# Patient Record
Sex: FEMALE | Race: WHITE | NOT HISPANIC OR LATINO | Employment: FULL TIME | URBAN - METROPOLITAN AREA
[De-identification: names, ages, dates, MRNs, and addresses within clinical notes are randomized per-mention and may not be internally consistent; named-entity substitution may affect disease eponyms.]

---

## 2017-01-07 ENCOUNTER — OFFICE VISIT (OUTPATIENT)
Dept: URGENT CARE | Facility: URGENT CARE | Age: 57
End: 2017-01-07
Payer: COMMERCIAL

## 2017-01-07 VITALS
WEIGHT: 167.6 LBS | TEMPERATURE: 101.6 F | SYSTOLIC BLOOD PRESSURE: 123 MMHG | BODY MASS INDEX: 24.74 KG/M2 | HEART RATE: 99 BPM | OXYGEN SATURATION: 96 % | DIASTOLIC BLOOD PRESSURE: 77 MMHG

## 2017-01-07 DIAGNOSIS — R07.0 THROAT PAIN: ICD-10-CM

## 2017-01-07 DIAGNOSIS — R68.89 FLU-LIKE SYMPTOMS: Primary | ICD-10-CM

## 2017-01-07 LAB
DEPRECATED S PYO AG THROAT QL EIA: NORMAL
MICRO REPORT STATUS: NORMAL
SPECIMEN SOURCE: NORMAL

## 2017-01-07 PROCEDURE — 87081 CULTURE SCREEN ONLY: CPT | Performed by: FAMILY MEDICINE

## 2017-01-07 PROCEDURE — 87880 STREP A ASSAY W/OPTIC: CPT | Performed by: FAMILY MEDICINE

## 2017-01-07 PROCEDURE — 99213 OFFICE O/P EST LOW 20 MIN: CPT | Performed by: FAMILY MEDICINE

## 2017-01-07 NOTE — NURSING NOTE
"Chief Complaint   Patient presents with     URI     started Monday with a cold       Initial /77 mmHg  Pulse 99  Temp(Src) 101.6  F (38.7  C) (Oral)  Wt 167 lb 9.6 oz (76.023 kg)  SpO2 96% Estimated body mass index is 24.74 kg/(m^2) as calculated from the following:    Height as of 11/4/16: 5' 9\" (1.753 m).    Weight as of this encounter: 167 lb 9.6 oz (76.023 kg).  BP completed using cuff size: stan Jorge MA        "

## 2017-01-07 NOTE — PROGRESS NOTES
Madison Garcia with presents with 4-5 days symptoms including sore throat, congestion, non productive cough, high fever.  symptoms seem to worsen over the past da with the fever and the st worsening.     The patient has a history of mitral valve replacement.     Treatment measures tried include OTC meds.    Exposure to  and son with more mild cold illnesses  No  recent travel     Current Outpatient Prescriptions   Medication Sig Dispense Refill     buPROPion (WELLBUTRIN XL) 150 MG 24 hr tablet Take 3 tablets (450 mg) by mouth daily 270 tablet 1     simvastatin (ZOCOR) 40 MG tablet Take 1 tablet (40 mg) by mouth At Bedtime 90 tablet 3     melatonin 3 MG CAPS Take 6 mg by mouth daily       estradiol (VAGIFEM) 10 MCG TABS Place 1 tablet (10 mcg) vaginally twice a week 8 tablet      ALPRAZolam (XANAX) 1 MG tablet Take 1 tablet (1 mg) by mouth nightly as needed for anxiety 90 tablet 0     Ibuprofen-Diphenhydramine Cit (IBUPROFEN PM PO)        diphenhydrAMINE (BENADRYL) 25 MG capsule Take 25 mg by mouth Take 2 tablets at night prn for sleep       ibuprofen (ADVIL) 200 MG tablet Take 400 mg by mouth every 4 hours as needed       acetaminophen (TYLENOL) 325 MG tablet Take 325-650 mg by mouth every 6 hours as needed.       aspirin 81 MG tablet Take 1 tablet by mouth daily. 1 tablet 3     Multiple Vitamin (DAILY MULTIVITAMIN PO) Take 1 tablet by mouth daily.         ROS otherwise negative for resp., ID,  HEENT symptoms.    Objective: /77 mmHg  Pulse 99  Temp(Src) 101.6  F (38.7  C) (Oral)  Wt 167 lb 9.6 oz (76.023 kg)  SpO2 96%  Exam:  GENERAL APPEARANCE: healthy, alert and no distress  EYES: Eyes grossly normal to inspection  HENT: ear canals and TM's normal and nose and mouth without ulcers or lesions  NECK: no adenopathy, no asymmetry, masses, or scars and thyroid normal to palpation  RESP: lungs clear to auscultation - no rales, rhonchi or wheezes  CV: regular rates and rhythm, normal S1 S2, no S3 or S4  and no murmur, click or rub -      Rapid strep test is negative.       ICD-10-CM    1. Flu-like symptoms R68.89    2. Throat pain R07.0 Strep, Rapid Screen     Beta strep group A culture     Symptomatic therapy and follow up discussed

## 2017-01-07 NOTE — Clinical Note
[address]  [address]  [phone]        January 7, 2017      RE: Madison Garcia  [address] [address] [place] [id number]       To whom it may concern:    Madison Garcia was seen in our clinic today. She may return to work on or about January 10, 2017 .      Sincerely,      Miguel Whaley MD

## 2017-01-09 LAB
BACTERIA SPEC CULT: NORMAL
MICRO REPORT STATUS: NORMAL
SPECIMEN SOURCE: NORMAL

## 2017-04-03 ENCOUNTER — TRANSFERRED RECORDS (OUTPATIENT)
Dept: HEALTH INFORMATION MANAGEMENT | Facility: CLINIC | Age: 57
End: 2017-04-03

## 2017-05-17 DIAGNOSIS — F41.9 ANXIETY: ICD-10-CM

## 2017-05-18 RX ORDER — ALPRAZOLAM 1 MG
TABLET ORAL
Qty: 90 TABLET | Refills: 0 | Status: SHIPPED | OUTPATIENT
Start: 2017-05-18 | End: 2017-08-16

## 2017-05-18 NOTE — TELEPHONE ENCOUNTER
ALPRAZolam (XANAX) 1 MG tablet        Last Written Prescription Date: 11/4/2016  Last Fill Quantity: 90,  # refills: 0   Last Office Visit with [facility], [facility] or [facility] prescribing provider: 11/4/2016

## 2017-05-22 DIAGNOSIS — F41.9 ANXIETY: ICD-10-CM

## 2017-05-22 RX ORDER — ALPRAZOLAM 1 MG
TABLET ORAL
Qty: 90 TABLET | OUTPATIENT
Start: 2017-05-22

## 2017-08-16 DIAGNOSIS — F32.A ANXIETY AND DEPRESSION: ICD-10-CM

## 2017-08-16 DIAGNOSIS — F41.9 ANXIETY AND DEPRESSION: ICD-10-CM

## 2017-08-16 RX ORDER — ALPRAZOLAM 1 MG
TABLET ORAL
Qty: 90 TABLET | Refills: 0 | Status: SHIPPED | OUTPATIENT
Start: 2017-08-16 | End: 2017-11-06

## 2017-08-16 RX ORDER — BUPROPION HYDROCHLORIDE 150 MG/1
TABLET ORAL
Qty: 270 TABLET | Refills: 0 | Status: SHIPPED | OUTPATIENT
Start: 2017-08-16 | End: 2017-11-06

## 2017-08-16 RX ORDER — BUPROPION HYDROCHLORIDE 300 MG/1
TABLET ORAL
Qty: 90 TABLET | Refills: 0 | Status: SHIPPED | OUTPATIENT
Start: 2017-08-16 | End: 2017-11-06

## 2017-10-18 ENCOUNTER — HOSPITAL ENCOUNTER (OUTPATIENT)
Dept: CARDIOLOGY | Facility: CLINIC | Age: 57
Discharge: HOME | End: 2017-10-18
Attending: INTERNAL MEDICINE | Admitting: INTERNAL MEDICINE
Payer: COMMERCIAL

## 2017-10-18 DIAGNOSIS — I05.9 MITRAL VALVE DISEASE: ICD-10-CM

## 2017-10-18 PROCEDURE — 93306 TTE W/DOPPLER COMPLETE: CPT

## 2017-10-18 PROCEDURE — 93306 TTE W/DOPPLER COMPLETE: CPT | Mod: 26 | Performed by: INTERNAL MEDICINE

## 2017-10-24 ENCOUNTER — OFFICE VISIT (OUTPATIENT)
Dept: CARDIOLOGY | Facility: CLINIC | Age: 57
End: 2017-10-24
Attending: INTERNAL MEDICINE
Payer: COMMERCIAL

## 2017-10-24 VITALS
HEART RATE: 76 BPM | DIASTOLIC BLOOD PRESSURE: 70 MMHG | HEIGHT: 69 IN | BODY MASS INDEX: 25.92 KG/M2 | WEIGHT: 175 LBS | SYSTOLIC BLOOD PRESSURE: 117 MMHG

## 2017-10-24 DIAGNOSIS — I25.83 CORONARY ARTERY DISEASE DUE TO LIPID RICH PLAQUE: Primary | ICD-10-CM

## 2017-10-24 DIAGNOSIS — I05.9 MITRAL VALVE DISEASE: ICD-10-CM

## 2017-10-24 DIAGNOSIS — R06.09 DOE (DYSPNEA ON EXERTION): ICD-10-CM

## 2017-10-24 DIAGNOSIS — I25.10 CORONARY ARTERY DISEASE DUE TO LIPID RICH PLAQUE: Primary | ICD-10-CM

## 2017-10-24 PROCEDURE — 99214 OFFICE O/P EST MOD 30 MIN: CPT | Performed by: INTERNAL MEDICINE

## 2017-10-24 RX ORDER — ROSUVASTATIN CALCIUM 20 MG/1
20 TABLET, COATED ORAL DAILY
Qty: 30 TABLET | Refills: 11 | Status: SHIPPED | OUTPATIENT
Start: 2017-10-24 | End: 2018-01-23

## 2017-10-24 RX ORDER — ESTRADIOL 0.1 MG/G
2 CREAM VAGINAL
COMMUNITY
End: 2018-11-08

## 2017-10-24 RX ORDER — CARVEDILOL 3.12 MG/1
3.12 TABLET ORAL SEE ADMIN INSTRUCTIONS
Qty: 60 TABLET | Refills: 11 | Status: SHIPPED | OUTPATIENT
Start: 2017-10-24 | End: 2018-11-08

## 2017-10-24 NOTE — PROGRESS NOTES
Cardiology Progress Note          Assessment and Plan:     1. Mitral valve repair with repair-induced mitral stenosis, mild-moderate    Trial of carvedilol 3.125 mg daily to help with her exertional dyspnea.  May go up to 6.25 mg or 9.375 mg.    Echocardiogram appears stable.      2. Mild coronary artery disease seen on angiogram 2013    Suboptimal LDL control    Change simvastatin 40 mg to rosuvastatin 20 mg and let us know if she gets myalgias.    She may refill her medications with her primary physician.  Follow-up in around 3-5 years with echocardiogram, sooner if symptoms arise.    This note was transcribed using electronic voice recognition software and there may be typographical errors present.                Interval History:   The patient is a very pleasant 57 year old whom I have been following since minimally invasive mitral valve repair at [facility] 2013.  She has some repair-induced mitral stenosis that has been relatively stable with mean gradient 6-7 mmHg.  Her gradient goes up higher than 10 post exercise.  She feels that her exercise tolerance is diminished but stable.  She did have some mild coronary artery disease on her angiogram at the time of her surgery.  She is on simvastatin 40 mg with .  She had myalgias on atorvastatin.  She has never tried rosuvastatin.  She was previously on atenolol and metoprolol to try to plug her exercise-induced heart rate and help her dyspnea.  She had some lightheadedness with those medications and possible mood effects of the metoprolol.                       Review of Systems:   Review of Systems:  Skin:  Negative     Eyes:  Positive for glasses  ENT:  Negative    Respiratory:  Positive for dyspnea on exertion  Cardiovascular:    Positive for;dizziness  Gastroenterology: Negative    Genitourinary:  not assessed    Musculoskeletal:  Negative    Neurologic:  Negative    Psychiatric:  Positive for depression;anxiety  Heme/Lymph/Imm:  Negative    Endocrine:  " Negative                Physical Exam:     Vitals: /70  Pulse 76  Ht 1.753 m (5' 9\")  Wt 79.4 kg (175 lb)  BMI 25.84 kg/m2  Constitutional:  cooperative, alert and oriented, well developed, well nourished, in no acute distress        Skin:  warm and dry to the touch, no apparent skin lesions or masses noted surgical scars well-healed      Head:  normocephalic, no masses or lesions        Eyes:       eyeglasses    ENT:  no pallor or cyanosis, dentition good        Neck:  JVP normal        Chest:  normal breath sounds, clear to auscultation, normal A-P diameter, normal symmetry, normal respiratory excursion, no use of accessory muscles        Cardiac: regular rhythm;normal S1 and S2       systolic murmur;grade 1          Abdomen:      benign    Extremities and Back:  no deformities, clubbing, cyanosis, erythema observed        Neurological:  affect appropriate, oriented to time, person and place;no gross motor deficits                 Medications:     Current Outpatient Prescriptions   Medication Sig Dispense Refill     estradiol (ESTRACE) 0.1 MG/GM cream Place 2 g vaginally twice a week       rosuvastatin (CRESTOR) 20 MG tablet Take 1 tablet (20 mg) by mouth daily 30 tablet 11     carvedilol (COREG) 3.125 MG tablet Take 1 tablet (3.125 mg) by mouth See Admin Instructions 60 tablet 11     buPROPion (WELLBUTRIN XL) 300 MG 24 hr tablet TAKE 1 TABLET BY MOUTH EVERY DAY ALONG WITH THE 150MG TABLET 90 tablet 0     ALPRAZolam (XANAX) 1 MG tablet TAKE 1 TAB AT NIGHT AS NEEDED FOR ANXIETY 90 tablet 0     buPROPion (WELLBUTRIN XL) 150 MG 24 hr tablet Take 1 of the 150mg along with one of the 300mg tablets for total daily dose of 450mg 270 tablet 0     melatonin 3 MG CAPS Take 6 mg by mouth daily       Ibuprofen-Diphenhydramine Cit (IBUPROFEN PM PO)        diphenhydrAMINE (BENADRYL) 25 MG capsule Take 25 mg by mouth Take 2 tablets at night prn for sleep       ibuprofen (ADVIL) 200 MG tablet Take 400 mg by mouth every " 4 hours as needed       acetaminophen (TYLENOL) 325 MG tablet Take 325-650 mg by mouth every 6 hours as needed.       aspirin 81 MG tablet Take 1 tablet by mouth daily. 1 tablet 3     Multiple Vitamin (DAILY MULTIVITAMIN PO) Take 1 tablet by mouth daily.                  Data:   All laboratory data reviewed  No results found for this or any previous visit (from the past 24 hour(s)).    All laboratory data reviewed  Lab Results   Component Value Date    CHOL 193 11/04/2016     Lab Results   Component Value Date    HDL 57 11/04/2016     Lab Results   Component Value Date     11/04/2016     Lab Results   Component Value Date    TRIG 123 11/04/2016     Lab Results   Component Value Date    CHOLHDLRATIO 3.4 11/02/2015     TSH   Date Value Ref Range Status   10/22/2013 4.89 0.4 - 5.0 mU/L Final     Last Basic Metabolic Panel:  Lab Results   Component Value Date     11/04/2016      Lab Results   Component Value Date    POTASSIUM 4.0 11/04/2016     Lab Results   Component Value Date    CHLORIDE 104 11/04/2016     Lab Results   Component Value Date    JANIS 9.4 11/04/2016     Lab Results   Component Value Date    CO2 30 11/04/2016     Lab Results   Component Value Date    BUN 14 11/04/2016     Lab Results   Component Value Date    CR 0.88 11/04/2016     Lab Results   Component Value Date    GLC 88 11/04/2016     Lab Results   Component Value Date    WBC 4.2 11/04/2016     Lab Results   Component Value Date    RBC 4.56 11/04/2016     Lab Results   Component Value Date    HGB 13.4 11/04/2016     Lab Results   Component Value Date    HCT 40.3 11/04/2016     Lab Results   Component Value Date    MCV 88 11/04/2016     Lab Results   Component Value Date    MCH 29.4 11/04/2016     Lab Results   Component Value Date    MCHC 33.3 11/04/2016     Lab Results   Component Value Date    RDW 12.4 11/04/2016     Lab Results   Component Value Date     11/04/2016

## 2017-10-24 NOTE — MR AVS SNAPSHOT
After Visit Summary   10/24/2017    Madison Garcia    MRN: [id number]           Patient Information     Date Of Birth          1960        Visit Information        Provider Department      10/24/2017 8:45 AM Casey Roberts MD [facility]        Today's Diagnoses     Coronary artery disease due to lipid rich plaque    -  1    Mitral valve disease        [place] (dyspnea on exertion)          Care Instructions    Please try CRESTOR 20 mg daily, that will be more potent than your SIMVASTATIN 40. If you get muscle weakness or aches, you may cut it in half or stop it. Please let us know if you do.    If you notice worsening shortness of breath, especially if a distinct and abrupt change, please let us know ASAP.    Try the CARVEDILOL 3.125mg once per day in the morning or mid-day. It lasts about 12 hours. If you would like, you may increase it to 6.25mg (two pills) or 9.375mg (three pills) depending on how you feel on it. It'll be a trade off of breathing and lightheadedness.    See you in ~5 years with an echo.             Follow-ups after your visit        Your next 10 appointments already scheduled     Nov 06, 2017  8:00 AM CST   [facility]YS[facility] with [facility]ith [facility]   [facility]    [address] [address] [phone]              Who to contact     If you have questions or need follow up information about today's clinic visit or your schedule please contact [facility] directly at [phone].  Normal or non-critical lab and imaging results will be communicated to you by MyChart, letter or phone within 4 business days after the clinic has received the results. If you do not hear from us within 7 days, please contact the clinic through MyChart or phone. If you have a critical or abnormal lab result, we will notify you by phone as soon as possible.  Submit  "refill requests through [employer] or call your pharmacy and they will forward the refill request to us. Please allow 3 business days for your refill to be completed.          Additional Information About Your Visit        [employer] Information     [employer] gives you secure access to your electronic health record. If you see a primary care provider, you can also send messages to your care team and make appointments. If you have questions, please call your primary care clinic.  If you do not have a primary care provider, please call [phone] and they will assist you.        Care EveryWhere ID     This is your Care EveryWhere ID. This could be used by other organizations to access your [place] medical records  [id number]        Your Vitals Were     Pulse Height BMI (Body Mass Index)             76 1.753 m (5' 9\") 25.84 kg/m2          Blood Pressure from Last 3 Encounters:   10/24/17 117/70   01/07/17 123/77   11/04/16 100/70    Weight from Last 3 Encounters:   10/24/17 79.4 kg (175 lb)   01/07/17 76 kg (167 lb 9.6 oz)   11/04/16 76.5 kg (168 lb 11.2 oz)              We Performed the Following     Follow-Up with Cardiologist          Today's Medication Changes          These changes are accurate as of: 10/24/17  9:00 AM.  If you have any questions, ask your nurse or doctor.               Start taking these medicines.        Dose/Directions    carvedilol 3.125 MG tablet   Commonly known as:  COREG   Used for:  [place] (dyspnea on exertion)   Started by:  Casey Roberts MD        Dose:  3.125 mg   Take 1 tablet (3.125 mg) by mouth See Admin Instructions   Quantity:  60 tablet   Refills:  11       rosuvastatin 20 MG tablet   Commonly known as:  CRESTOR   Used for:  Coronary artery disease due to lipid rich plaque   Started by:  Casey Roberts MD        Dose:  20 mg   Take 1 tablet (20 mg) by mouth daily   Quantity:  30 tablet   Refills:  11         Stop taking these medicines if you haven't already. Please " contact your care team if you have questions.     simvastatin 40 MG tablet   Commonly known as:  ZOCOR   Stopped by:  Casey Roberts MD                Where to get your medicines      These medications were sent to [facility]/pharmacy #[id number] - [facility] [address] [place] AT [facility]  [address] [facility] [address]     Phone:  [phone]     carvedilol 3.125 MG tablet    rosuvastatin 20 MG tablet                Primary Care Provider Office Phone # Fax #    Tera Keith [phone] [address] [facility] [address]  [place] [address]        Equal Access to Services     [facility] : Hadii aleah chaves hadasho Soriana, waaxda luqadaha, qaybta kaalmada adetanneryada, lidia wu . So [facility] [phone].    ATENCIÓN: Si habla español, tiene a westfall disposición servicios gratuitos de asistencia lingüística. Llame al [phone].    We comply with applicable federal civil rights laws and [place] laws. We do not discriminate on the basis of race, color, national origin, age, disability, sex, sexual orientation, or gender identity.            Thank you!     Thank you for choosing [facility] [place]  for your care. Our goal is always to provide you with excellent care. Hearing back from our patients is one way we can continue to improve our services. Please take a few minutes to complete the written survey that you may receive in the mail after your visit with us. Thank you!             Your Updated Medication List - Protect others around you: Learn how to safely use, store and throw away your medicines at www.disposemymeds.org.          This list is accurate as of: 10/24/17  9:00 AM.  Always use your most recent med list.                   Brand Name Dispense Instructions for use Diagnosis    ADVIL 200 MG tablet   Generic drug:  ibuprofen      Take 400 mg by mouth every 4 hours as needed         ALPRAZolam 1 MG tablet    XANAX    90 tablet    TAKE 1 TAB AT NIGHT AS NEEDED FOR ANXIETY    Anxiety and depression       aspirin 81 MG tablet     1 tablet    Take 1 tablet by mouth daily.    ASCVD (arteriosclerotic cardiovascular disease)       BENADRYL 25 MG capsule   Generic drug:  diphenhydrAMINE      Take 25 mg by mouth Take 2 tablets at night prn for sleep        * buPROPion 300 MG 24 hr tablet    WELLBUTRIN XL    90 tablet    TAKE 1 TABLET BY MOUTH EVERY DAY ALONG WITH THE 150MG TABLET    Anxiety and depression       * buPROPion 150 MG 24 hr tablet    WELLBUTRIN XL    270 tablet    Take 1 of the 150mg along with one of the 300mg tablets for total daily dose of 450mg    Anxiety and depression       carvedilol 3.125 MG tablet    COREG    60 tablet    Take 1 tablet (3.125 mg) by mouth See Admin Instructions    [place] (dyspnea on exertion)       DAILY MULTIVITAMIN PO      Take 1 tablet by mouth daily.    Routine general medical examination at a health care facility, Anxiety and depression, MVP (mitral valve prolapse)       estradiol 0.1 MG/GM cream    ESTRACE     Place 2 g vaginally twice a week        IBUPROFEN PM PO           melatonin 3 MG Caps      Take 6 mg by mouth daily        rosuvastatin 20 MG tablet    CRESTOR    30 tablet    Take 1 tablet (20 mg) by mouth daily    Coronary artery disease due to lipid rich plaque       TYLENOL 325 MG tablet   Generic drug:  acetaminophen      Take 325-650 mg by mouth every 6 hours as needed.        * Notice:  This list has 2 medication(s) that are the same as other medications prescribed for you. Read the directions carefully, and ask your doctor or other care provider to review them with you.

## 2017-10-24 NOTE — PATIENT INSTRUCTIONS
Please try CRESTOR 20 mg daily, that will be more potent than your SIMVASTATIN 40. If you get muscle weakness or aches, you may cut it in half or stop it. Please let us know if you do.    If you notice worsening shortness of breath, especially if a distinct and abrupt change, please let us know ASAP.    Try the CARVEDILOL 3.125mg once per day in the morning or mid-day. It lasts about 12 hours. If you would like, you may increase it to 6.25mg (two pills) or 9.375mg (three pills) depending on how you feel on it. It'll be a trade off of breathing and lightheadedness.    See you in ~5 years with an echo.

## 2017-11-03 NOTE — PROGRESS NOTES
SUBJECTIVE:   CC: Madison Garcia is an 57 year old woman who presents for preventive health visit.     She has chronic insomnia for years, using xanax hs, no signs abuse, and otc, with that still some issues.  Did get seen at sleep clinic as noted in past.      She has dyspnea on exertion with stairs or hills for years, since mv repair, not new or changed, prior eval by both pulm and cards and now to try coreg via cards but valve stable    Has had prior lung nodules but follow up scans no change.    Her depression and anxiety are under fairly good control.   Works out some.  Working full time, 2 kids 9 and 14.    Physical   Annual:     Getting at least 3 servings of Calcium per day::  NO    Bi-annual eye exam::  Yes    Dental care twice a year::  Yes    Sleep apnea or symptoms of sleep apnea::  None    Diet::  Regular (no restrictions)    Frequency of exercise::  1 day/week    Taking medications regularly::  Yes    Medication side effects::  None    Additional concerns today::  No            Today's PHQ-2 Score:   PHQ-2 ( 1999 [employer]) 11/3/2017   Q1: Little interest or pleasure in doing things 1   Q2: Feeling down, depressed or hopeless 1   PHQ-2 Score 2   Q1: Little interest or pleasure in doing things Several days   Q2: Feeling down, depressed or hopeless Several days   PHQ-2 Score 2       Abuse: Current or Past(Physical, Sexual or Emotional)- No  Do you feel safe in your environment - Yes    Social History   Substance Use Topics     Smoking status: Never Smoker     Smokeless tobacco: Never Used     Alcohol use 0.0 oz/week     0 Standard drinks or equivalent per week      Comment: 1-2/week      The patient does not drink >3 drinks per day nor >7 drinks per week.                Past Medical History:      Past Medical History:   Diagnosis Date     Abdominal pain 2008 and 2010    ct neg 2008, ovar us 2010 with fallopian cyst and fibroids     Anxiety and depression 90's     ASCVD (arteriosclerotic cardiovascular  disease) 12/12    pos est, ct angio and cards fu, 25-49% lad     Chronic cystitis     Dr. Mcgrath     Chronic insomnia     for years, seen in sleep clinic 2016     Colonic polyp fu nl 2008    fu due 2013, fu done 2014 and nl, to fu 2019     [place] (dyspnea on exertion)     est echo nl 2015, ct chest and pulm eval by Dr. Quiroga and no cause found     Hyperlipidaemia      Leg pain 2014    stopped lipitor     Lung nodules 01/2012    seen on coronary calcium ct, fu 1 year, fu done 12/12 and likely benign, fu done 2016 and no change, Dr. Quiroga     MVP (mitral valve prolapse) 2013     Had it surgically repaired, Robotic, done [facility] 6/13 seen by cardiology 2011, echo 2007 with mild mr and nl lv fxn, fu echo 12/11 with lv size upper limits of nl, nl ef, no wma, mild lae, mvp and mild mr present     Palpitations      Screening Jan 2012    cor ct score 0             Past Surgical History:      Past Surgical History:   Procedure Laterality Date     HERNIA REPAIR       REPAIR VALVE MITRAL  6/13    done at [facility], robotic             Social History:     Social History     Social History     Marital status:      Spouse name: N/A     Number of children: 2     Years of education: N/A     Occupational History      [employer]     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      Social History Main Topics     Smoking status: Never Smoker     Smokeless tobacco: Never Used     Alcohol use 0.0 oz/week     0 Standard drinks or equivalent per week      Comment: 1-2/week      Drug use: No     Sexual activity: Yes     Partners: Male     Birth control/ protection: Condom     Other Topics Concern     Caffeine Concern Yes     2 cans of pop per day     Special Diet Yes     vegetarian     Exercise Yes     3x per week      Social History Narrative             Family History:   reviewed         Allergies:     Allergies   Allergen Reactions     Albumin (Human) Difficulty breathing     Atorvastatin Muscle Pain (Myalgia)     Erythromycin       "GI upset, intolerance             Medications:     Current Outpatient Prescriptions   Medication Sig Dispense Refill     melatonin 5 MG CAPS Take 5 mg by mouth At Bedtime       buPROPion (WELLBUTRIN XL) 150 MG 24 hr tablet Take 1 of the 150mg along with one of the 300mg tablets for total daily dose of 450mg 90 tablet 3     buPROPion (WELLBUTRIN XL) 300 MG 24 hr tablet TAKE 1 TABLET BY MOUTH EVERY DAY ALONG WITH THE 150MG TABLET 90 tablet 3     ALPRAZolam (XANAX) 1 MG tablet TAKE 1 TAB AT NIGHT AS NEEDED FOR ANXIETY 90 tablet 0     estradiol (ESTRACE) 0.1 MG/GM cream Place 2 g vaginally twice a week       Ibuprofen-Diphenhydramine Cit (IBUPROFEN PM PO)        diphenhydrAMINE (BENADRYL) 25 MG capsule Take 25 mg by mouth Take 2 tablets at night prn for sleep       ibuprofen (ADVIL) 200 MG tablet Take 400 mg by mouth every 4 hours as needed       acetaminophen (TYLENOL) 325 MG tablet Take 325-650 mg by mouth every 6 hours as needed.       aspirin 81 MG tablet Take 1 tablet by mouth daily. 1 tablet 3     Multiple Vitamin (DAILY MULTIVITAMIN PO) Take 1 tablet by mouth daily.       rosuvastatin (CRESTOR) 20 MG tablet Take 1 tablet (20 mg) by mouth daily 30 tablet 11     carvedilol (COREG) 3.125 MG tablet Take 1 tablet (3.125 mg) by mouth See Admin Instructions 60 tablet 11     [DISCONTINUED] buPROPion (WELLBUTRIN XL) 300 MG 24 hr tablet TAKE 1 TABLET BY MOUTH EVERY DAY ALONG WITH THE 150MG TABLET 90 tablet 0     [DISCONTINUED] buPROPion (WELLBUTRIN XL) 150 MG 24 hr tablet Take 1 of the 150mg along with one of the 300mg tablets for total daily dose of 450mg 270 tablet 0               Review of Systems:   The 10 point Review of Systems is negative other than noted in the HPI           Physical Exam:   Blood pressure 102/66, pulse 74, temperature 97.6  F (36.4  C), temperature source Oral, height 5' 9\" (1.753 m), weight 174 lb 11.2 oz (79.2 kg), SpO2 100 %, not currently breastfeeding.    Exam:  Constitutional: healthy " appearing, alert and in no distress  Heent: Normocephalic. Head without obvious masses or lesions. PERRLDC, EOMI. Mouth exam within normal limits: tongue, mucous membranes, posterior pharynx all normal, no lesions or abnormalities seen.  Tm's and canals within normal limits bilaterally. Neck supple, no nuchal rigidity or masses. No supraclavicular, or cervical adenopathy. Thyroid symmetric, no masses.  Cardiovascular: Regular rate and rhythm, no murmer, rub or gallops.  JVP not elevated, no edema.  Carotids within normal limits bilaterally, no bruits.  Respiratory: Normal respiratory effort.  Lungs clear, normal flow, no wheezing or crackles.  Gastrointestinal: Normal active bowel sounds.   Soft, not tender, no masses, guarding or rebound.  No hepatosplenomegaly.   Musculoskeletal: extremities normal, no gross deformities noted.  Skin: no suspicious lesions or rashes   Neurologic: Mental status within normal limits.  Speech fluent.  No gross motor abnormalities and gait intact.  Psychiatric: mentation appears normal and affect normal.         Data:   Labs sent        Assessment:   1. Normal complete physical exam  2. Ascvd, minimal, med mgmt, per cards to change from zocor to crestor, has not yet  3. mv repair, mitral stenosis, mild, follow up cards  4. Dyspnea on exertion, suspect in part decond, doubt ascvd, pulmonary embolis, etc  5. Lung nodules, she will check with pulm but I doubt follow up needed  6. Chronic insomnia, no signs med abuse  7. Depression,s table  8. Elevated cholesterol, as above  9. Colon polyp, up to date on follow up  10. hcm         Plan:   Up to date immunizations, mammogram, pap and colon  Exercise, diet and weight loss  Letter with labs  Call if changes      Tera Keith M.D.                                      Review of Systems       OBJECTIVE:   There were no vitals taken for this visit.  Physical Exam      ASSESSMENT/PLAN:       COUNSELING:

## 2017-11-06 ENCOUNTER — OFFICE VISIT (OUTPATIENT)
Dept: FAMILY MEDICINE | Facility: CLINIC | Age: 57
End: 2017-11-06
Payer: COMMERCIAL

## 2017-11-06 VITALS
BODY MASS INDEX: 25.87 KG/M2 | HEIGHT: 69 IN | TEMPERATURE: 97.6 F | WEIGHT: 174.7 LBS | HEART RATE: 74 BPM | OXYGEN SATURATION: 100 % | DIASTOLIC BLOOD PRESSURE: 66 MMHG | SYSTOLIC BLOOD PRESSURE: 102 MMHG

## 2017-11-06 DIAGNOSIS — F41.9 ANXIETY: ICD-10-CM

## 2017-11-06 DIAGNOSIS — K63.5 POLYP OF COLON, UNSPECIFIED PART OF COLON, UNSPECIFIED TYPE: ICD-10-CM

## 2017-11-06 DIAGNOSIS — G47.00 INSOMNIA, UNSPECIFIED TYPE: Chronic | ICD-10-CM

## 2017-11-06 DIAGNOSIS — I25.10 ASCVD (ARTERIOSCLEROTIC CARDIOVASCULAR DISEASE): Chronic | ICD-10-CM

## 2017-11-06 DIAGNOSIS — F33.41 RECURRENT MAJOR DEPRESSIVE DISORDER, IN PARTIAL REMISSION (H): Chronic | ICD-10-CM

## 2017-11-06 DIAGNOSIS — R06.09 DOE (DYSPNEA ON EXERTION): ICD-10-CM

## 2017-11-06 DIAGNOSIS — Z00.00 ROUTINE GENERAL MEDICAL EXAMINATION AT A HEALTH CARE FACILITY: Primary | ICD-10-CM

## 2017-11-06 DIAGNOSIS — E78.5 HYPERLIPIDEMIA LDL GOAL <100: Chronic | ICD-10-CM

## 2017-11-06 PROBLEM — R79.89 ELEVATED TSH: Status: ACTIVE | Noted: 2017-11-01

## 2017-11-06 LAB
ALBUMIN SERPL-MCNC: 3.5 G/DL (ref 3.4–5)
ALP SERPL-CCNC: 59 U/L (ref 40–150)
ALT SERPL W P-5'-P-CCNC: 24 U/L (ref 0–50)
ANION GAP SERPL CALCULATED.3IONS-SCNC: 6 MMOL/L (ref 3–14)
AST SERPL W P-5'-P-CCNC: 23 U/L (ref 0–45)
BILIRUB SERPL-MCNC: 0.4 MG/DL (ref 0.2–1.3)
BUN SERPL-MCNC: 14 MG/DL (ref 7–30)
CALCIUM SERPL-MCNC: 9 MG/DL (ref 8.5–10.1)
CHLORIDE SERPL-SCNC: 105 MMOL/L (ref 94–109)
CHOLEST SERPL-MCNC: 162 MG/DL
CO2 SERPL-SCNC: 28 MMOL/L (ref 20–32)
CREAT SERPL-MCNC: 0.83 MG/DL (ref 0.52–1.04)
ERYTHROCYTE [DISTWIDTH] IN BLOOD BY AUTOMATED COUNT: 12.2 % (ref 10–15)
GFR SERPL CREATININE-BSD FRML MDRD: 71 ML/MIN/1.7M2
GLUCOSE SERPL-MCNC: 80 MG/DL (ref 70–99)
HCT VFR BLD AUTO: 37.2 % (ref 35–47)
HDLC SERPL-MCNC: 56 MG/DL
HGB BLD-MCNC: 12.2 G/DL (ref 11.7–15.7)
LDLC SERPL CALC-MCNC: 81 MG/DL
MCH RBC QN AUTO: 28.8 PG (ref 26.5–33)
MCHC RBC AUTO-ENTMCNC: 32.8 G/DL (ref 31.5–36.5)
MCV RBC AUTO: 88 FL (ref 78–100)
NONHDLC SERPL-MCNC: 106 MG/DL
PLATELET # BLD AUTO: 284 10E9/L (ref 150–450)
POTASSIUM SERPL-SCNC: 4.2 MMOL/L (ref 3.4–5.3)
PROT SERPL-MCNC: 7.1 G/DL (ref 6.8–8.8)
RBC # BLD AUTO: 4.23 10E12/L (ref 3.8–5.2)
SODIUM SERPL-SCNC: 139 MMOL/L (ref 133–144)
T4 FREE SERPL-MCNC: 0.94 NG/DL (ref 0.76–1.46)
TRIGL SERPL-MCNC: 127 MG/DL
TSH SERPL DL<=0.005 MIU/L-ACNC: 4.74 MU/L (ref 0.4–4)
WBC # BLD AUTO: 4.2 10E9/L (ref 4–11)

## 2017-11-06 PROCEDURE — 84443 ASSAY THYROID STIM HORMONE: CPT | Performed by: INTERNAL MEDICINE

## 2017-11-06 PROCEDURE — 80061 LIPID PANEL: CPT | Performed by: INTERNAL MEDICINE

## 2017-11-06 PROCEDURE — 36415 COLL VENOUS BLD VENIPUNCTURE: CPT | Performed by: INTERNAL MEDICINE

## 2017-11-06 PROCEDURE — 85027 COMPLETE CBC AUTOMATED: CPT | Performed by: INTERNAL MEDICINE

## 2017-11-06 PROCEDURE — 80053 COMPREHEN METABOLIC PANEL: CPT | Performed by: INTERNAL MEDICINE

## 2017-11-06 PROCEDURE — 84439 ASSAY OF FREE THYROXINE: CPT | Performed by: INTERNAL MEDICINE

## 2017-11-06 PROCEDURE — 99396 PREV VISIT EST AGE 40-64: CPT | Performed by: INTERNAL MEDICINE

## 2017-11-06 RX ORDER — ALPRAZOLAM 1 MG
TABLET ORAL
Qty: 90 TABLET | Refills: 0 | Status: SHIPPED | OUTPATIENT
Start: 2017-11-06 | End: 2018-05-30

## 2017-11-06 RX ORDER — BUPROPION HYDROCHLORIDE 300 MG/1
TABLET ORAL
Qty: 90 TABLET | Refills: 3 | Status: SHIPPED | OUTPATIENT
Start: 2017-11-06 | End: 2018-11-08

## 2017-11-06 RX ORDER — BUPROPION HYDROCHLORIDE 150 MG/1
TABLET ORAL
Qty: 90 TABLET | Refills: 3 | Status: SHIPPED | OUTPATIENT
Start: 2017-11-06 | End: 2018-11-08

## 2017-11-06 ASSESSMENT — PATIENT HEALTH QUESTIONNAIRE - PHQ9: SUM OF ALL RESPONSES TO PHQ QUESTIONS 1-9: 4

## 2017-11-06 NOTE — MR AVS SNAPSHOT
After Visit Summary   11/6/2017    Madison Garcia    MRN: [id number]           Patient Information     Date Of Birth          1960        Visit Information        Provider Department      11/6/2017 8:00 AM Tera Keith MD [facility]        Today's Diagnoses     Routine general medical examination at a health care facility    -  1    Recurrent major depressive disorder, in partial remission (H)        ASCVD (arteriosclerotic cardiovascular disease)        Hyperlipidemia LDL goal <100        [place] (dyspnea on exertion)        Anxiety        Polyp of colon, unspecified part of colon, unspecified type        Insomnia, unspecified type           Follow-ups after your visit        Who to contact     If you have questions or need follow up information about today's clinic visit or your schedule please contact [facility] directly at [phone].  Normal or non-critical lab and imaging results will be communicated to you by MyChart, letter or phone within 4 business days after the clinic has received the results. If you do not hear from us within 7 days, please contact the clinic through MyChart or phone. If you have a critical or abnormal lab result, we will notify you by phone as soon as possible.  Submit refill requests through [employer] or call your pharmacy and they will forward the refill request to us. Please allow 3 business days for your refill to be completed.          Additional Information About Your Visit        MyChart Information     [employer] gives you secure access to your electronic health record. If you see a primary care provider, you can also send messages to your care team and make appointments. If you have questions, please call your primary care clinic.  If you do not have a primary care provider, please call [phone] and they will assist you.        Care EveryWhere ID     This is your Care EveryWhere ID. This could be used by other organizations to  "access your [facility] medical records  [id number]        Your Vitals Were     Pulse Temperature Height Pulse Oximetry Breastfeeding? BMI (Body Mass Index)    74 97.6  F (36.4  C) (Oral) 5' 9\" (1.753 m) 100% No 25.8 kg/m2       Blood Pressure from Last 3 Encounters:   11/06/17 102/66   10/24/17 117/70   01/07/17 123/77    Weight from Last 3 Encounters:   11/06/17 174 lb 11.2 oz (79.2 kg)   10/24/17 175 lb (79.4 kg)   01/07/17 167 lb 9.6 oz (76 kg)              We Performed the Following     CBC with platelets     Comprehensive metabolic panel     Lipid panel reflex to direct LDL Non-fasting     TSH with free T4 reflex          Today's Medication Changes          These changes are accurate as of: 11/6/17  8:16 AM.  If you have any questions, ask your nurse or doctor.               These medicines have changed or have updated prescriptions.        Dose/Directions    ALPRAZolam 1 MG tablet   Commonly known as:  XANAX   This may have changed:  See the new instructions.   Used for:  Anxiety   Changed by:  Tera Keith MD        TAKE 1 TAB AT NIGHT AS NEEDED FOR ANXIETY   Quantity:  90 tablet   Refills:  0       * buPROPion 150 MG 24 hr tablet   Commonly known as:  WELLBUTRIN XL   This may have changed:  Another medication with the same name was changed. Make sure you understand how and when to take each.   Used for:  Recurrent major depressive disorder, in partial remission (H)   Changed by:  Tera Keith MD        Take 1 of the 150mg along with one of the 300mg tablets for total daily dose of 450mg   Quantity:  90 tablet   Refills:  3       * buPROPion 300 MG 24 hr tablet   Commonly known as:  WELLBUTRIN XL   This may have changed:  See the new instructions.   Used for:  Recurrent major depressive disorder, in partial remission (H)   Changed by:  Tera Keith MD        TAKE 1 TABLET BY MOUTH EVERY DAY ALONG WITH THE 150MG TABLET   Quantity:  90 tablet   Refills:  3       melatonin 5 MG Caps "   This may have changed:  Another medication with the same name was removed. Continue taking this medication, and follow the directions you see here.   Changed by:  Tera Keith MD        Dose:  5 mg   Take 5 mg by mouth At Bedtime   Refills:  0       * Notice:  This list has 2 medication(s) that are the same as other medications prescribed for you. Read the directions carefully, and ask your doctor or other care provider to review them with you.         Where to get your medicines      These medications were sent to [facility]/pharmacy #[id number] - [facility] [address] [place] AT [facility]  [address], [facility] [address]     Phone:  [phone]     buPROPion 150 MG 24 hr tablet    buPROPion 300 MG 24 hr tablet         Some of these will need a paper prescription and others can be bought over the counter.  Ask your nurse if you have questions.     Bring a paper prescription for each of these medications     ALPRAZolam 1 MG tablet                Primary Care Provider Office Phone # Fax #    Tera Keith [phone] [address]  [facility] [address]        Equal Access to Services     [facility] AH: Hadii aad ku hadasho Sosatishali, waaxda luqadaha, qaybta kaalmada adeegyada, lidia wu . So [facility] [phone].    ATENCIÓN: Si habla español, tiene a westfall disposición servicios gratuitos de asistencia lingüística. [facility] [phone].    We comply with applicable federal civil rights laws and [place] laws. We do not discriminate on the basis of race, color, national origin, age, disability, sex, sexual orientation, or gender identity.            Thank you!     Thank you for choosing [facility]  for your care. Our goal is always to provide you with excellent care. Hearing back from our patients is one way we can continue to improve our services. Please take a few minutes to complete the written  survey that you may receive in the mail after your visit with us. Thank you!             Your Updated Medication List - Protect others around you: Learn how to safely use, store and throw away your medicines at www.disposemymeds.org.          This list is accurate as of: 11/6/17  8:16 AM.  Always use your most recent med list.                   Brand Name Dispense Instructions for use Diagnosis    ADVIL 200 MG tablet   Generic drug:  ibuprofen      Take 400 mg by mouth every 4 hours as needed        ALPRAZolam 1 MG tablet    XANAX    90 tablet    TAKE 1 TAB AT NIGHT AS NEEDED FOR ANXIETY    Anxiety       aspirin 81 MG tablet     1 tablet    Take 1 tablet by mouth daily.    ASCVD (arteriosclerotic cardiovascular disease)       BENADRYL 25 MG capsule   Generic drug:  diphenhydrAMINE      Take 25 mg by mouth Take 2 tablets at night prn for sleep        * buPROPion 150 MG 24 hr tablet    WELLBUTRIN XL    90 tablet    Take 1 of the 150mg along with one of the 300mg tablets for total daily dose of 450mg    Recurrent major depressive disorder, in partial remission (H)       * buPROPion 300 MG 24 hr tablet    WELLBUTRIN XL    90 tablet    TAKE 1 TABLET BY MOUTH EVERY DAY ALONG WITH THE 150MG TABLET    Recurrent major depressive disorder, in partial remission (H)       carvedilol 3.125 MG tablet    COREG    60 tablet    Take 1 tablet (3.125 mg) by mouth See Admin Instructions    [place] (dyspnea on exertion)       DAILY MULTIVITAMIN PO      Take 1 tablet by mouth daily.    Routine general medical examination at a health care facility, Anxiety and depression, MVP (mitral valve prolapse)       estradiol 0.1 MG/GM cream    ESTRACE     Place 2 g vaginally twice a week        IBUPROFEN PM PO           melatonin 5 MG Caps      Take 5 mg by mouth At Bedtime        rosuvastatin 20 MG tablet    CRESTOR    30 tablet    Take 1 tablet (20 mg) by mouth daily    Coronary artery disease due to lipid rich plaque       TYLENOL 325 MG tablet    Generic drug:  acetaminophen      Take 325-650 mg by mouth every 6 hours as needed.        * Notice:  This list has 2 medication(s) that are the same as other medications prescribed for you. Read the directions carefully, and ask your doctor or other care provider to review them with you.

## 2017-11-06 NOTE — NURSING NOTE
"Chief Complaint   Patient presents with     Physical       Initial /66 (BP Location: Left arm, Patient Position: Chair, Cuff Size: Adult Large)  Pulse 74  Temp 97.6  F (36.4  C) (Oral)  Ht 5' 9\" (1.753 m)  Wt 174 lb 11.2 oz (79.2 kg)  SpO2 100%  Breastfeeding? No  BMI 25.8 kg/m2 Estimated body mass index is 25.8 kg/(m^2) as calculated from the following:    Height as of this encounter: 5' 9\" (1.753 m).    Weight as of this encounter: 174 lb 11.2 oz (79.2 kg).  Medication Reconciliation: complete.  Emily Phillips CMA    "

## 2017-11-07 NOTE — PROGRESS NOTES
Mrs. Garcia,    It was a pleasure seeing you for your physical examination.  I wanted to get back to you with your test results.  I have enclosed a copy for your review.      I am happy to report that your cbc or complete blood count is normal with no signs of anemia, leukemia or platelet abnormalities. Your chemistry panel shows no signs of diabetes.  Your blood salts, kidney tests, liver tests, and proteins are all fine.    Your total cholesterol is 162 with the normal range being below 200.  Your HDL or good cholesterol is 56 with the normal range being above 50.  Your LDL or bad cholesterol is 81 with the normal range being below 130.  While these numbers are better then before I do think you should try the crestor and if you have problems let me know.    Your tsh or thyroid test is just barely off.  A 2nd thyroid test or t4 is normal.  The bottom line is that it has not changed much in years and nothing needs to be done about this except to repeat it next year.    I am happy to bring you this overall excellent report.  Please try to exercise more and eat a healthy diet.  If you have any questions please call me.    Tera Keith M.D.

## 2017-12-28 NOTE — LETTER
10/24/2017    Tera Keith MD  [address]  [place] [address]    RE: Madison Garcia       Dear Colleague,    I had the pleasure of seeing Madison Garcia in the [facility].    Cardiology Progress Note          Assessment and Plan:     1. Mitral valve repair with repair-induced mitral stenosis, mild-moderate    Trial of carvedilol 3.125 mg daily to help with her exertional dyspnea.  May go up to 6.25 mg or 9.375 mg.    Echocardiogram appears stable.      2. Mild coronary artery disease seen on angiogram 2013    Suboptimal LDL control    Change simvastatin 40 mg to rosuvastatin 20 mg and let us know if she gets myalgias.    She may refill her medications with her primary physician.  Follow-up in around 3-5 years with echocardiogram, sooner if symptoms arise.    This note was transcribed using electronic voice recognition software and there may be typographical errors present.                Interval History:   The patient is a very pleasant 57 year old whom I have been following since minimally invasive mitral valve repair at [facility] 2013.  She has some repair-induced mitral stenosis that has been relatively stable with mean gradient 6-7 mmHg.  Her gradient goes up higher than 10 post exercise.  She feels that her exercise tolerance is diminished but stable.  She did have some mild coronary artery disease on her angiogram at the time of her surgery.  She is on simvastatin 40 mg with .  She had myalgias on atorvastatin.  She has never tried rosuvastatin.  She was previously on atenolol and metoprolol to try to plug her exercise-induced heart rate and help her dyspnea.  She had some lightheadedness with those medications and possible mood effects of the metoprolol.                       Review of Systems:   Review of Systems:  Skin:  Negative     Eyes:  Positive for glasses  ENT:  Negative    Respiratory:  Positive for dyspnea on exertion  Cardiovascular:    Positive  "for;dizziness  Gastroenterology: Negative    Genitourinary:  not assessed    Musculoskeletal:  Negative    Neurologic:  Negative    Psychiatric:  Positive for depression;anxiety  Heme/Lymph/Imm:  Negative    Endocrine:  Negative                Physical Exam:     Vitals: /70  Pulse 76  Ht 1.753 m (5' 9\")  Wt 79.4 kg (175 lb)  BMI 25.84 kg/m2  Constitutional:  cooperative, alert and oriented, well developed, well nourished, in no acute distress        Skin:  warm and dry to the touch, no apparent skin lesions or masses noted surgical scars well-healed      Head:  normocephalic, no masses or lesions        Eyes:       eyeglasses    ENT:  no pallor or cyanosis, dentition good        Neck:  JVP normal        Chest:  normal breath sounds, clear to auscultation, normal A-P diameter, normal symmetry, normal respiratory excursion, no use of accessory muscles        Cardiac: regular rhythm;normal S1 and S2       systolic murmur;grade 1          Abdomen:      benign    Extremities and Back:  no deformities, clubbing, cyanosis, erythema observed        Neurological:  affect appropriate, oriented to time, person and place;no gross motor deficits                 Medications:     Current Outpatient Prescriptions   Medication Sig Dispense Refill     estradiol (ESTRACE) 0.1 MG/GM cream Place 2 g vaginally twice a week       rosuvastatin (CRESTOR) 20 MG tablet Take 1 tablet (20 mg) by mouth daily 30 tablet 11     carvedilol (COREG) 3.125 MG tablet Take 1 tablet (3.125 mg) by mouth See Admin Instructions 60 tablet 11     buPROPion (WELLBUTRIN XL) 300 MG 24 hr tablet TAKE 1 TABLET BY MOUTH EVERY DAY ALONG WITH THE 150MG TABLET 90 tablet 0     ALPRAZolam (XANAX) 1 MG tablet TAKE 1 TAB AT NIGHT AS NEEDED FOR ANXIETY 90 tablet 0     buPROPion (WELLBUTRIN XL) 150 MG 24 hr tablet Take 1 of the 150mg along with one of the 300mg tablets for total daily dose of 450mg 270 tablet 0     melatonin 3 MG CAPS Take 6 mg by mouth daily       " Ibuprofen-Diphenhydramine Cit (IBUPROFEN PM PO)        diphenhydrAMINE (BENADRYL) 25 MG capsule Take 25 mg by mouth Take 2 tablets at night prn for sleep       ibuprofen (ADVIL) 200 MG tablet Take 400 mg by mouth every 4 hours as needed       acetaminophen (TYLENOL) 325 MG tablet Take 325-650 mg by mouth every 6 hours as needed.       aspirin 81 MG tablet Take 1 tablet by mouth daily. 1 tablet 3     Multiple Vitamin (DAILY MULTIVITAMIN PO) Take 1 tablet by mouth daily.                  Data:   All laboratory data reviewed  No results found for this or any previous visit (from the past 24 hour(s)).    All laboratory data reviewed  Lab Results   Component Value Date    CHOL 193 11/04/2016     Lab Results   Component Value Date    HDL 57 11/04/2016     Lab Results   Component Value Date     11/04/2016     Lab Results   Component Value Date    TRIG 123 11/04/2016     Lab Results   Component Value Date    CHOLHDLRATIO 3.4 11/02/2015     TSH   Date Value Ref Range Status   10/22/2013 4.89 0.4 - 5.0 mU/L Final     Last Basic Metabolic Panel:  Lab Results   Component Value Date     11/04/2016      Lab Results   Component Value Date    POTASSIUM 4.0 11/04/2016     Lab Results   Component Value Date    CHLORIDE 104 11/04/2016     Lab Results   Component Value Date    JANIS 9.4 11/04/2016     Lab Results   Component Value Date    CO2 30 11/04/2016     Lab Results   Component Value Date    BUN 14 11/04/2016     Lab Results   Component Value Date    CR 0.88 11/04/2016     Lab Results   Component Value Date    GLC 88 11/04/2016     Lab Results   Component Value Date    WBC 4.2 11/04/2016     Lab Results   Component Value Date    RBC 4.56 11/04/2016     Lab Results   Component Value Date    HGB 13.4 11/04/2016     Lab Results   Component Value Date    HCT 40.3 11/04/2016     Lab Results   Component Value Date    MCV 88 11/04/2016     Lab Results   Component Value Date    MCH 29.4 11/04/2016     Lab Results   Component  Value Date    MCHC 33.3 11/04/2016     Lab Results   Component Value Date    RDW 12.4 11/04/2016     Lab Results   Component Value Date     11/04/2016     Thank you for allowing me to participate in the care of your patient.    Sincerely,     Casey Roberts MD     [facility]     no radiation

## 2018-01-23 ENCOUNTER — MYC MEDICAL ADVICE (OUTPATIENT)
Dept: CARDIOLOGY | Facility: CLINIC | Age: 58
End: 2018-01-23

## 2018-01-23 DIAGNOSIS — I25.83 CORONARY ARTERY DISEASE DUE TO LIPID RICH PLAQUE: ICD-10-CM

## 2018-01-23 DIAGNOSIS — I25.10 CORONARY ARTERY DISEASE DUE TO LIPID RICH PLAQUE: ICD-10-CM

## 2018-01-23 RX ORDER — ROSUVASTATIN CALCIUM 20 MG/1
20 TABLET, COATED ORAL DAILY
Qty: 90 TABLET | Refills: 3 | Status: SHIPPED | OUTPATIENT
Start: 2018-01-23 | End: 2018-11-08

## 2018-01-23 NOTE — TELEPHONE ENCOUNTER
Received Viv[employer] message from patient:   Hi,     When I was in last fall you changed my cholesterol medication to Rosuvastatin - 20 mg.  It is currently on a 30 day supply.  My insurance would like 90 day supplies for long term maintenance medications.  Could you please call in a 90 day prescription into the [facility] in [place] on [address]     Thanks,     Madison Garcia     OV 10/24/17 with Dr. Alejandra Collier. Mild coronary artery disease seen on angiogram 2013    Suboptimal LDL control    Change simvastatin 40 mg to rosuvastatin 20 mg and let us know if she gets myalgias.      Refill for 90 day supply sent to pharmacy of choice.

## 2018-04-24 ENCOUNTER — TRANSFERRED RECORDS (OUTPATIENT)
Dept: HEALTH INFORMATION MANAGEMENT | Facility: CLINIC | Age: 58
End: 2018-04-24

## 2018-05-01 ENCOUNTER — TRANSFERRED RECORDS (OUTPATIENT)
Dept: HEALTH INFORMATION MANAGEMENT | Facility: CLINIC | Age: 58
End: 2018-05-01

## 2018-05-03 ENCOUNTER — TRANSFERRED RECORDS (OUTPATIENT)
Dept: HEALTH INFORMATION MANAGEMENT | Facility: CLINIC | Age: 58
End: 2018-05-03

## 2018-05-30 DIAGNOSIS — F41.9 ANXIETY: ICD-10-CM

## 2018-05-30 RX ORDER — ALPRAZOLAM 1 MG
TABLET ORAL
Qty: 90 TABLET | Refills: 0 | Status: SHIPPED | OUTPATIENT
Start: 2018-05-30 | End: 2018-08-21

## 2018-05-30 NOTE — TELEPHONE ENCOUNTER
ALPRAZolam (XANAX) 1 MG tablet      Last Written Prescription Date:  11/6/17  Last Fill Quantity: 90,   # refills: 0  Last Office Visit: 11/6/17  Future Office visit:       Routing refill request to provider for review/approval because:  Drug not on the [facility], P or [facility] refill protocol or controlled substance

## 2018-08-21 DIAGNOSIS — F41.9 ANXIETY: ICD-10-CM

## 2018-08-22 RX ORDER — ALPRAZOLAM 1 MG
TABLET ORAL
Qty: 90 TABLET | Refills: 0 | Status: SHIPPED | OUTPATIENT
Start: 2018-08-22 | End: 2018-11-08

## 2018-08-22 NOTE — TELEPHONE ENCOUNTER
Pending Prescriptions:                       Disp   Refills    ALPRAZolam (XANAX) 1 MG tablet [Pharmacy *90 tab*0            Sig: TAKE 1 TABLET BY MOUTH EVERY DAY AT BEDTIME AS           NEEDED FOR ANXIETY          Last Written Prescription Date:  05/30/18  Last Fill Quantity: 90,   # refills: 0  Last Office Visit: 11/06/17  Future Office visit:       Routing refill request to provider for review/approval because:  Drug not on the [facility], P or [facility] refill protocol or controlled substance

## 2018-09-17 DIAGNOSIS — F33.41 RECURRENT MAJOR DEPRESSIVE DISORDER, IN PARTIAL REMISSION (H): Chronic | ICD-10-CM

## 2018-09-17 RX ORDER — BUPROPION HYDROCHLORIDE 300 MG/1
TABLET ORAL
Qty: 90 TABLET | Refills: 3 | Status: CANCELLED | OUTPATIENT
Start: 2018-09-17

## 2018-09-17 RX ORDER — BUPROPION HYDROCHLORIDE 150 MG/1
TABLET ORAL
Qty: 90 TABLET | Refills: 3 | Status: CANCELLED | OUTPATIENT
Start: 2018-09-17

## 2018-09-18 NOTE — TELEPHONE ENCOUNTER
"Bupropion 150 mg    Last Written Prescription Date:  11/06/17  Last Fill Quantity: 90 tablets,  # refills: 3   Last office visit: 11/6/2017 with prescribing provider:  Inna Rodriguez Office Visit:      Bupropion 300 mg    Last Written Prescription Date:  11/06/17  Last Fill Quantity: 90 tablets,  # refills: 3   Last office visit: 11/6/2017 with prescribing provider:  Inna Rodriguez Office Visit:      Requested Prescriptions   Pending Prescriptions Disp Refills     buPROPion (WELLBUTRIN XL) 150 MG 24 hr tablet 90 tablet 3     Sig: Take 1 of the 150mg along with one of the 300mg tablets for total daily dose of 450mg    SSRIs Protocol Failed    9/17/2018  5:48 PM       Failed - PHQ-9 score less than 5 in past 6 months    Please review last PHQ-9 score.          Failed - Recent (6 mo) or future (30 days) visit within the authorizing provider's specialty    Patient had office visit in the last 6 months or has a visit in the next 30 days with authorizing provider or within the authorizing provider's specialty.  See \"Patient Info\" tab in inbasket, or \"Choose Columns\" in Meds & Orders section of the refill encounter.           Passed - Medication is Bupropion    If the medication is Bupropion (Wellbutrin), and the patient is taking for smoking cessation; OK to refill.         Passed - Patient is age 18 or older       Passed - No active pregnancy on record       Passed - No positive pregnancy test in last 12 months        buPROPion (WELLBUTRIN XL) 300 MG 24 hr tablet 90 tablet 3     Sig: TAKE 1 TABLET BY MOUTH EVERY DAY ALONG WITH THE 150MG TABLET    SSRIs Protocol Failed    9/17/2018  5:48 PM       Failed - PHQ-9 score less than 5 in past 6 months    Please review last PHQ-9 score.          Failed - Recent (6 mo) or future (30 days) visit within the authorizing provider's specialty    Patient had office visit in the last 6 months or has a visit in the next 30 days with authorizing provider or within the authorizing " "provider's specialty.  See \"Patient Info\" tab in inbasket, or \"Choose Columns\" in Meds & Orders section of the refill encounter.           Passed - Medication is Bupropion    If the medication is Bupropion (Wellbutrin), and the patient is taking for smoking cessation; OK to refill.         Passed - Patient is age 18 or older       Passed - No active pregnancy on record       Passed - No positive pregnancy test in last 12 months        PHQ-9 SCORE 11/2/2015 11/4/2016 11/6/2017   Total Score - - -   Total Score 7 5 4       "

## 2018-09-19 ENCOUNTER — MYC MEDICAL ADVICE (OUTPATIENT)
Dept: FAMILY MEDICINE | Facility: CLINIC | Age: 58
End: 2018-09-19

## 2018-09-26 DIAGNOSIS — F33.41 RECURRENT MAJOR DEPRESSIVE DISORDER, IN PARTIAL REMISSION (H): Chronic | ICD-10-CM

## 2018-09-27 RX ORDER — BUPROPION HYDROCHLORIDE 300 MG/1
TABLET ORAL
Start: 2018-09-27

## 2018-09-27 NOTE — TELEPHONE ENCOUNTER
"Requested Prescriptions   Pending Prescriptions Disp Refills     buPROPion (WELLBUTRIN XL) 300 MG 24 hr tablet  Last Written Prescription Date:  11/06/2017  Last Fill Quantity: 90,  # refills: 3   Last office visit: 11/6/2017 with prescribing provider:   GEOVANY  Future Office Visit:   Next 5 appointments (look out 90 days)     Nov 08, 2018  8:00 AM CST   PHYSICAL with Tera Keith MD   [facility]    [address] [address]   [phone]                  90 tablet 3     Sig: TAKE 1 TABLET BY MOUTH EVERY DAY ALONG WITH THE 150MG TABLET    SSRIs Protocol Failed    9/26/2018  9:14 PM       Failed - PHQ-9 score less than 5 in past 6 months    Please review last PHQ-9 score.   PHQ-9 SCORE 11/2/2015 11/4/2016 11/6/2017   Total Score - - -   Total Score 7 5 4     No flowsheet data found.               Failed - Recent (6 mo) or future (30 days) visit within the authorizing provider's specialty    Patient had office visit in the last 6 months or has a visit in the next 30 days with authorizing provider or within the authorizing provider's specialty.  See \"Patient Info\" tab in inbasket, or \"Choose Columns\" in Meds & Orders section of the refill encounter.           Passed - Medication is Bupropion    If the medication is Bupropion (Wellbutrin), and the patient is taking for smoking cessation; OK to refill.         Passed - Patient is age 18 or older       Passed - No active pregnancy on record       Passed - No positive pregnancy test in last 12 months          "

## 2018-11-08 ENCOUNTER — OFFICE VISIT (OUTPATIENT)
Dept: FAMILY MEDICINE | Facility: CLINIC | Age: 58
End: 2018-11-08
Payer: COMMERCIAL

## 2018-11-08 VITALS
OXYGEN SATURATION: 98 % | HEART RATE: 84 BPM | TEMPERATURE: 97.7 F | BODY MASS INDEX: 26.07 KG/M2 | WEIGHT: 176 LBS | DIASTOLIC BLOOD PRESSURE: 74 MMHG | SYSTOLIC BLOOD PRESSURE: 106 MMHG | HEIGHT: 69 IN

## 2018-11-08 DIAGNOSIS — F41.9 ANXIETY: ICD-10-CM

## 2018-11-08 DIAGNOSIS — R79.89 ELEVATED TSH: ICD-10-CM

## 2018-11-08 DIAGNOSIS — Z00.00 ROUTINE GENERAL MEDICAL EXAMINATION AT A HEALTH CARE FACILITY: Primary | ICD-10-CM

## 2018-11-08 DIAGNOSIS — E78.5 HYPERLIPIDEMIA LDL GOAL <100: Chronic | ICD-10-CM

## 2018-11-08 DIAGNOSIS — I34.1 MVP (MITRAL VALVE PROLAPSE): Chronic | ICD-10-CM

## 2018-11-08 DIAGNOSIS — G47.00 INSOMNIA, UNSPECIFIED TYPE: Chronic | ICD-10-CM

## 2018-11-08 DIAGNOSIS — R91.8 LUNG NODULES: ICD-10-CM

## 2018-11-08 DIAGNOSIS — I25.10 CORONARY ARTERY DISEASE DUE TO LIPID RICH PLAQUE: ICD-10-CM

## 2018-11-08 DIAGNOSIS — I25.83 CORONARY ARTERY DISEASE DUE TO LIPID RICH PLAQUE: ICD-10-CM

## 2018-11-08 DIAGNOSIS — R06.09 DOE (DYSPNEA ON EXERTION): ICD-10-CM

## 2018-11-08 DIAGNOSIS — F33.41 RECURRENT MAJOR DEPRESSIVE DISORDER, IN PARTIAL REMISSION (H): Chronic | ICD-10-CM

## 2018-11-08 DIAGNOSIS — K63.5 POLYP OF COLON, UNSPECIFIED PART OF COLON, UNSPECIFIED TYPE: ICD-10-CM

## 2018-11-08 DIAGNOSIS — Z23 NEED FOR VACCINATION: ICD-10-CM

## 2018-11-08 LAB
ALBUMIN SERPL-MCNC: 3.6 G/DL (ref 3.4–5)
ALP SERPL-CCNC: 63 U/L (ref 40–150)
ALT SERPL W P-5'-P-CCNC: 33 U/L (ref 0–50)
ANION GAP SERPL CALCULATED.3IONS-SCNC: 7 MMOL/L (ref 3–14)
AST SERPL W P-5'-P-CCNC: 22 U/L (ref 0–45)
BILIRUB SERPL-MCNC: 0.5 MG/DL (ref 0.2–1.3)
BUN SERPL-MCNC: 12 MG/DL (ref 7–30)
CALCIUM SERPL-MCNC: 9.2 MG/DL (ref 8.5–10.1)
CHLORIDE SERPL-SCNC: 106 MMOL/L (ref 94–109)
CHOLEST SERPL-MCNC: 148 MG/DL
CO2 SERPL-SCNC: 29 MMOL/L (ref 20–32)
CREAT SERPL-MCNC: 0.88 MG/DL (ref 0.52–1.04)
ERYTHROCYTE [DISTWIDTH] IN BLOOD BY AUTOMATED COUNT: 12.2 % (ref 10–15)
GFR SERPL CREATININE-BSD FRML MDRD: 67 ML/MIN/1.7M2
GLUCOSE SERPL-MCNC: 86 MG/DL (ref 70–99)
HCT VFR BLD AUTO: 39.2 % (ref 35–47)
HDLC SERPL-MCNC: 52 MG/DL
HGB BLD-MCNC: 12.7 G/DL (ref 11.7–15.7)
LDLC SERPL CALC-MCNC: 65 MG/DL
MCH RBC QN AUTO: 28.5 PG (ref 26.5–33)
MCHC RBC AUTO-ENTMCNC: 32.4 G/DL (ref 31.5–36.5)
MCV RBC AUTO: 88 FL (ref 78–100)
NONHDLC SERPL-MCNC: 96 MG/DL
PLATELET # BLD AUTO: 285 10E9/L (ref 150–450)
POTASSIUM SERPL-SCNC: 4 MMOL/L (ref 3.4–5.3)
PROT SERPL-MCNC: 7.4 G/DL (ref 6.8–8.8)
RBC # BLD AUTO: 4.45 10E12/L (ref 3.8–5.2)
SODIUM SERPL-SCNC: 142 MMOL/L (ref 133–144)
T4 FREE SERPL-MCNC: 0.93 NG/DL (ref 0.76–1.46)
TRIGL SERPL-MCNC: 156 MG/DL
TSH SERPL DL<=0.005 MIU/L-ACNC: 4.26 MU/L (ref 0.4–4)
WBC # BLD AUTO: 4.5 10E9/L (ref 4–11)

## 2018-11-08 PROCEDURE — 85027 COMPLETE CBC AUTOMATED: CPT | Performed by: INTERNAL MEDICINE

## 2018-11-08 PROCEDURE — 99396 PREV VISIT EST AGE 40-64: CPT | Mod: 25 | Performed by: INTERNAL MEDICINE

## 2018-11-08 PROCEDURE — 80053 COMPREHEN METABOLIC PANEL: CPT | Performed by: INTERNAL MEDICINE

## 2018-11-08 PROCEDURE — 80061 LIPID PANEL: CPT | Performed by: INTERNAL MEDICINE

## 2018-11-08 PROCEDURE — 36415 COLL VENOUS BLD VENIPUNCTURE: CPT | Performed by: INTERNAL MEDICINE

## 2018-11-08 PROCEDURE — 84439 ASSAY OF FREE THYROXINE: CPT | Performed by: INTERNAL MEDICINE

## 2018-11-08 PROCEDURE — 90471 IMMUNIZATION ADMIN: CPT | Performed by: INTERNAL MEDICINE

## 2018-11-08 PROCEDURE — 90714 TD VACC NO PRESV 7 YRS+ IM: CPT | Performed by: INTERNAL MEDICINE

## 2018-11-08 PROCEDURE — 84443 ASSAY THYROID STIM HORMONE: CPT | Performed by: INTERNAL MEDICINE

## 2018-11-08 PROCEDURE — 87389 HIV-1 AG W/HIV-1&-2 AB AG IA: CPT | Performed by: INTERNAL MEDICINE

## 2018-11-08 RX ORDER — ROSUVASTATIN CALCIUM 20 MG/1
20 TABLET, COATED ORAL DAILY
Qty: 90 TABLET | Refills: 3 | Status: SHIPPED | OUTPATIENT
Start: 2018-11-08 | End: 2019-12-02

## 2018-11-08 RX ORDER — BUPROPION HYDROCHLORIDE 300 MG/1
TABLET ORAL
Qty: 90 TABLET | Refills: 3 | Status: SHIPPED | OUTPATIENT
Start: 2018-11-08 | End: 2019-12-02

## 2018-11-08 RX ORDER — ALPRAZOLAM 1 MG
TABLET ORAL
Qty: 90 TABLET | Refills: 0 | Status: SHIPPED | OUTPATIENT
Start: 2018-11-08 | End: 2019-02-11

## 2018-11-08 RX ORDER — BUPROPION HYDROCHLORIDE 150 MG/1
TABLET ORAL
Qty: 90 TABLET | Refills: 3 | Status: SHIPPED | OUTPATIENT
Start: 2018-11-08 | End: 2019-12-02

## 2018-11-08 NOTE — PROGRESS NOTES
SUBJECTIVE:   CC: Madison Garcia is an 58 year old woman who presents for preventive health visit.     She is doing well, no new issues, some chronic dyspnea on exertion, not new or changed, depressiion doing fairly well, using xanax nightly for years and works ok, no signs abuse.      Healthy Habits:    Do you get at least three servings of calcium containing foods daily (dairy, green leafy vegetables, etc.)? yes    Amount of exercise or daily activities, outside of work: 3 day(s) per week    Problems taking medications regularly No    Medication side effects: No    Have you had an eye exam in the past two years? yes    Do you see a dentist twice per year? yes    Do you have sleep apnea, excessive snoring or daytime drowsiness?no          Today's PHQ-2 Score:   PHQ-2 ( 1999 [employer]) 11/3/2017 9/26/2013   Q1: Little interest or pleasure in doing things 1 0   Q2: Feeling down, depressed or hopeless 1 0   PHQ-2 Score 2 0   Q1: Little interest or pleasure in doing things Several days -   Q2: Feeling down, depressed or hopeless Several days -   PHQ-2 Score 2 -       Abuse: Current or Past(Physical, Sexual or Emotional)- No  Do you feel safe in your environment - Yes    Social History   Substance Use Topics     Smoking status: Never Smoker     Smokeless tobacco: Never Used     Alcohol use 0.0 oz/week     0 Standard drinks or equivalent per week      Comment: 1-2/week      If you drink alcohol do you typically have >3 drinks per day or >7 drinks per week? No                Past Medical History:      Past Medical History:   Diagnosis Date     Abdominal pain 2008 and 2010    ct neg 2008, ovar us 2010 with fallopian cyst and fibroids     Anxiety and depression 90's     ASCVD (arteriosclerotic cardiovascular disease) 12/12    pos est, ct angio and cards fu, 25-49% lad     Chronic cystitis     Dr. Mcgrath     Chronic insomnia     for years, seen in sleep clinic 2016     Colonic polyp fu nl 2008    fu due 2013, fu done 2014  and nl, to fu 2019     [place] (dyspnea on exertion)     est echo nl 2015, ct chest and pulm eval by Dr. Quiroga and no cause found     Elevated TSH 11/2017     Hyperlipidaemia      Leg pain 2014    stopped lipitor     Lung nodules 01/2012    seen on coronary calcium ct, fu 1 year, fu done 12/12 and likely benign, fu done 2016 and no change, Dr. Quiroga     MVP (mitral valve prolapse) 2013     Had it surgically repaired, Robotic, done [facility] 6/13 seen by cardiology 2011, echo 2007 with mild mr and nl lv fxn, fu echo 12/11 with lv size upper limits of nl, nl ef, no wma, mild lae, mvp and mild mr present     Palpitations      Screening Jan 2012    cor ct score 0             Past Surgical History:      Past Surgical History:   Procedure Laterality Date     HERNIA REPAIR       REPAIR VALVE MITRAL  6/13    done at [facility], robotic             Social History:     Social History     Social History     Marital status:      Spouse name: N/A     Number of children: 2     Years of education: N/A     Occupational History      [employer]     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      Social History Main Topics     Smoking status: Never Smoker     Smokeless tobacco: Never Used     Alcohol use 0.0 oz/week     0 Standard drinks or equivalent per week      Comment: 1-2/week      Drug use: No     Sexual activity: Yes     Partners: Male     Birth control/ protection: Condom     Other Topics Concern     Caffeine Concern Yes     2 cans of pop per day     Special Diet Yes     vegetarian     Exercise Yes     3x per week      Social History Narrative             Family History:   reviewed         Allergies:     Allergies   Allergen Reactions     Albumin (Human) Difficulty breathing     Atorvastatin Muscle Pain (Myalgia)     Erythromycin      GI upset, intolerance             Medications:     Current Outpatient Prescriptions   Medication Sig Dispense Refill     acetaminophen (TYLENOL) 325 MG tablet Take 325-650 mg by mouth every 6  "hours as needed.       ALPRAZolam (XANAX) 1 MG tablet TAKE 1 TABLET BY MOUTH EVERY DAY AT BEDTIME AS NEEDED FOR ANXIETY 90 tablet 0     aspirin 81 MG tablet Take 1 tablet by mouth daily. 1 tablet 3     buPROPion (WELLBUTRIN XL) 150 MG 24 hr tablet Take 1 of the 150mg along with one of the 300mg tablets for total daily dose of 450mg 90 tablet 3     buPROPion (WELLBUTRIN XL) 300 MG 24 hr tablet TAKE 1 TABLET BY MOUTH EVERY DAY ALONG WITH THE 150MG TABLET 90 tablet 3     diphenhydrAMINE (BENADRYL) 25 MG capsule Take 25 mg by mouth Take 2 tablets at night prn for sleep       ibuprofen (ADVIL) 200 MG tablet Take 400 mg by mouth every 4 hours as needed       Ibuprofen-Diphenhydramine Cit (IBUPROFEN PM PO)        melatonin 5 MG CAPS Take 5 mg by mouth At Bedtime       Multiple Vitamin (DAILY MULTIVITAMIN PO) Take 1 tablet by mouth daily.       rosuvastatin (CRESTOR) 20 MG tablet Take 1 tablet (20 mg) by mouth daily 90 tablet 3     [DISCONTINUED] buPROPion (WELLBUTRIN XL) 150 MG 24 hr tablet Take 1 of the 150mg along with one of the 300mg tablets for total daily dose of 450mg 90 tablet 3     [DISCONTINUED] buPROPion (WELLBUTRIN XL) 300 MG 24 hr tablet TAKE 1 TABLET BY MOUTH EVERY DAY ALONG WITH THE 150MG TABLET 90 tablet 3     [DISCONTINUED] rosuvastatin (CRESTOR) 20 MG tablet Take 1 tablet (20 mg) by mouth daily 90 tablet 3               Review of Systems:   The 10 point Review of Systems is negative other than noted in the HPI           Physical Exam:   Blood pressure 106/74, pulse 84, temperature 97.7  F (36.5  C), temperature source Oral, height 5' 9\" (1.753 m), weight 176 lb (79.8 kg), SpO2 98 %, not currently breastfeeding.    Exam:  Constitutional: healthy appearing, alert and in no distress  Heent: Normocephalic. Head without obvious masses or lesions. PERRLDC, EOMI. Mouth exam within normal limits: tongue, mucous membranes, posterior pharynx all normal, no lesions or abnormalities seen.  Tm's and canals within " normal limits bilaterally. Neck supple, no nuchal rigidity or masses. No supraclavicular, or cervical adenopathy. Thyroid symmetric, no masses.  Cardiovascular: Regular rate and rhythm, no murmer, rub or gallops.  JVP not elevated, no edema.  Carotids within normal limits bilaterally, no bruits.  Respiratory: Normal respiratory effort.  Lungs clear, normal flow, no wheezing or crackles.  Breasts: Normal bilaterally.  No masses or lesions.  Nipples within normal limits.  No axillary lesions or nodes.  My M.A. Was present during this part of the examination.  Gastrointestinal: Normal active bowel sounds.   Soft, not tender, no masses, guarding or rebound.  No hepatosplenomegaly.   Musculoskeletal: extremities normal, no gross deformities noted.  Skin: no suspicious lesions or rashes   Neurologic: Mental status within normal limits.  Speech fluent.  No gross motor abnormalities and gait intact.  Psychiatric: mentation appears normal and affect normal.         Data:   Labs sent        Assessment:   1. Normal complete physical exam  2. Mvp, repaired, follow up cards every 3 to 5 years  3. Lung nodules, have been stable and no follow up  4. Ascvd, med mgmt  5. Chronic dyspnea on exertion, doubt cv, pulmonary embolis, etc  6. Elevated cholesterol on statin  7. Chronic insomnia, stable  8. Depression, stable  9. hcm         Plan:   Exercise, diet  Letter with labs  Td today  Up to date flu shot  Up to date colon  Up to date pap  Call if problems      Tera Keith M.D.

## 2018-11-08 NOTE — MR AVS SNAPSHOT
After Visit Summary   11/8/2018    Madison Garcia    MRN: [id number]           Patient Information     Date Of Birth          1960        Visit Information        Provider Department      11/8/2018 8:00 AM Tera Keith MD [facility]        Today's Diagnoses     Routine general medical examination at a health care facility    -  1    Anxiety        Recurrent major depressive disorder, in partial remission (H)        Coronary artery disease due to lipid rich plaque        Hyperlipidemia LDL goal <100        Polyp of colon, unspecified part of colon, unspecified type        Elevated TSH        Insomnia, unspecified type        Lung nodules        [place] (dyspnea on exertion)          Care Instructions      Preventive Health Recommendations  Female Ages 50 - 64    Yearly exam: See your health care provider every year in order to  o Review health changes.   o Discuss preventive care.    o Review your medicines if your doctor has prescribed any.      Get a Pap test every three years (unless you have an abnormal result and your provider advises testing more often).    If you get Pap tests with HPV test, you only need to test every 5 years, unless you have an abnormal result.     You do not need a Pap test if your uterus was removed (hysterectomy) and you have not had cancer.    You should be tested each year for STDs (sexually transmitted diseases) if you're at risk.     Have a mammogram every 1 to 2 years.    Have a colonoscopy at age 50, or have a yearly FIT test (stool test). These exams screen for colon cancer.      Have a cholesterol test every 5 years, or more often if advised.    Have a diabetes test (fasting glucose) every three years. If you are at risk for diabetes, you should have this test more often.     If you are at risk for osteoporosis (brittle bone disease), think about having a bone density scan (DEXA).    Shots: Get a flu shot each year. Get a tetanus shot every 10  years.    Nutrition:     Eat at least 5 servings of fruits and vegetables each day.    Eat whole-grain bread, whole-wheat pasta and brown rice instead of white grains and rice.    Get adequate Calcium and Vitamin D.     Lifestyle    Exercise at least 150 minutes a week (30 minutes a day, 5 days a week). This will help you control your weight and prevent disease.    Limit alcohol to one drink per day.    No smoking.     Wear sunscreen to prevent skin cancer.     See your dentist every six months for an exam and cleaning.    See your eye doctor every 1 to 2 years.            Follow-ups after your visit        Follow-up notes from your care team     Return in about 1 year (around 11/8/2019) for Physical Exam.      Who to contact     If you have questions or need follow up information about today's clinic visit or your schedule please contact [facility] directly at [phone].  Normal or non-critical lab and imaging results will be communicated to you by MyChart, letter or phone within 4 business days after the clinic has received the results. If you do not hear from us within 7 days, please contact the clinic through ????hart or phone. If you have a critical or abnormal lab result, we will notify you by phone as soon as possible.  Submit refill requests through [email or web address] or call your pharmacy and they will forward the refill request to us. Please allow 3 business days for your refill to be completed.          Additional Information About Your Visit        MyChart Information     [email or web address] gives you secure access to your electronic health record. If you see a primary care provider, you can also send messages to your care team and make appointments. If you have questions, please call your primary care clinic.  If you do not have a primary care provider, please call [phone] and they will assist you.        Care EveryWhere ID     This is your Care EveryWhere ID. This could be used by other organizations to  "access your [facility] medical records  [id number]        Your Vitals Were     Pulse Temperature Height Pulse Oximetry Breastfeeding? BMI (Body Mass Index)    84 97.7  F (36.5  C) (Oral) 5' 9\" (1.753 m) 98% No 25.99 kg/m2       Blood Pressure from Last 3 Encounters:   11/08/18 106/74   11/06/17 102/66   10/24/17 117/70    Weight from Last 3 Encounters:   11/08/18 176 lb (79.8 kg)   11/06/17 174 lb 11.2 oz (79.2 kg)   10/24/17 175 lb (79.4 kg)              We Performed the Following     CBC with platelets     Comprehensive metabolic panel     HIV Antigen Antibody Combo     Lipid panel reflex to direct LDL Non-fasting     TSH with free T4 reflex          Today's Medication Changes          These changes are accurate as of 11/8/18  8:14 AM.  If you have any questions, ask your nurse or doctor.               Stop taking these medicines if you haven't already. Please contact your care team if you have questions.     carvedilol 3.125 MG tablet   Commonly known as:  COREG   Stopped by:  Tera Keith MD           estradiol 0.1 MG/GM cream   Commonly known as:  ESTRACE   Stopped by:  Tera Keith MD                Where to get your medicines      These medications were sent to [facility]/pharmacy #[id number] - [place], [place] - [address] [place] [address] AT [address] [address]     Phone:  [phone]     buPROPion 150 MG 24 hr tablet    buPROPion 300 MG 24 hr tablet    rosuvastatin 20 MG tablet         Some of these will need a paper prescription and others can be bought over the counter.  Ask your nurse if you have questions.     Bring a paper prescription for each of these medications     ALPRAZolam 1 MG tablet                Primary Care Provider Office Phone # Fax #    Tera Keith [phone] [address] [facility] [address]  [facility] [address]        Equal Access to Services     BOBBY TAYLOR AH: jorge Arriaga, " lidia moreira ah. So [facility] [phone].    ATENCIÓN: Si justo marquez, tiene a westfall disposición servicios gratuitos de asistencia lingüística. [place] al [phone].    We comply with applicable federal civil rights laws and [place] laws. We do not discriminate on the basis of race, color, national origin, age, disability, sex, sexual orientation, or gender identity.            Thank you!     Thank you for choosing [facility]  for your care. Our goal is always to provide you with excellent care. Hearing back from our patients is one way we can continue to improve our services. Please take a few minutes to complete the written survey that you may receive in the mail after your visit with us. Thank you!             Your Updated Medication List - Protect others around you: Learn how to safely use, store and throw away your medicines at www.disposemymeds.org.          This list is accurate as of 11/8/18  8:14 AM.  Always use your most recent med list.                   Brand Name Dispense Instructions for use Diagnosis    ADVIL 200 MG tablet   Generic drug:  ibuprofen      Take 400 mg by mouth every 4 hours as needed        ALPRAZolam 1 MG tablet    XANAX    90 tablet    TAKE 1 TABLET BY MOUTH EVERY DAY AT BEDTIME AS NEEDED FOR ANXIETY    Anxiety       aspirin 81 MG tablet     1 tablet    Take 1 tablet by mouth daily.    ASCVD (arteriosclerotic cardiovascular disease)       BENADRYL 25 MG capsule   Generic drug:  diphenhydrAMINE      Take 25 mg by mouth Take 2 tablets at night prn for sleep        * buPROPion 300 MG 24 hr tablet    WELLBUTRIN XL    90 tablet    TAKE 1 TABLET BY MOUTH EVERY DAY ALONG WITH THE 150MG TABLET    Recurrent major depressive disorder, in partial remission (H)       * buPROPion 150 MG 24 hr tablet    WELLBUTRIN XL    90 tablet    Take 1 of the 150mg along with one of the 300mg tablets for total daily dose of 450mg    Recurrent major  depressive disorder, in partial remission (H)       DAILY MULTIVITAMIN PO      Take 1 tablet by mouth daily.    Routine general medical examination at a health care facility, Anxiety and depression, MVP (mitral valve prolapse)       IBUPROFEN PM PO           melatonin 5 MG Caps      Take 5 mg by mouth At Bedtime        rosuvastatin 20 MG tablet    CRESTOR    90 tablet    Take 1 tablet (20 mg) by mouth daily    Coronary artery disease due to lipid rich plaque       TYLENOL 325 MG tablet   Generic drug:  acetaminophen      Take 325-650 mg by mouth every 6 hours as needed.        * Notice:  This list has 2 medication(s) that are the same as other medications prescribed for you. Read the directions carefully, and ask your doctor or other care provider to review them with you.

## 2018-11-09 LAB — HIV 1+2 AB+HIV1 P24 AG SERPL QL IA: NONREACTIVE

## 2018-11-09 NOTE — PROGRESS NOTES
It was a pleasure seeing you for your physical examination.  I wanted to get back to you with your test results.  I have enclosed a copy for your review.      I am happy to report that your cbc or complete blood count is normal with no signs of anemia, leukemia or platelet abnormalities. Your chemistry panel shows no signs of diabetes.  Your blood salts, kidney tests, liver tests, thyroid test, hiv test, and proteins are all fine.    Your total cholesterol is 148 with the normal range being below 200.  Your HDL or good cholesterol is 52 with the normal range being above 50.  Your LDL or bad cholesterol is 65 with the normal range being below 130.  These numbers are just fine.    I am happy to bring you this overall excellent report.  Please be sure to keep up the exercise and eat a healthy diet.  If you have any questions please call me.    Tera Keith M.D.

## 2018-11-15 ENCOUNTER — TRANSFERRED RECORDS (OUTPATIENT)
Dept: HEALTH INFORMATION MANAGEMENT | Facility: CLINIC | Age: 58
End: 2018-11-15

## 2019-02-11 DIAGNOSIS — F41.9 ANXIETY: ICD-10-CM

## 2019-02-13 RX ORDER — ALPRAZOLAM 1 MG
TABLET ORAL
Qty: 90 TABLET | Refills: 0 | Status: SHIPPED | OUTPATIENT
Start: 2019-02-13 | End: 2019-05-16

## 2019-02-13 NOTE — TELEPHONE ENCOUNTER
Last [facility] website verification:  done on 02/13/20109 [place]  [email or web address]

## 2019-04-25 ENCOUNTER — TRANSFERRED RECORDS (OUTPATIENT)
Dept: HEALTH INFORMATION MANAGEMENT | Facility: CLINIC | Age: 59
End: 2019-04-25

## 2019-05-16 DIAGNOSIS — F41.9 ANXIETY: ICD-10-CM

## 2019-05-16 RX ORDER — ALPRAZOLAM 1 MG
TABLET ORAL
Qty: 90 TABLET | Refills: 0 | Status: SHIPPED | OUTPATIENT
Start: 2019-05-16 | End: 2019-08-16

## 2019-05-16 NOTE — TELEPHONE ENCOUNTER
ALPRAZolam (XANAX) 1 MG tablet 90 tablet 0 2/13/2019           Last Written Prescription Date:  02/13/2019  Last Fill Quantity: 90,   # refills: 0  Last Office Visit: 11/08/2018  Future Office visit:   Unknown    Routing refill request to provider for review/approval because:  Drug not on the [facility], [facility] or [facility] refill protocol or controlled substance

## 2019-05-16 NOTE — TELEPHONE ENCOUNTER
Last [facility] website verification 5/16/19 [place]     [email or web address]

## 2019-08-16 DIAGNOSIS — F41.9 ANXIETY: ICD-10-CM

## 2019-08-16 RX ORDER — ALPRAZOLAM 1 MG
TABLET ORAL
Qty: 90 TABLET | Refills: 0 | Status: SHIPPED | OUTPATIENT
Start: 2019-08-16 | End: 2019-11-10

## 2019-08-16 NOTE — TELEPHONE ENCOUNTER
Patient is followed by ZAYDA ARMENTA for ongoing prescription of benzodiazepines.  All refills should be approved by this provider, or covering partner.    Medication(s): Alprazolam (Xanax) 1mg.   Maximum quantity per month: #90/90 days  Clinic visit frequency required: Q 6  months     Controlled substance agreement on file: No  Benzodiazepine use reviewed by psychiatry:  No    Last [facility] website verification 08/16/19 NO concerns.     [email or web address]    Alprazolam (Xanax) 1mg.      Last Written Prescription Date:  5/16/19  Last Fill Quantity: 90,   # refills: 0  Last Office Visit: 11/18/19  Future Office visit:   DUE- placed note on RX    Routing refill request to provider for review/approval because:  Drug not on the [facility], [facility] or [facility] refill protocol or controlled substance

## 2019-10-02 ENCOUNTER — HEALTH MAINTENANCE LETTER (OUTPATIENT)
Age: 59
End: 2019-10-02

## 2019-11-10 DIAGNOSIS — F41.9 ANXIETY: ICD-10-CM

## 2019-11-11 RX ORDER — ALPRAZOLAM 1 MG
TABLET ORAL
Qty: 90 TABLET | Refills: 0 | Status: SHIPPED | OUTPATIENT
Start: 2019-11-11 | End: 2020-02-06

## 2019-11-11 NOTE — TELEPHONE ENCOUNTER
Last [facility] website verification 11/11/19 [place]  NO concerns.   [email or web address]    Next 5 appointments (look out 90 days)    Dec 02, 2019  8:30 AM CST  PHYSICAL with Tera Keith MD  [facility] ([facility] [address] [facility] [address]  [phone]        Ni MANJARREZ RN

## 2019-11-11 NOTE — TELEPHONE ENCOUNTER
Controlled Substance Refill Request for   ALPRAZolam (XANAX) 1 MG tablet 90 tablet 0 8/16/2019     Problem List Complete:  Yes    Last Written Prescription Date:  8/16/2019  Last Fill Quantity: 90,   # refills: 0    THE MOST RECENT OFFICE VISIT MUST BE WITHIN THE PAST 3 MONTHS. AT LEAST ONE FACE TO FACE VISIT MUST OCCUR EVERY 6 MONTHS. ADDITIONAL VISITS CAN BE VIRTUAL.  (THIS STATEMENT SHOULD BE DELETED.)    [place] [facility] [place]: 11/8/2018    Future Office visit:   Next 5 appointments (look out 90 days)    Dec 02, 2019  8:30 AM CST  PHYSICAL with Tera Keith MD  [facility] [address] [id number] [phone]          Controlled substance agreement:   Encounter-Level CSA:    There are no encounter-level csa.     Patient-Level CSA:    There are no patient-level csa.         Last Urine Drug Screen: No results found for: CDAUT, No results found for: COMDAT, No results found for: THC13, PCP13, COC13, MAMP13, OPI13, AMP13, BZO13, TCA13, MTD13, BAR13, OXY13, PPX13, BUP13     Processing:  Rx to be electronically transmitted to pharmacy by provider     [email or web address]   checked in past 3 months?  Yes - 8/16/2019

## 2019-12-02 ENCOUNTER — OFFICE VISIT (OUTPATIENT)
Dept: FAMILY MEDICINE | Facility: CLINIC | Age: 59
End: 2019-12-02
Payer: COMMERCIAL

## 2019-12-02 ENCOUNTER — ANCILLARY PROCEDURE (OUTPATIENT)
Dept: GENERAL RADIOLOGY | Facility: CLINIC | Age: 59
End: 2019-12-02
Attending: INTERNAL MEDICINE
Payer: COMMERCIAL

## 2019-12-02 VITALS
HEART RATE: 74 BPM | SYSTOLIC BLOOD PRESSURE: 123 MMHG | OXYGEN SATURATION: 100 % | BODY MASS INDEX: 25.92 KG/M2 | HEIGHT: 69 IN | TEMPERATURE: 97.4 F | WEIGHT: 175 LBS | DIASTOLIC BLOOD PRESSURE: 77 MMHG

## 2019-12-02 DIAGNOSIS — I25.10 CORONARY ARTERY DISEASE DUE TO LIPID RICH PLAQUE: ICD-10-CM

## 2019-12-02 DIAGNOSIS — E78.5 HYPERLIPIDEMIA LDL GOAL <100: Chronic | ICD-10-CM

## 2019-12-02 DIAGNOSIS — R06.09 DOE (DYSPNEA ON EXERTION): ICD-10-CM

## 2019-12-02 DIAGNOSIS — R07.89 CHEST TIGHTNESS: ICD-10-CM

## 2019-12-02 DIAGNOSIS — G47.00 INSOMNIA, UNSPECIFIED TYPE: Chronic | ICD-10-CM

## 2019-12-02 DIAGNOSIS — F33.41 RECURRENT MAJOR DEPRESSIVE DISORDER, IN PARTIAL REMISSION (H): Chronic | ICD-10-CM

## 2019-12-02 DIAGNOSIS — Z00.00 ROUTINE GENERAL MEDICAL EXAMINATION AT A HEALTH CARE FACILITY: Primary | ICD-10-CM

## 2019-12-02 DIAGNOSIS — R79.89 ELEVATED TSH: ICD-10-CM

## 2019-12-02 DIAGNOSIS — F41.9 ANXIETY: ICD-10-CM

## 2019-12-02 DIAGNOSIS — I25.83 CORONARY ARTERY DISEASE DUE TO LIPID RICH PLAQUE: ICD-10-CM

## 2019-12-02 DIAGNOSIS — K63.5 POLYP OF COLON, UNSPECIFIED PART OF COLON, UNSPECIFIED TYPE: ICD-10-CM

## 2019-12-02 LAB
ALBUMIN SERPL-MCNC: 3.8 G/DL (ref 3.4–5)
ALP SERPL-CCNC: 66 U/L (ref 40–150)
ALT SERPL W P-5'-P-CCNC: 30 U/L (ref 0–50)
ANION GAP SERPL CALCULATED.3IONS-SCNC: 7 MMOL/L (ref 3–14)
AST SERPL W P-5'-P-CCNC: 20 U/L (ref 0–45)
BILIRUB SERPL-MCNC: 0.6 MG/DL (ref 0.2–1.3)
BUN SERPL-MCNC: 15 MG/DL (ref 7–30)
CALCIUM SERPL-MCNC: 9.7 MG/DL (ref 8.5–10.1)
CHLORIDE SERPL-SCNC: 107 MMOL/L (ref 94–109)
CHOLEST SERPL-MCNC: 152 MG/DL
CO2 SERPL-SCNC: 27 MMOL/L (ref 20–32)
CREAT SERPL-MCNC: 0.96 MG/DL (ref 0.52–1.04)
ERYTHROCYTE [DISTWIDTH] IN BLOOD BY AUTOMATED COUNT: 12.5 % (ref 10–15)
GFR SERPL CREATININE-BSD FRML MDRD: 65 ML/MIN/{1.73_M2}
GLUCOSE SERPL-MCNC: 86 MG/DL (ref 70–99)
HCT VFR BLD AUTO: 39.4 % (ref 35–47)
HDLC SERPL-MCNC: 53 MG/DL
HGB BLD-MCNC: 12.9 G/DL (ref 11.7–15.7)
LDLC SERPL CALC-MCNC: 73 MG/DL
MCH RBC QN AUTO: 29.1 PG (ref 26.5–33)
MCHC RBC AUTO-ENTMCNC: 32.7 G/DL (ref 31.5–36.5)
MCV RBC AUTO: 89 FL (ref 78–100)
NONHDLC SERPL-MCNC: 99 MG/DL
PLATELET # BLD AUTO: 302 10E9/L (ref 150–450)
POTASSIUM SERPL-SCNC: 4.3 MMOL/L (ref 3.4–5.3)
PROT SERPL-MCNC: 7.3 G/DL (ref 6.8–8.8)
RBC # BLD AUTO: 4.43 10E12/L (ref 3.8–5.2)
SODIUM SERPL-SCNC: 141 MMOL/L (ref 133–144)
T4 FREE SERPL-MCNC: 1.04 NG/DL (ref 0.76–1.46)
TRIGL SERPL-MCNC: 128 MG/DL
TSH SERPL DL<=0.005 MIU/L-ACNC: 5.54 MU/L (ref 0.4–4)
WBC # BLD AUTO: 4.1 10E9/L (ref 4–11)

## 2019-12-02 PROCEDURE — 71046 X-RAY EXAM CHEST 2 VIEWS: CPT

## 2019-12-02 PROCEDURE — 36415 COLL VENOUS BLD VENIPUNCTURE: CPT | Performed by: INTERNAL MEDICINE

## 2019-12-02 PROCEDURE — 85027 COMPLETE CBC AUTOMATED: CPT | Performed by: INTERNAL MEDICINE

## 2019-12-02 PROCEDURE — 99213 OFFICE O/P EST LOW 20 MIN: CPT | Mod: 25 | Performed by: INTERNAL MEDICINE

## 2019-12-02 PROCEDURE — 84443 ASSAY THYROID STIM HORMONE: CPT | Performed by: INTERNAL MEDICINE

## 2019-12-02 PROCEDURE — 96127 BRIEF EMOTIONAL/BEHAV ASSMT: CPT | Performed by: INTERNAL MEDICINE

## 2019-12-02 PROCEDURE — 99396 PREV VISIT EST AGE 40-64: CPT | Performed by: INTERNAL MEDICINE

## 2019-12-02 PROCEDURE — 80061 LIPID PANEL: CPT | Performed by: INTERNAL MEDICINE

## 2019-12-02 PROCEDURE — 80053 COMPREHEN METABOLIC PANEL: CPT | Performed by: INTERNAL MEDICINE

## 2019-12-02 PROCEDURE — 84439 ASSAY OF FREE THYROXINE: CPT | Performed by: INTERNAL MEDICINE

## 2019-12-02 RX ORDER — ROSUVASTATIN CALCIUM 20 MG/1
20 TABLET, COATED ORAL DAILY
Qty: 90 TABLET | Refills: 3 | Status: SHIPPED | OUTPATIENT
Start: 2019-12-02 | End: 2020-12-10

## 2019-12-02 RX ORDER — BUPROPION HYDROCHLORIDE 150 MG/1
TABLET ORAL
Qty: 90 TABLET | Refills: 3 | Status: SHIPPED | OUTPATIENT
Start: 2019-12-02 | End: 2020-12-10

## 2019-12-02 RX ORDER — BUPROPION HYDROCHLORIDE 300 MG/1
TABLET ORAL
Qty: 90 TABLET | Refills: 3 | Status: SHIPPED | OUTPATIENT
Start: 2019-12-02 | End: 2020-12-10

## 2019-12-02 ASSESSMENT — MIFFLIN-ST. JEOR: SCORE: 1435.23

## 2019-12-02 ASSESSMENT — PATIENT HEALTH QUESTIONNAIRE - PHQ9: SUM OF ALL RESPONSES TO PHQ QUESTIONS 1-9: 2

## 2019-12-02 NOTE — PROGRESS NOTES
SUBJECTIVE:   CC: Madison Garcia is an 59 year old woman who presents for preventive health visit.     Overall she is doing well but she does note chest tightness and some bilateral ankle edema.  Is been a week or 2.  She has her chronic dyspnea on exertion with stairs which is not new.  Minimal cough.  She feels her anxiety and depression are under good control.  She otherwise feels well with no other complaints.    Healthy Habits:    Getting at least 3 servings of Calcium per day:  Yes    Bi-annual eye exam:  Yes    Dental care twice a year:  Yes    Sleep apnea or symptoms of sleep apnea:  None    Diet:  Vegetarian/vegan    Frequency of exercise:  2-3 days/week    Duration of exercise:  30-45 minutes    Taking medications regularly:  Yes    Barriers to taking medications:  None    Medication side effects:  None    PHQ-2 Total Score:    Additional concerns today:  No              Today's PHQ-2 Score:   PHQ-2 ( 1999 [employer]) 11/8/2018   Q1: Little interest or pleasure in doing things 0   Q2: Feeling down, depressed or hopeless 0   PHQ-2 Score 0   Q1: Little interest or pleasure in doing things -   Q2: Feeling down, depressed or hopeless -   PHQ-2 Score -       Abuse: Current or Past(Physical, Sexual or Emotional)- No  Do you feel safe in your environment? Yes    Have you ever done Advance Care Planning? (For example, a Health Directive, POLST, or a discussion with a medical provider or your loved ones about your wishes): Yes, advance care planning is on file.                Past Medical History:      Past Medical History:   Diagnosis Date     Abdominal pain 2008 and 2010    ct neg 2008, ovar us 2010 with fallopian cyst and fibroids     Anxiety and depression 90's     ASCVD (arteriosclerotic cardiovascular disease) 12/12    pos est, ct angio and cards fu, 25-49% lad     Chronic cystitis     Dr. Mcgrath     Chronic insomnia     for years, seen in sleep clinic 2016     Colonic polyp fu nl 2008    fu due 2013, fu done  2014 and nl, to fu 2019     [place] (dyspnea on exertion)     est echo nl 2015, ct chest and pulm eval by Dr. Quiroga and no cause found     Elevated TSH 11/2017     Hyperlipidaemia      Leg pain 2014    stopped lipitor     Lung nodules 01/2012    seen on coronary calcium ct, fu 1 year, fu done 12/12 and likely benign, fu done 2016 and no change, Dr. Quiroga     MVP (mitral valve prolapse) 2013     Had it surgically repaired, Robotic, done [place] 6/13 seen by cardiology 2011, echo 2007 with mild mr and nl lv fxn, fu echo 12/11 with lv size upper limits of nl, nl ef, no wma, mild lae, mvp and mild mr present     Palpitations      Screening Jan 2012    cor ct score 0             Past Surgical History:      Past Surgical History:   Procedure Laterality Date     HERNIA REPAIR       REPAIR VALVE MITRAL  6/13    done at [place], robotic             Social History:     Social History     Socioeconomic History     Marital status:      Spouse name: Not on file     Number of children: 2     Years of education: Not on file     Highest education level: Not on file   Occupational History     Occupation:      Employer: [employer]     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   Social Needs     Financial resource strain: Not on file     Food insecurity:     Worry: Not on file     Inability: Not on file     Transportation needs:     Medical: Not on file     Non-medical: Not on file   Tobacco Use     Smoking status: Never Smoker     Smokeless tobacco: Never Used   Substance and Sexual Activity     Alcohol use: Yes     Alcohol/week: 0.0 standard drinks     Comment: 1-2/week      Drug use: No     Sexual activity: Yes     Partners: Male     Birth control/protection: Condom   Lifestyle     Physical activity:     Days per week: Not on file     Minutes per session: Not on file     Stress: Not on file   Relationships     Social connections:     Talks on phone: Not on file     Gets together: Not on file     Attends  Gnosticist service: Not on file     Active member of club or organization: Not on file     Attends meetings of clubs or organizations: Not on file     Relationship status: Not on file     Intimate partner violence:     Fear of current or ex partner: Not on file     Emotionally abused: Not on file     Physically abused: Not on file     Forced sexual activity: Not on file   Other Topics Concern      Service Not Asked     Blood Transfusions Not Asked     Caffeine Concern Yes     Comment: 2 cans of pop per day     Occupational Exposure Not Asked     Hobby Hazards Not Asked     Sleep Concern Not Asked     Stress Concern Not Asked     Weight Concern Not Asked     Special Diet Yes     Comment: vegetarian     Back Care Not Asked     Exercise Yes     Comment: 3x per week      Bike Helmet Not Asked     Seat Belt Not Asked     Self-Exams Not Asked     Parent/sibling w/ CABG, MI or angioplasty before 65F 55M? Not Asked   Social History Narrative     Not on file             Family History:   reviewed         Allergies:     Allergies   Allergen Reactions     Albumin (Human) Difficulty breathing     Atorvastatin Muscle Pain (Myalgia)     Erythromycin      GI upset, intolerance             Medications:     Current Outpatient Medications   Medication Sig Dispense Refill     acetaminophen (TYLENOL) 325 MG tablet Take 325-650 mg by mouth every 6 hours as needed.       ALPRAZolam (XANAX) 1 MG tablet TAKE 1 TABLET BY MOUTH EVERY DAY AT BEDTIME AS NEEDED FOR ANXIETY 90 tablet 0     aspirin 81 MG tablet Take 1 tablet by mouth daily. 1 tablet 3     buPROPion (WELLBUTRIN XL) 150 MG 24 hr tablet Take 1 of the 150mg along with one of the 300mg tablets for total daily dose of 450mg 90 tablet 3     buPROPion (WELLBUTRIN XL) 300 MG 24 hr tablet TAKE 1 TABLET BY MOUTH EVERY DAY ALONG WITH THE 150MG TABLET 90 tablet 3     ibuprofen (ADVIL) 200 MG tablet Take 400 mg by mouth every 4 hours as needed       Ibuprofen-Diphenhydramine Cit  "(IBUPROFEN PM PO)        melatonin 5 MG CAPS Take 6 mg by mouth At Bedtime        Multiple Vitamin (DAILY MULTIVITAMIN PO) Take 1 tablet by mouth daily.       rosuvastatin (CRESTOR) 20 MG tablet Take 1 tablet (20 mg) by mouth daily 90 tablet 3               Review of Systems:   The 10 point Review of Systems is negative other than noted in the HPI           Physical Exam:   Blood pressure 123/77, pulse 74, temperature 97.4  F (36.3  C), temperature source Tympanic, height 1.74 m (5' 8.5\"), weight 79.4 kg (175 lb), SpO2 100 %, not currently breastfeeding.    Exam:  Constitutional: healthy appearing, alert and in no distress  Heent: Normocephalic. Head without obvious masses or lesions. PERRLDC, EOMI. Mouth exam within normal limits: tongue, mucous membranes, posterior pharynx all normal, no lesions or abnormalities seen.  Tm's and canals within normal limits bilaterally. Neck supple, no nuchal rigidity or masses. No supraclavicular, or cervical adenopathy. Thyroid symmetric, no masses.  Cardiovascular: Regular rate and rhythm, no murmer, rub or gallops.  JVP not elevated, no edema.  Carotids within normal limits bilaterally, no bruits.  Respiratory: Normal respiratory effort.  Lungs clear, normal flow, no wheezing or crackles.  Gastrointestinal: Normal active bowel sounds.   Soft, not tender, no masses, guarding or rebound.  No hepatosplenomegaly.   Musculoskeletal: extremities normal, no gross deformities noted.  Skin: no suspicious lesions or rashes   Neurologic: Mental status within normal limits.  Speech fluent.  No gross motor abnormalities and gait intact.  Psychiatric: mentation appears normal and affect normal.         Data:   Labs sent; cxr to be done        Assessment:   1. Normal complete physical exam  2. Chest tightness, edema, the latter min today.  I will check labs, cxr and est and if neg could do ct but doubt pulmonary embolis or other lung cause  3. Cad, med mgmt  4. Depression, insomnia, doing " well, no signs med abuse  5. Colon polyp, she is consider if she wants follow up  6. Elevated cholesterol, follow up labs  7. Elevated tsh, follow up labs  8. hcm         Plan:   She plans to see gyn  Est, cxr and labs  Exercise, diet  Up to date mammogram      Tera Keith M.D.

## 2019-12-02 NOTE — RESULT ENCOUNTER NOTE
Your chest x-ray looks fine.  There are some minor abnormalities as you can see but nothing significant.    Tera Keith M.D.

## 2019-12-03 NOTE — RESULT ENCOUNTER NOTE
It was a pleasure seeing you for your physical examination.  I wanted to get back to you with your test results.  I have enclosed a copy for your review.     I am happy to report that your cbc or complete blood count is normal with no signs of anemia, leukemia or platelet abnormalities. Your chemistry panel shows no signs of diabetes.  Your blood salts, kidney tests, liver tests, and proteins are all fine.    Your total cholesterol is 152 with the normal range being below 200.  Your HDL or good cholesterol is 53 with the normal range being above 50.  Your LDL or bad cholesterol is 73 with the normal range being below 130.  Overall these numbers are very good.    Your tsh or thyroid test is just barely off, not enough to do anything about but check it again next year.    I am happy to bring you this overall excellent report.  If you have any questions please call me.    Tera Keith M.D.

## 2019-12-16 ENCOUNTER — HEALTH MAINTENANCE LETTER (OUTPATIENT)
Age: 59
End: 2019-12-16

## 2019-12-19 ENCOUNTER — HOSPITAL ENCOUNTER (OUTPATIENT)
Dept: CARDIOLOGY | Facility: CLINIC | Age: 59
Discharge: HOME | End: 2019-12-19
Attending: INTERNAL MEDICINE | Admitting: INTERNAL MEDICINE
Payer: COMMERCIAL

## 2019-12-19 DIAGNOSIS — R07.89 CHEST TIGHTNESS: ICD-10-CM

## 2019-12-19 PROCEDURE — 40000264 ECHO STRESS ECHOCARDIOGRAM

## 2019-12-19 PROCEDURE — 93325 DOPPLER ECHO COLOR FLOW MAPG: CPT | Mod: 26 | Performed by: INTERNAL MEDICINE

## 2019-12-19 PROCEDURE — 93016 CV STRESS TEST SUPVJ ONLY: CPT | Performed by: INTERNAL MEDICINE

## 2019-12-19 PROCEDURE — 93350 STRESS TTE ONLY: CPT | Mod: 26 | Performed by: INTERNAL MEDICINE

## 2019-12-19 PROCEDURE — 93321 DOPPLER ECHO F-UP/LMTD STD: CPT | Mod: 26 | Performed by: INTERNAL MEDICINE

## 2019-12-19 PROCEDURE — 93018 CV STRESS TEST I&R ONLY: CPT | Performed by: INTERNAL MEDICINE

## 2019-12-19 PROCEDURE — 25500064 ZZH RX 255 OP 636: Performed by: INTERNAL MEDICINE

## 2019-12-19 RX ADMIN — HUMAN ALBUMIN MICROSPHERES AND PERFLUTREN 9 ML: 10; .22 INJECTION, SOLUTION INTRAVENOUS at 14:19

## 2019-12-27 ENCOUNTER — TELEPHONE (OUTPATIENT)
Dept: FAMILY MEDICINE | Facility: CLINIC | Age: 59
End: 2019-12-27

## 2019-12-27 NOTE — TELEPHONE ENCOUNTER
"Reviewed with pt.    Update: Symptoms improved  \"Just happening very occasionally, sometimes I do but not on a constant basis at all.\"  Frequency: Only a few times in the last week.    Plan: Pt will call for further assessment if symptoms get worse again.  Rosalinda Mary RN    "

## 2019-12-27 NOTE — TELEPHONE ENCOUNTER
Please call the patient and let her know her stress test is normal.  Is she continuing to have the chest tightness?    Tera Keith M.D.

## 2020-02-04 DIAGNOSIS — F41.9 ANXIETY: ICD-10-CM

## 2020-02-06 RX ORDER — ALPRAZOLAM 1 MG
TABLET ORAL
Qty: 90 TABLET | Refills: 0 | Status: SHIPPED | OUTPATIENT
Start: 2020-02-06 | End: 2020-05-12

## 2020-02-06 NOTE — TELEPHONE ENCOUNTER
Routing refill request to provider for review/approval because:  Drug not on the [facility] refill protocol     RX [place] reviewed:  reviewed- no concerns    [place]:  [email or web address]        Katherine DOLL RN,BSN

## 2020-02-06 NOTE — TELEPHONE ENCOUNTER
Xanax      Last Written Prescription Date:  11/11/19  Last Fill Quantity: 90,   # refills: 0  Last Office Visit: 12/02/19  Future Office visit:       Routing refill request to provider for review/approval because:  Drug not on the [facility], [facility] or [facility] refill protocol or controlled substance    Zulma Wilson MA

## 2020-05-11 DIAGNOSIS — F41.9 ANXIETY: ICD-10-CM

## 2020-05-11 NOTE — TELEPHONE ENCOUNTER
Controlled Substance Refill Request for     ALPRAZolam (XANAX) 1 MG tablet  90 tablet  0  2/6/2020   No    Sig: TAKE 1 TABLET BY MOUTH EVERY DAY AT BEDTIME AS NEEDED FOR ANXIETY      Associated Diagnoses   Anxiety [F41.9]           Last Written Prescription Date:  02/06/2020  Last Fill Quantity: 90,  # refills: 0   Last office visit: 12/2/2019 with prescribing provider:     Future Office Visit:        Problem List Complete:  Yes      Anxiety   Change Dx Resolve      Details  Code: F41.9  Sort Priority: Medium  Noted: 11/4/2014  Share w/ Pt: []       Overview  Edited:  Katherine Smith RN  2/6/2020  Patient is followed by ZAYDA ARMENTA for ongoing prescription of benzodiazepines. All refills should be approved by this provider, or covering partner.     Medication(s): Alprazolam (Xanax) 1mg.   Maximum quantity per month: #90/90 days  Clinic visit frequency required: Q 6 months      Controlled substance agreement on file: No  Benzodiazepine use reviewed by psychiatry: No     Last [facility] website verification 2-6-2020 [place]     [email or web address]                       checked in past 3 months?  No, route to RN

## 2020-05-12 RX ORDER — ALPRAZOLAM 1 MG
TABLET ORAL
Qty: 90 TABLET | Refills: 0 | Status: SHIPPED | OUTPATIENT
Start: 2020-05-12 | End: 2020-08-12

## 2020-05-12 NOTE — TELEPHONE ENCOUNTER
Routing refill request to provider for review/approval because:  Drug not on the [facility] refill protocol   Rosalinda Mary RN

## 2020-05-26 ENCOUNTER — TRANSFERRED RECORDS (OUTPATIENT)
Dept: HEALTH INFORMATION MANAGEMENT | Facility: CLINIC | Age: 60
End: 2020-05-26

## 2020-05-29 ENCOUNTER — TRANSFERRED RECORDS (OUTPATIENT)
Dept: HEALTH INFORMATION MANAGEMENT | Facility: CLINIC | Age: 60
End: 2020-05-29

## 2020-06-01 ENCOUNTER — TRANSFERRED RECORDS (OUTPATIENT)
Dept: HEALTH INFORMATION MANAGEMENT | Facility: CLINIC | Age: 60
End: 2020-06-01

## 2020-06-03 ENCOUNTER — TELEPHONE (OUTPATIENT)
Dept: FAMILY MEDICINE | Facility: CLINIC | Age: 60
End: 2020-06-03

## 2020-06-03 NOTE — TELEPHONE ENCOUNTER
Reason for Call:  Other call back    Detailed comments: Elva from [facility] called to say patient has been diagnosed with Breast Cancer. Where would Dr. Keith like to send patient    Phone Number Patient can be reached at:   Please call Elva at [phone]    Best Time:     Can we leave a detailed message on this number? YES    Call taken on 6/3/2020 at 3:29 PM by Laverne Cali

## 2020-06-04 NOTE — TELEPHONE ENCOUNTER
I called patient, as noted dx with breast ca on routine mammogram.  Dcis.    She will see surgical consultants, Dr. Cam Keith M.D.

## 2020-06-08 ENCOUNTER — TELEPHONE (OUTPATIENT)
Dept: SURGERY | Facility: CLINIC | Age: 60
End: 2020-06-08

## 2020-06-08 NOTE — TELEPHONE ENCOUNTER
PREVISIT INFORMATION                                                    Madison Garcia is scheduled for future visit with Dr. Slater on 8/11/20 at the [facility] specialty clinics.    Reason for visit: breast cancer  Referring provider: Dr. Tera Keith  Have images been done?: Yes   Location: [facility]   Date: 5/26/20, 5/29/20, 6/1/20  Previous pathology reports?: Available in chart  Have previous office records been requested?: No      REVIEW                                                      New patient packet mailed to patient: Yes    PLAN/FOLLOW-UP NEEDED                                                      Previsit review complete.  Patient will see provider at future scheduled appointment.     Patient Reminders Given:    Informed patient to bring an updated list of allergies, medications, pharmacy details and insurance information. Directed patient to come to the 2nd floor, check-in D for their appointment. Informed patient to call back if appointment needs to be cancelled or rescheduled at [phone].    Reminded patient to bring any outside records regarding this appointment or have them faxed to clinic at [phone].    Radha Phillips LPN

## 2020-06-11 ENCOUNTER — OFFICE VISIT (OUTPATIENT)
Dept: SURGERY | Facility: CLINIC | Age: 60
End: 2020-06-11
Payer: COMMERCIAL

## 2020-06-11 VITALS — BODY MASS INDEX: 25.65 KG/M2 | WEIGHT: 173.2 LBS | HEIGHT: 69 IN

## 2020-06-11 DIAGNOSIS — D05.11 NEOPLASM OF RIGHT BREAST, PRIMARY TUMOR STAGING CATEGORY TIS: DUCTAL CARCINOMA IN SITU (DCIS): Primary | ICD-10-CM

## 2020-06-11 PROCEDURE — 99205 OFFICE O/P NEW HI 60 MIN: CPT | Performed by: SURGERY

## 2020-06-11 RX ORDER — DIAZEPAM 5 MG
5 TABLET ORAL ONCE
Qty: 2 TABLET | Refills: 0 | Status: ON HOLD | OUTPATIENT
Start: 2020-06-11 | End: 2020-07-08

## 2020-06-11 ASSESSMENT — MIFFLIN-ST. JEOR: SCORE: 1430.01

## 2020-06-11 NOTE — NURSING NOTE
Madison Garcia's goals for this visit include:   Chief Complaint   Patient presents with     Consult     breast cancer        She requests these members of her care team be copied on today's visit information: Yes    PCP: Tera Keith    Referring Provider:  Tera Keith MD  [address] [address]    There were no vitals taken for this visit.    Do you need any medication refills at today's visit? No    Radha Phillips LPN    .

## 2020-06-11 NOTE — LETTER
6/11/2020         RE: Madison Garcia  [address] [address] [place] [id number]        Dear Colleague,    Thank you for referring your patient, Madison Garcia, to the [facility]. Please see a copy of my visit note below.    [facility] Breast Surgery Consultation    HPI:   Madison Garcia is a 60 year old female who is seen in consultation at the request of Dr. Keith for evaluation of newly diagnosed right breast cancer. She had a screening mammogram on 5/26/2020 which revealed micro calcifications in the upper inner quadrant, posterior depth. She then had diagnostic imaging which revealed a 2cm group of amorphous calcifications at 1:00, middle depth. 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     She reports no breast concerns prior to her biopsy. She had a prior left breast biopsy which revealed fibroadenoma. No other breast surgeries. No breast pain or nipple discharge.     She has had a prior mitral valve repair in 2013. She is otherwise very healthy.         Hormonal history:  menarche 16yo, two adopted children,  Post menopausal, <1 year OCP use, no HRT, no fertility treatment.     Family history of breast cancer: Yes - maternal grandmother in her 70s  Family history of ovarian cancer:  No  Family history of colon cancer: No  Family history of prostate cancer: No    Imaging:     Completed at [facility] and all reviewed    Percutaneous core needle biopsy, right:       Past Medical History:   has a past medical history of Abdominal pain (2008 and 2010), Anxiety and depression (90's), ASCVD (arteriosclerotic cardiovascular disease) (12/12), Chest tightness (12/2019), Chronic cystitis, Chronic insomnia, Colonic polyp (fu nl 2008), [place] (dyspnea on exertion), Elevated TSH (11/2017), Hyperlipidaemia, Leg pain (2014), Lung nodules (01/2012), MVP (mitral valve prolapse) (2013 ), Palpitations, and Screening ([place]  2012).      Current Outpatient Medications:      acetaminophen (TYLENOL) 325 MG tablet, Take 325-650 mg by mouth every 6 hours as needed., Disp: , Rfl:      ALPRAZolam (XANAX) 1 MG tablet, TAKE 1 TABLET BY MOUTH EVERY DAY AT BEDTIME AS NEEDED FOR ANXIETY, Disp: 90 tablet, Rfl: 0     aspirin 81 MG tablet, Take 1 tablet by mouth daily., Disp: 1 tablet, Rfl: 3     buPROPion (WELLBUTRIN XL) 150 MG 24 hr tablet, Take 1 of the 150mg along with one of the 300mg tablets for total daily dose of 450mg, Disp: 90 tablet, Rfl: 3     buPROPion (WELLBUTRIN XL) 300 MG 24 hr tablet, TAKE 1 TABLET BY MOUTH EVERY DAY ALONG WITH THE 150MG TABLET, Disp: 90 tablet, Rfl: 3     ibuprofen (ADVIL) 200 MG tablet, Take 400 mg by mouth every 4 hours as needed, Disp: , Rfl:      Ibuprofen-Diphenhydramine Cit (IBUPROFEN PM PO), , Disp: , Rfl:      melatonin 5 MG CAPS, Take 6 mg by mouth At Bedtime , Disp: , Rfl:      Multiple Vitamin (DAILY MULTIVITAMIN PO), Take 1 tablet by mouth daily., Disp: , Rfl:      rosuvastatin (CRESTOR) 20 MG tablet, Take 1 tablet (20 mg) by mouth daily, Disp: 90 tablet, Rfl: 3    Past Surgical History:  Past Surgical History:   Procedure Laterality Date     HERNIA REPAIR       REPAIR VALVE MITRAL  6/13    done at [facility], robotic           Allergies   Allergen Reactions     Albumin (Human) Difficulty breathing     Atorvastatin Muscle Pain (Myalgia)     Erythromycin      GI upset, intolerance        Social History:  Social History     Socioeconomic History     Marital status:      Spouse name: Not on file     Number of children: 2     Years of education: Not on file     Highest education level: Not on file   Occupational History     Occupation:      Employer: [employer]     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   Social Needs     Financial resource strain: Not on file     Food insecurity     Worry: Not on file     Inability: Not on file     Transportation needs     Medical: Not on file      "Non-medical: Not on file   Tobacco Use     Smoking status: Never Smoker     Smokeless tobacco: Never Used   Substance and Sexual Activity     Alcohol use: Yes     Alcohol/week: 0.0 standard drinks     Comment: 1-2/week      Drug use: No     Sexual activity: Yes     Partners: Male     Birth control/protection: Condom   Lifestyle     Physical activity     Days per week: Not on file     Minutes per session: Not on file     Stress: Not on file   Relationships     Social connections     Talks on phone: Not on file     Gets together: Not on file     Attends Catholic service: Not on file     Active member of club or organization: Not on file     Attends meetings of clubs or organizations: Not on file     Relationship status: Not on file     Intimate partner violence     Fear of current or ex partner: Not on file     Emotionally abused: Not on file     Physically abused: Not on file     Forced sexual activity: Not on file   Other Topics Concern      Service Not Asked     Blood Transfusions Not Asked     Caffeine Concern Yes     Comment: 2 cans of pop per day     Occupational Exposure Not Asked     Hobby Hazards Not Asked     Sleep Concern Not Asked     Stress Concern Not Asked     Weight Concern Not Asked     Special Diet Yes     Comment: vegetarian     Back Care Not Asked     Exercise Yes     Comment: 3x per week      Bike Helmet Not Asked     Seat Belt Not Asked     Self-Exams Not Asked     Parent/sibling w/ CABG, MI or angioplasty before 65F 55M? Not Asked   Social History Narrative     Not on file        ROS:  The 10 point review of systems is negative other than noted in the HPI and above.    PE:  Vitals: Ht 1.753 m (5' 9\")   Wt 78.6 kg (173 lb 3.2 oz)   BMI 25.58 kg/m    General appearance: well-nourished, sitting comfortably, no apparent distress  Psych: normal affect, pleasant  HEENT:  Head normocephalic and atraumatic, pupils equal and round, conjunctivae clear, mucous membranes moist, external ears and " nose normal  Neck: Supple without thyromegaly or masses  Lungs: Respirations unlabored  Lymphatic: No cervical, or supraclavicular lymphadenopathy  Extremities: Without edema  Musculoskeletal:  Normal station and gait  Neurologic: nonfocal, grossly intact times four extremities, alert and oriented times three  Psychiatric: Mood and affect are appropriate  Skin: Without lesions or rashes    Breast:  A bilateral breast exam was performed in the supine position.. Bilateral breasts were palpated in a circumferential clockwise fashion including the supraclavicular and axillary areas.   Breasts are symmetrical. There is slight ecchymosis on right upper inner breast related to her recent biopsy with small underlying hematoma. No masses. Nipples everted bilaterally. Skin otherwise normal. Breast tissue is very dense.     Lymph:       No supraclavicular/infraclavicular adenopathy.   Axillary adenopathy: none    Assessment/Plan:  Madison is a 60yof who unfortunately has been found to have DCIS in the right breast associated with a 2cm area of calcification seen on mammogram.  I reviewed the imaging and pathology reports with her and explained the findings.  We talked about the fact that this is non-invasive, or stage 0, breast cancer that was found on screening mammogram.  We discussed her DCIS is grade 3 and ER negative. She has dense breast tissue and a breast MRI would be helpful to further assess this area and assist in surgical decision making. This will be completed next Tuesday.     We next discussed the surgical options for treatment.  I described the procedures for lumpectomy and mastectomy including the details of the procedures, the risks, anesthesia and expected recovery.  We talked about post-lumpectomy radiation, the course and usual side effects.      We also talked about post-mastectomy reconstruction and the stages involved.  I reviewed the data regarding lumpectomy and radiation vs mastectomy that shows that  the local recurrence risk is slightly higher for lumpectomy and radiation vs mastectomy (5-10% vs. 1-2%), but the survival at 20 years is the same.  I advised that lymph node biopsy is recommended whenever we are dealing with invasive breast cancer, but that if this is just DCIS, no lymph node biopsy is needed.  If she were to have a lumpectomy, I would not do a lymph node biopsy, but if she were to have a mastectomy I would because I would not be able to go back and do a sentinel lymph node biopsy in the event that invasive cancer is found on final pathology.  I described the procedure for sentinel lymph node biopsy.  We talked about the risk for lymphedema which is small with removal of only a few nodes, but certainly not zero.    Lastly, we discussed genetic testing. She will let us know if she would like to have genetic testing.     I will call her with the results of the breast MRI. She would like to have lumpectomy if possible but would like to meet with radiation oncology prior to having surgery to ensure she is comfortable with this plan. If the MRI reveals a larger area of concern, mastectomy would be recommended. She is interested in meeting with plastic surgery to discuss reconstruction if this is the case.         Time spent with the patient with greater that 50% of the time in discussion was 65 minutes.    Elise Slater MD      Please route or send letter to:  Primary Care Provider (PCP) and Referring Provider           Again, thank you for allowing me to participate in the care of your patient.        Sincerely,        Elise Slater MD

## 2020-06-11 NOTE — PROGRESS NOTES
[facility] Breast Surgery Consultation    HPI:   Madison Garcia is a 60 year old female who is seen in consultation at the request of Dr. Keith for evaluation of newly diagnosed right breast cancer. She had a screening mammogram on 5/26/2020 which revealed micro calcifications in the upper inner quadrant, posterior depth. She then had diagnostic imaging which revealed a 2cm group of amorphous calcifications at 1:00, middle depth. 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     She reports no breast concerns prior to her biopsy. She had a prior left breast biopsy which revealed fibroadenoma. No other breast surgeries. No breast pain or nipple discharge.     She has had a prior mitral valve repair in 2013. She is otherwise very healthy.         Hormonal history:  menarche 14yo, two adopted children,  Post menopausal, <1 year OCP use, no HRT, no fertility treatment.     Family history of breast cancer: Yes - maternal grandmother in her 70s  Family history of ovarian cancer:  No  Family history of colon cancer: No  Family history of prostate cancer: No    Imaging:     Completed at [facility] and all reviewed    Percutaneous core needle biopsy, right:       Past Medical History:   has a past medical history of Abdominal pain (2008 and 2010), Anxiety and depression (90's), ASCVD (arteriosclerotic cardiovascular disease) (12/12), Chest tightness (12/2019), Chronic cystitis, Chronic insomnia, Colonic polyp (fu nl 2008), [place] (dyspnea on exertion), Elevated TSH (11/2017), Hyperlipidaemia, Leg pain (2014), Lung nodules (01/2012), MVP (mitral valve prolapse) (2013 ), Palpitations, and Screening (Jan 2012).      Current Outpatient Medications:      acetaminophen (TYLENOL) 325 MG tablet, Take 325-650 mg by mouth every 6 hours as needed., Disp: , Rfl:      ALPRAZolam (XANAX) 1 MG tablet, TAKE 1 TABLET BY MOUTH EVERY DAY AT BEDTIME AS NEEDED FOR  ANXIETY, Disp: 90 tablet, Rfl: 0     aspirin 81 MG tablet, Take 1 tablet by mouth daily., Disp: 1 tablet, Rfl: 3     buPROPion (WELLBUTRIN XL) 150 MG 24 hr tablet, Take 1 of the 150mg along with one of the 300mg tablets for total daily dose of 450mg, Disp: 90 tablet, Rfl: 3     buPROPion (WELLBUTRIN XL) 300 MG 24 hr tablet, TAKE 1 TABLET BY MOUTH EVERY DAY ALONG WITH THE 150MG TABLET, Disp: 90 tablet, Rfl: 3     ibuprofen (ADVIL) 200 MG tablet, Take 400 mg by mouth every 4 hours as needed, Disp: , Rfl:      Ibuprofen-Diphenhydramine Cit (IBUPROFEN PM PO), , Disp: , Rfl:      melatonin 5 MG CAPS, Take 6 mg by mouth At Bedtime , Disp: , Rfl:      Multiple Vitamin (DAILY MULTIVITAMIN PO), Take 1 tablet by mouth daily., Disp: , Rfl:      rosuvastatin (CRESTOR) 20 MG tablet, Take 1 tablet (20 mg) by mouth daily, Disp: 90 tablet, Rfl: 3    Past Surgical History:  Past Surgical History:   Procedure Laterality Date     HERNIA REPAIR       REPAIR VALVE MITRAL  6/13    done at [place], robotic           Allergies   Allergen Reactions     Albumin (Human) Difficulty breathing     Atorvastatin Muscle Pain (Myalgia)     Erythromycin      GI upset, intolerance        Social History:  Social History     Socioeconomic History     Marital status:      Spouse name: Not on file     Number of children: 2     Years of education: Not on file     Highest education level: Not on file   Occupational History     Occupation:      Employer: [employer]     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   Social Needs     Financial resource strain: Not on file     Food insecurity     Worry: Not on file     Inability: Not on file     Transportation needs     Medical: Not on file     Non-medical: Not on file   Tobacco Use     Smoking status: Never Smoker     Smokeless tobacco: Never Used   Substance and Sexual Activity     Alcohol use: Yes     Alcohol/week: 0.0 standard drinks     Comment: 1-2/week      Drug use: No     Sexual  "activity: Yes     Partners: Male     Birth control/protection: Condom   Lifestyle     Physical activity     Days per week: Not on file     Minutes per session: Not on file     Stress: Not on file   Relationships     Social connections     Talks on phone: Not on file     Gets together: Not on file     Attends Amish service: Not on file     Active member of club or organization: Not on file     Attends meetings of clubs or organizations: Not on file     Relationship status: Not on file     Intimate partner violence     Fear of current or ex partner: Not on file     Emotionally abused: Not on file     Physically abused: Not on file     Forced sexual activity: Not on file   Other Topics Concern      Service Not Asked     Blood Transfusions Not Asked     Caffeine Concern Yes     Comment: 2 cans of pop per day     Occupational Exposure Not Asked     Hobby Hazards Not Asked     Sleep Concern Not Asked     Stress Concern Not Asked     Weight Concern Not Asked     Special Diet Yes     Comment: vegetarian     Back Care Not Asked     Exercise Yes     Comment: 3x per week      Bike Helmet Not Asked     Seat Belt Not Asked     Self-Exams Not Asked     Parent/sibling w/ CABG, MI or angioplasty before 65F 55M? Not Asked   Social History Narrative     Not on file        ROS:  The 10 point review of systems is negative other than noted in the HPI and above.    PE:  Vitals: Ht 1.753 m (5' 9\")   Wt 78.6 kg (173 lb 3.2 oz)   BMI 25.58 kg/m    General appearance: well-nourished, sitting comfortably, no apparent distress  Psych: normal affect, pleasant  HEENT:  Head normocephalic and atraumatic, pupils equal and round, conjunctivae clear, mucous membranes moist, external ears and nose normal  Neck: Supple without thyromegaly or masses  Lungs: Respirations unlabored  Lymphatic: No cervical, or supraclavicular lymphadenopathy  Extremities: Without edema  Musculoskeletal:  Normal station and gait  Neurologic: nonfocal, grossly " intact times four extremities, alert and oriented times three  Psychiatric: Mood and affect are appropriate  Skin: Without lesions or rashes    Breast:  A bilateral breast exam was performed in the supine position.. Bilateral breasts were palpated in a circumferential clockwise fashion including the supraclavicular and axillary areas.   Breasts are symmetrical. There is slight ecchymosis on right upper inner breast related to her recent biopsy with small underlying hematoma. No masses. Nipples everted bilaterally. Skin otherwise normal. Breast tissue is very dense.     Lymph:       No supraclavicular/infraclavicular adenopathy.   Axillary adenopathy: none    Assessment/Plan:  Madison is a 60yof who unfortunately has been found to have DCIS in the right breast associated with a 2cm area of calcification seen on mammogram.  I reviewed the imaging and pathology reports with her and explained the findings.  We talked about the fact that this is non-invasive, or stage 0, breast cancer that was found on screening mammogram.  We discussed her DCIS is grade 3 and ER negative. She has dense breast tissue and a breast MRI would be helpful to further assess this area and assist in surgical decision making. This will be completed next Tuesday.     We next discussed the surgical options for treatment.  I described the procedures for lumpectomy and mastectomy including the details of the procedures, the risks, anesthesia and expected recovery.  We talked about post-lumpectomy radiation, the course and usual side effects.      We also talked about post-mastectomy reconstruction and the stages involved.  I reviewed the data regarding lumpectomy and radiation vs mastectomy that shows that the local recurrence risk is slightly higher for lumpectomy and radiation vs mastectomy (5-10% vs. 1-2%), but the survival at 20 years is the same.  I advised that lymph node biopsy is recommended whenever we are dealing with invasive breast cancer,  but that if this is just DCIS, no lymph node biopsy is needed.  If she were to have a lumpectomy, I would not do a lymph node biopsy, but if she were to have a mastectomy I would because I would not be able to go back and do a sentinel lymph node biopsy in the event that invasive cancer is found on final pathology.  I described the procedure for sentinel lymph node biopsy.  We talked about the risk for lymphedema which is small with removal of only a few nodes, but certainly not zero.    Lastly, we discussed genetic testing. She will let us know if she would like to have genetic testing.     I will call her with the results of the breast MRI. She would like to have lumpectomy if possible but would like to meet with radiation oncology prior to having surgery to ensure she is comfortable with this plan. If the MRI reveals a larger area of concern, mastectomy would be recommended. She is interested in meeting with plastic surgery to discuss reconstruction if this is the case.         Time spent with the patient with greater that 50% of the time in discussion was 65 minutes.    Elise Slater MD      Please route or send letter to:  Primary Care Provider (PCP) and Referring Provider

## 2020-06-12 ENCOUNTER — TELEPHONE (OUTPATIENT)
Dept: SURGERY | Facility: CLINIC | Age: 60
End: 2020-06-12

## 2020-06-12 NOTE — TELEPHONE ENCOUNTER
Call placed to patient at request of Dr. Slater. Introduced self and explained my role of oncology care coordinator to patient.     Madison was mailed new patient packet which includes educational material and support resources such as American Cancer Society: For Women Facing Breast Cancer, What You Need to Know about Breast Conservation and Mastectomy Surgery, [employer], Fighting Cancer through Diet and Lifestyle, and [facility] Breast Cancer Support Group.     At the end of the consultation, we reviewed plan of care and education. Will follow-up with Madison after results of Breast MRI have been reviewed.     Madison has my contact information and knows to contact me in the future with any questions/concerns.     Carissa QUILES, RN, OCN  Oncology Care Coordinator  [facility] & [facility]   [phone]

## 2020-06-16 ENCOUNTER — ANCILLARY PROCEDURE (OUTPATIENT)
Dept: MRI IMAGING | Facility: CLINIC | Age: 60
End: 2020-06-16
Attending: SURGERY
Payer: COMMERCIAL

## 2020-06-16 DIAGNOSIS — D05.11 NEOPLASM OF RIGHT BREAST, PRIMARY TUMOR STAGING CATEGORY TIS: DUCTAL CARCINOMA IN SITU (DCIS): ICD-10-CM

## 2020-06-16 PROCEDURE — 77049 MRI BREAST C-+ W/CAD BI: CPT | Performed by: RADIOLOGY

## 2020-06-16 PROCEDURE — A9585 GADOBUTROL INJECTION: HCPCS | Mod: JW | Performed by: RADIOLOGY

## 2020-06-16 RX ORDER — GADOBUTROL 604.72 MG/ML
10 INJECTION INTRAVENOUS ONCE
Status: COMPLETED | OUTPATIENT
Start: 2020-06-16 | End: 2020-06-16

## 2020-06-16 RX ADMIN — GADOBUTROL 8 ML: 604.72 INJECTION INTRAVENOUS at 12:28

## 2020-06-17 ENCOUNTER — TELEPHONE (OUTPATIENT)
Dept: SURGERY | Facility: PHYSICIAN GROUP | Age: 60
End: 2020-06-17

## 2020-06-17 ENCOUNTER — PREP FOR PROCEDURE (OUTPATIENT)
Dept: SURGERY | Facility: CLINIC | Age: 60
End: 2020-06-17

## 2020-06-17 DIAGNOSIS — D05.11 DUCTAL CARCINOMA IN SITU (DCIS) OF RIGHT BREAST: Primary | ICD-10-CM

## 2020-06-17 DIAGNOSIS — Z11.59 ENCOUNTER FOR SCREENING FOR OTHER VIRAL DISEASES: Primary | ICD-10-CM

## 2020-06-17 DIAGNOSIS — D05.11 NEOPLASM OF RIGHT BREAST, PRIMARY TUMOR STAGING CATEGORY TIS: DUCTAL CARCINOMA IN SITU (DCIS): Primary | ICD-10-CM

## 2020-06-17 NOTE — TELEPHONE ENCOUNTER
I called Madison and discussed her breast MRI. It revealed a 1.4cm area of enhancement at the biopsy site with associated hematoma. No other areas of concern in either breast and no axillary lymphadenopathy. She would like to proceed with meeting radiation oncology and scheduling a seed localized lumpectomy. I will have Umm call her to assist in this.     Elise Slater MD  [facility]  [phone]

## 2020-06-17 NOTE — TELEPHONE ENCOUNTER
Type of surgery: Seed localized right breast lumpectomy  Location of surgery: [facility]  Date and time of surgery: 7/8/20 at 11am  Surgeon: Dr. Elise Slater  Pre-Op Appt Date: Patient to schedule  Post-Op Appt Date: Patient to schedule   Packet sent out: Yes  Pre-cert/Authorization completed:  Not Applicable  Date: 6/17/20

## 2020-06-19 ENCOUNTER — CARE COORDINATION (OUTPATIENT)
Dept: SURGERY | Facility: CLINIC | Age: 60
End: 2020-06-19

## 2020-06-19 NOTE — PROGRESS NOTES
Follow-up call placed to Madison. She is now scheduled for a seed localized right breast lumpectomy on 7/8/2020 with Dr. Slater. She has a radiation oncology consult scheduled with Dr. Santillan on Tuesday, June 23. She had general questions related to healing after lumpectomy. All which were answered to her satisfaction. She will complete a pre-op physical and covid testing prior to surgery. I will follow-up with Madison on 7/6/2020. She knows to contact me in the interim with additional questions or concerns.    Carissa Horne RN, BSN, OCN  Oncology Care Coordinator  [facility]  [facility] [place]  Phone: [phone]

## 2020-06-21 NOTE — PROGRESS NOTES
Dear Colleagues,  Today Madison Garcia was seen in consultation.  IDENTIFICATION: This is a 60 year old woman with right UIQ breast ER- G3 DCIS scheduled for lumpectomy on 7/7/2020 being seen preoperatively in consultation.   HISTORY OF PRESENT ILLNESS: Madison Garcia was in her usual state of health a couple of months ago.  As part of her routine care, on On May 26, 2020 she had a screening bilateral mammogram with Abad that showed microcalcifications in the upper inner quadrant of the right breast.  The other breast was unremarkable.  This was followed by diagnostic mammogram completed on May 29, 2020 that showed superior right breast amorphous calcifications.  On June 1, 2020 stereotactic biopsy of the upper inner quadrant right breast lesionsg revealed grade 3 DCIS ER negative.  On June 16, 2020 she had a bilateral breast MRI that showed the biopsy-proven high-grade DCIS measured about 1.4 cm and no other suspicious enhancement in the right or left breast or lymphadenopathy.  She saw Dr. Slater in consultation and is scheduled for a lumpectomy on July 7, 2020.  Currently, she is healing well from her biopsy.  She has good ROM.  She denies any other new masses, skin changes, shortness of breath, chest or bony pain, or new neurologic symptoms. She is being seen here today for consideration of postoperative radiotherapy.  REVIEW OF SYSTEMS: As per HPI, a 14-point review of system is otherwise negative.  PAST RADIATION THERAPY:  Denies.  PAST CTD/PACEMAKER: Denies  BREAST RISK FACTORS: Postmenopausal with menopause at age 52yo. (m) grandmother with breast cancer. No family history of ovarian cancer. She started her menses at age 15.  G1 with miscarriage.     Past Medical History:   Diagnosis Date     Abdominal pain 2008 and 2010    ct neg 2008, ovar us 2010 with fallopian cyst and fibroids     Anxiety and depression 90's     ASCVD (arteriosclerotic cardiovascular disease) 12/12    pos est, ct angio and cards fu,  25-49% lad     Chest tightness 12/2019    neg est echo     Chronic cystitis     Dr. Mcgrath     Chronic insomnia     for years, seen in sleep clinic 2016     Colonic polyp fu nl 2008    fu due 2013, fu done 2014 and nl, to fu 2019     [place] (dyspnea on exertion)     est echo nl 2015, ct chest and pulm eval by Dr. Quiroga and no cause found     Elevated TSH 11/2017     Hyperlipidaemia      Leg pain 2014    stopped lipitor     Lung nodules 01/2012    seen on coronary calcium ct, fu 1 year, fu done 12/12 and likely benign, fu done 2016 and no change, Dr. Quiroga     MVP (mitral valve prolapse) 2013     Had it surgically repaired, Robotic, done [place] 6/13 seen by cardiology 2011, echo 2007 with mild mr and nl lv fxn, fu echo 12/11 with lv size upper limits of nl, nl ef, no wma, mild lae, mvp and mild mr present     Palpitations      Screening Jan 2012    cor ct score 0       Past Surgical History:   Procedure Laterality Date     HERNIA REPAIR       REPAIR VALVE MITRAL  6/13    done at [place], robotic       Family History   Problem Relation Age of Onset     Hypertension Father        Social History     Tobacco Use     Smoking status: Never Smoker     Smokeless tobacco: Never Used   Substance Use Topics     Alcohol use: Yes     Alcohol/week: 0.0 standard drinks     Comment: 1-2/week      Current Outpatient Medications   Medication     acetaminophen (TYLENOL) 325 MG tablet     ALPRAZolam (XANAX) 1 MG tablet     aspirin 81 MG tablet     buPROPion (WELLBUTRIN XL) 150 MG 24 hr tablet     buPROPion (WELLBUTRIN XL) 300 MG 24 hr tablet     ibuprofen (ADVIL) 200 MG tablet     Ibuprofen-Diphenhydramine Cit (IBUPROFEN PM PO)     melatonin 5 MG CAPS     Multiple Vitamin (DAILY MULTIVITAMIN PO)     rosuvastatin (CRESTOR) 20 MG tablet     No current facility-administered medications for this visit.         Allergies   Allergen Reactions     Albumin (Human) Difficulty breathing     Atorvastatin Muscle Pain (Myalgia)     Erythromycin      GI  upset, intolerance     PHYSICAL EXAMINATION:  VS: 174lbs, O2 99%, bp 112/67, temp 98.2  GENERAL Well-appearing woman in no acute distress.  HEENT Normocephalic, atraumatic.  Sclerae anicteric.  CVR  Regular rate and rhythm.  No murmurs, rubs, or gallops.  LUNGS Clear to auscultation bilaterally.  BREASTS Breasts are examined in the supine and upright position.  The breasts are large and symmetric.  The right breast has slight firmness in the upper inner quandrant adjacent to biopsy scar, but is otherwise unremarkable, as there is no erythema, ulceration or suspicious nodularity within the right breast.  Left breast is within normal limits.  There is no erythema, retraction, desquamation or discharge appreciated within the right or left nipple areolar complex.    LYMPH No supraclavicular, infraclavicular, or axillary lymphadenopathy appreciated bilaterally.  ABDOMEN Soft.    EXT  No clubbing or cyanosis or edema.    NEURO No focal deficits.  MSK  Good ROM. No spinal tenderness.  SKIN  Warm and well perfused.    PSYCH  Alert and oriented x 3    ECOG PERFORMANCE STATUS: 0    IMPRESSION/PLAN: Madison Garcia is a 60 year old woman with right UIQ breast ER- G3 DCIS scheduled for lumpectomy on 7/7/2020 being seen preoperatively in consultation. Today we discussed XRT in general terms as she has not had lumpectomy yet. Final recommendation will be determined after final path review. In the event that no additional significant pathologic findings are found during her surgery I would recommend 4 weeks of XRT to the right breast to improve local control.   The rationale is based on multiple RCT i.e. NSABP B17 which shows relative risk reduction of 50% at 12 years. Today in clinic we also discussed the [place]/ECOG Registry Trial which looked at high grade DCIS, 1.0 cm or less w/ >/= 3mm margins and no residual calcifications on postop MMG. Median age for entire population was 60 years and median tumor size was 5mm.   Approximately 30% of these pts received Tamoxifen.  In this study 12 year rate of IBTR 24.6%.  These studies combined provide evidence that the patient would benefit from adjuvant XRT.  The risks, benefits, treatment rationale and regimen of radiation therapy to the right breast were discussed in great detail today with the patient.  Risks include but are not limited to skin erythema, desquamation, hyperpigmentation, lymphedema, fibrosis, telengectasia, pneumonitis, cardiac toxicity and secondary malignancy. The patient consented to therapy and will have a CT simulation completed in our clinic after she is cleared from surgery. We will see if she is a candidate for one of our newer treatment techniques, deep inpiration breath hold or DIBH, which allows us to treat her breast while reducing the amount of radiation to her heart. Heart dose is a concern for her given her h/o MV repair.   Finally we also discussed optimal lifestyle choices in regards to nutrition and exercise.  For nutrition, I recommended a Mediterranean style diet or that which is high in vegetables, fruits, and whole grains. She is a vegetarian so having good ample protein sources is ideal. She should also strive to limit high-calorie foods and drinks.  In regards to exercise, I recommended that she have regular physical activity as this has been shown to help improve physical functioning and fatigue.  The latter is a commonly encountered symptom during radiotherapy.  Ideally exercise would be 150 minutes per week or 30 minutes each radiation treatment day.  Consuming a well-balanced diet and maintaining a physically active lifestyle are important for long-term health and well-being and also helps improve multiple aspects of quality of life.  Additional problem list to be addressed in the following manner:  1. Systemic Treatment in Postmenopausal Woman: Per Med Onc pending surgery  2. Large breast: Will use Mometasone/Aquaphor for skin care during  XRT.  There was ample time for questions and all were answered to the patient's satisfaction. Thank you for allowing me to participate in the care of this pleasant patient. If you have any questions, please do not hesitate to contact my office.    Sincerely,  Cassandra Santillan MD

## 2020-06-23 ENCOUNTER — OFFICE VISIT (OUTPATIENT)
Dept: RADIATION ONCOLOGY | Facility: CLINIC | Age: 60
End: 2020-06-23
Attending: SURGERY
Payer: COMMERCIAL

## 2020-06-23 DIAGNOSIS — C50.211 MALIGNANT NEOPLASM OF UPPER-INNER QUADRANT OF RIGHT BREAST IN FEMALE, ESTROGEN RECEPTOR NEGATIVE (H): Primary | ICD-10-CM

## 2020-06-23 DIAGNOSIS — Z17.1 MALIGNANT NEOPLASM OF UPPER-INNER QUADRANT OF RIGHT BREAST IN FEMALE, ESTROGEN RECEPTOR NEGATIVE (H): Primary | ICD-10-CM

## 2020-06-23 PROCEDURE — 99205 OFFICE O/P NEW HI 60 MIN: CPT | Performed by: RADIOLOGY

## 2020-06-23 NOTE — LETTER
6/23/2020         RE: Madison Garcia  [address] [address] [place] [id number]        Dear Colleague,    Thank you for referring your patient, Madison Garcia, to the [facility]. Please see a copy of my visit note below.    Dear Colleagues,  Today Madison Garcia was seen in consultation.  IDENTIFICATION: This is a 60 year old woman with right UIQ breast ER- G3 DCIS scheduled for lumpectomy on 7/7/2020 being seen preoperatively in consultation.   HISTORY OF PRESENT ILLNESS: Madison Garcia was in her usual state of health a couple of months ago.  As part of her routine care, on On May 26, 2020 she had a screening bilateral mammogram with Abad that showed microcalcifications in the upper inner quadrant of the right breast.  The other breast was unremarkable.  This was followed by diagnostic mammogram completed on May 29, 2020 that showed superior right breast amorphous calcifications.  On June 1, 2020 stereotactic biopsy of the upper inner quadrant right breast lesionsg revealed grade 3 DCIS ER negative.  On June 16, 2020 she had a bilateral breast MRI that showed the biopsy-proven high-grade DCIS measured about 1.4 cm and no other suspicious enhancement in the right or left breast or lymphadenopathy.  She saw Dr. Slater in consultation and is scheduled for a lumpectomy on July 7, 2020.  Currently, she is healing well from her biopsy.  She has good ROM.  She denies any other new masses, skin changes, shortness of breath, chest or bony pain, or new neurologic symptoms. She is being seen here today for consideration of postoperative radiotherapy.  REVIEW OF SYSTEMS: As per HPI, a 14-point review of system is otherwise negative.  PAST RADIATION THERAPY:  Denies.  PAST CTD/PACEMAKER: Denies  BREAST RISK FACTORS: Postmenopausal with menopause at age 52yo. (m) grandmother with breast cancer. No family history of ovarian cancer. She started her menses at age 15.  G1 with miscarriage.     Past  Medical History:   Diagnosis Date     Abdominal pain 2008 and 2010    ct neg 2008, ovar us 2010 with fallopian cyst and fibroids     Anxiety and depression 90's     ASCVD (arteriosclerotic cardiovascular disease) 12/12    pos est, ct angio and cards fu, 25-49% lad     Chest tightness 12/2019    neg est echo     Chronic cystitis     Dr. Mcgrath     Chronic insomnia     for years, seen in sleep clinic 2016     Colonic polyp fu nl 2008    fu due 2013, fu done 2014 and nl, to fu 2019     [place] (dyspnea on exertion)     est echo nl 2015, ct chest and pulm eval by Dr. Quiroga and no cause found     Elevated TSH 11/2017     Hyperlipidaemia      Leg pain 2014    stopped lipitor     Lung nodules 01/2012    seen on coronary calcium ct, fu 1 year, fu done 12/12 and likely benign, fu done 2016 and no change, Dr. Quiroga     MVP (mitral valve prolapse) 2013     Had it surgically repaired, Robotic, done [place] 6/13 seen by cardiology 2011, echo 2007 with mild mr and nl lv fxn, fu echo 12/11 with lv size upper limits of nl, nl ef, no wma, mild lae, mvp and mild mr present     Palpitations      Screening Jan 2012    cor ct score 0       Past Surgical History:   Procedure Laterality Date     HERNIA REPAIR       REPAIR VALVE MITRAL  6/13    done at [place], robotic       Family History   Problem Relation Age of Onset     Hypertension Father        Social History     Tobacco Use     Smoking status: Never Smoker     Smokeless tobacco: Never Used   Substance Use Topics     Alcohol use: Yes     Alcohol/week: 0.0 standard drinks     Comment: 1-2/week      Current Outpatient Medications   Medication     acetaminophen (TYLENOL) 325 MG tablet     ALPRAZolam (XANAX) 1 MG tablet     aspirin 81 MG tablet     buPROPion (WELLBUTRIN XL) 150 MG 24 hr tablet     buPROPion (WELLBUTRIN XL) 300 MG 24 hr tablet     ibuprofen (ADVIL) 200 MG tablet     Ibuprofen-Diphenhydramine Cit (IBUPROFEN PM PO)     melatonin 5 MG CAPS     Multiple Vitamin (DAILY MULTIVITAMIN  PO)     rosuvastatin (CRESTOR) 20 MG tablet     No current facility-administered medications for this visit.         Allergies   Allergen Reactions     Albumin (Human) Difficulty breathing     Atorvastatin Muscle Pain (Myalgia)     Erythromycin      GI upset, intolerance     PHYSICAL EXAMINATION:  VS: 174lbs, O2 99%, bp 112/67, temp 98.2  GENERAL Well-appearing woman in no acute distress.  HEENT Normocephalic, atraumatic.  Sclerae anicteric.  CVR  Regular rate and rhythm.  No murmurs, rubs, or gallops.  LUNGS Clear to auscultation bilaterally.  BREASTS Breasts are examined in the supine and upright position.  The breasts are large and symmetric.  The right breast has slight firmness in the upper inner quandrant adjacent to biopsy scar, but is otherwise unremarkable, as there is no erythema, ulceration or suspicious nodularity within the right breast.  Left breast is within normal limits.  There is no erythema, retraction, desquamation or discharge appreciated within the right or left nipple areolar complex.    LYMPH No supraclavicular, infraclavicular, or axillary lymphadenopathy appreciated bilaterally.  ABDOMEN Soft.    EXT  No clubbing or cyanosis or edema.    NEURO No focal deficits.  MSK  Good ROM. No spinal tenderness.  SKIN  Warm and well perfused.    PSYCH  Alert and oriented x 3    ECOG PERFORMANCE STATUS: 0    IMPRESSION/PLAN: Madison Garcia is a 60 year old woman with right UIQ breast ER- G3 DCIS scheduled for lumpectomy on 7/7/2020 being seen preoperatively in consultation. Today we discussed XRT in general terms as she has not had lumpectomy yet. Final recommendation will be determined after final path review. In the event that no additional significant pathologic findings are found during her surgery I would recommend 4 weeks of XRT to the right breast to improve local control.   The rationale is based on multiple RCT i.e. NSABP B17 which shows relative risk reduction of 50% at 12 years. Today in  clinic we also discussed the [place]/ECOG Registry Trial which looked at high grade DCIS, 1.0 cm or less w/ >/= 3mm margins and no residual calcifications on postop MMG. Median age for entire population was 60 years and median tumor size was 5mm.  Approximately 30% of these pts received Tamoxifen.  In this study 12 year rate of IBTR 24.6%.  These studies combined provide evidence that the patient would benefit from adjuvant XRT.  The risks, benefits, treatment rationale and regimen of radiation therapy to the right breast were discussed in great detail today with the patient.  Risks include but are not limited to skin erythema, desquamation, hyperpigmentation, lymphedema, fibrosis, telengectasia, pneumonitis, cardiac toxicity and secondary malignancy. The patient consented to therapy and will have a CT simulation completed in our clinic after she is cleared from surgery. We will see if she is a candidate for one of our newer treatment techniques, deep inpiration breath hold or DIBH, which allows us to treat her breast while reducing the amount of radiation to her heart. Heart dose is a concern for her given her h/o MV repair.   Finally we also discussed optimal lifestyle choices in regards to nutrition and exercise.  For nutrition, I recommended a Mediterranean style diet or that which is high in vegetables, fruits, and whole grains. She is a vegetarian so having good ample protein sources is ideal. She should also strive to limit high-calorie foods and drinks.  In regards to exercise, I recommended that she have regular physical activity as this has been shown to help improve physical functioning and fatigue.  The latter is a commonly encountered symptom during radiotherapy.  Ideally exercise would be 150 minutes per week or 30 minutes each radiation treatment day.  Consuming a well-balanced diet and maintaining a physically active lifestyle are important for long-term health and well-being and also helps improve  multiple aspects of quality of life.  Additional problem list to be addressed in the following manner:  1. Systemic Treatment in Postmenopausal Woman: Per Med Onc pending surgery  2. Large breast: Will use Mometasone/Aquaphor for skin care during XRT.  There was ample time for questions and all were answered to the patient's satisfaction. Thank you for allowing me to participate in the care of this pleasant patient. If you have any questions, please do not hesitate to contact my office.    Sincerely,  Cassandra Santillan MD            Again, thank you for allowing me to participate in the care of your patient.        Sincerely,        Cassandra Santillan MD

## 2020-06-26 ENCOUNTER — TELEPHONE (OUTPATIENT)
Dept: SURGERY | Facility: CLINIC | Age: 60
End: 2020-06-26

## 2020-06-26 NOTE — TELEPHONE ENCOUNTER
Madison is scheduled for seed localized right breast lumpectomy on 7/8/2020 with Dr. Slater. Patient calling with general pre-op questions.    Madison has a hx of mitral valve prolapse. She does take antibiotics prior to dental procedures. She is wondering if she needs to do the same prior to her lumpectomy. Informed Madison, she does not need to premedicate with ABX prior to right breast lumpectomy. IV antibiotics will be given at time of surgery.    Ok to take ibuprofen as needed prior to surgery for pain relief.    We reviewed wound and dressing cares following right breast lumpectomy.    Madison knows to contact us in the interim with additional questions or concerns.    Carissa Horne RN, BSN, OCN  Oncology Care Coordinator  [facility]  [facility]  Phone: [phone]

## 2020-06-29 ENCOUNTER — OFFICE VISIT (OUTPATIENT)
Dept: FAMILY MEDICINE | Facility: CLINIC | Age: 60
End: 2020-06-29
Payer: COMMERCIAL

## 2020-06-29 VITALS
WEIGHT: 172 LBS | SYSTOLIC BLOOD PRESSURE: 105 MMHG | DIASTOLIC BLOOD PRESSURE: 67 MMHG | HEART RATE: 77 BPM | BODY MASS INDEX: 25.4 KG/M2 | TEMPERATURE: 98.3 F | OXYGEN SATURATION: 97 %

## 2020-06-29 DIAGNOSIS — Z01.818 PREOP GENERAL PHYSICAL EXAM: Primary | ICD-10-CM

## 2020-06-29 DIAGNOSIS — D05.11 DUCTAL CARCINOMA IN SITU (DCIS) OF RIGHT BREAST: ICD-10-CM

## 2020-06-29 LAB
BASOPHILS # BLD AUTO: 0 10E9/L (ref 0–0.2)
BASOPHILS NFR BLD AUTO: 0.5 %
DIFFERENTIAL METHOD BLD: NORMAL
EOSINOPHIL # BLD AUTO: 0.1 10E9/L (ref 0–0.7)
EOSINOPHIL NFR BLD AUTO: 1.6 %
ERYTHROCYTE [DISTWIDTH] IN BLOOD BY AUTOMATED COUNT: 12.1 % (ref 10–15)
HCT VFR BLD AUTO: 36.8 % (ref 35–47)
HGB BLD-MCNC: 12.3 G/DL (ref 11.7–15.7)
LYMPHOCYTES # BLD AUTO: 1.8 10E9/L (ref 0.8–5.3)
LYMPHOCYTES NFR BLD AUTO: 39.6 %
MCH RBC QN AUTO: 29.5 PG (ref 26.5–33)
MCHC RBC AUTO-ENTMCNC: 33.4 G/DL (ref 31.5–36.5)
MCV RBC AUTO: 88 FL (ref 78–100)
MONOCYTES # BLD AUTO: 0.3 10E9/L (ref 0–1.3)
MONOCYTES NFR BLD AUTO: 7.4 %
NEUTROPHILS # BLD AUTO: 2.3 10E9/L (ref 1.6–8.3)
NEUTROPHILS NFR BLD AUTO: 50.9 %
PLATELET # BLD AUTO: 266 10E9/L (ref 150–450)
RBC # BLD AUTO: 4.17 10E12/L (ref 3.8–5.2)
WBC # BLD AUTO: 4.4 10E9/L (ref 4–11)

## 2020-06-29 PROCEDURE — 80048 BASIC METABOLIC PNL TOTAL CA: CPT | Performed by: NURSE PRACTITIONER

## 2020-06-29 PROCEDURE — 85025 COMPLETE CBC W/AUTO DIFF WBC: CPT | Performed by: NURSE PRACTITIONER

## 2020-06-29 PROCEDURE — 99215 OFFICE O/P EST HI 40 MIN: CPT | Performed by: NURSE PRACTITIONER

## 2020-06-29 PROCEDURE — 36415 COLL VENOUS BLD VENIPUNCTURE: CPT | Performed by: NURSE PRACTITIONER

## 2020-06-29 NOTE — PROGRESS NOTES
[address] [address]  [phone]  Dept: [phone]    PRE-OP EVALUATION:  Today's date: 2020    Madison Garcia (: 1960) presents for pre-operative evaluation assessment as requested by Dr. Slater.  She requires evaluation and anesthesia risk assessment prior to undergoing surgery/procedure for treatment of ductal carcinoma in situ of right breast.    Proposed Surgery/ Procedure: SEED Localized right breast lumpectomy  Date of Surgery/ Procedure: 2020  Time of Surgery/ Procedure: 1125  Hospital/Surgical Facility:  OR  Fax number for surgical facility: in [facility]  Primary Physician: Tera Keith  Type of Anesthesia Anticipated: General    Patient has a Health Care Directive or Living Will:  YES   1. NO - Do you have a history of heart attack, stroke, stent, bypass or surgery on an artery in the head, neck, heart or legs?  2. NO - Do you ever have any pain or discomfort in your chest?  3. NO - Do you have a history of  Heart Failure?  4. NO - Are you troubled by shortness of breath when: walking on the level, up a slight hill or at night?  5. NO - Do you currently have a cold, bronchitis or other respiratory infection?  6. NO - Do you have a cough, shortness of breath or wheezing?  7. NO - Do you sometimes get pains in the calves of your legs when you walk?  8. NO - Do you or anyone in your family have previous history of blood clots?  9. NO - Do you or does anyone in your family have a serious bleeding problem such as prolonged bleeding following surgeries or cuts?  10. NO - Have you ever had problems with anemia or been told to take iron pills?  11. NO - Have you had any abnormal blood loss such as black, tarry or bloody stools, or abnormal vaginal bleeding?  12. NO - Have you ever had a blood transfusion?  13. NO - Have you or any of your relatives ever had problems with anesthesia?  14. NO - Do you have sleep apnea, excessive snoring  or daytime drowsiness?  15. NO - Do you have any prosthetic heart valves?  16. NO - Do you have prosthetic joints?  17. NO - Is there any chance that you may be pregnant?      HPI:     HPI related to upcoming procedure: right breast lump discovered on routine mammogram; biopsy DCIS  Planning lumpectomy followed by radiation     See problem list for active medical problems.  Problems all longstanding and stable, except as noted/documented.  See ROS for pertinent symptoms related to these conditions.    DEPRESSION - Patient has a long history of Depression of moderate severity requiring medication for control with recent symptoms being stable.. takes welbutrin and xanax 1mg at HS     HYPERLIPIDEMIA - Patient has a long history of  Hyperlipidemia requiring medication for treatment with recent good control. Patient reports no problems or side effects with the medication.     Benign nodule lateral right lung thought to be granuloma and unchanged since 9/30/16    Mitral valve replacement : requires dental prophylaxis    MEDICAL HISTORY:     Patient Active Problem List    Diagnosis Date Noted     Malignant neoplasm of upper-inner quadrant of right breast in female, estrogen receptor negative (H) 06/23/2020     Priority: Medium     Ductal carcinoma in situ (DCIS) of right breast 06/17/2020     Priority: Medium     Added automatically from request for surgery [id number]       Elevated TSH 11/01/2017     Priority: Medium     Recurrent major depressive disorder, in partial remission (H) 11/04/2016     Priority: Medium     Insomnia, unspecified type 11/04/2016     Priority: Medium     [place] (dyspnea on exertion)      Priority: Medium     est echo nl 2015, ct chest and pulm eval by Dr. Quiroga and no cause found       Anxiety 11/04/2014     Priority: Medium     Patient is followed by ZAYDA ARMENTA for ongoing prescription of benzodiazepines.  All refills should be approved by this provider, or covering  partner.    Medication(s): Alprazolam (Xanax) 1mg.   Maximum quantity per month: #90/90 days  Clinic visit frequency required: Q 6  months     Controlled substance agreement on file: No  Benzodiazepine use reviewed by psychiatry:  No    Last [facility] website verification 2-6-2020  SN    [email or web address]           Chronic cystitis      Priority: Medium     Dr. Mcgrath       Hyperlipidemia LDL goal <100 07/25/2013     Priority: Medium     MVP (mitral valve prolapse)      Priority: Medium     Had it surgically repaired, Robotic, done [place] 6/13seen by cardiology 2011, echo 2007 with mild mr and nl lv fxn, fu echo 12/11 with lv size upper limits of nl, nl ef, no wma, mild lae, mvp and mild mr present       Advanced directives, counseling/discussion 06/03/2013     Priority: Medium     Advance Care Planning:   Followup facilitation and documentation of choices:  Madison Garcia presented for follow-up session regarding ACP at the clinic.  She was accompanied by no one.  Previous ACP discussions reviewed and questions answered. At this time she has completed a HCD reflecting current values, goals, and choices. Health Care Agent understands responsibility of role and choices.  Documents completed at this visit: HCD. Signed and notarized. Validation form completed and sent with copy of document to be scanned. Confirmed/documented designated decision maker(s). See permanent comments of demographics in clinical tab. Confirmed current code status reflects current choices. View document(s) and details by clicking on code status.    Health Care Directive completed, notarized and documented.  Added by Trish Turner on 6/3/2013             ASCVD (arteriosclerotic cardiovascular disease)      Priority: Medium     pos est, ct angio and cards fu, 25-49% lad       Lung nodules 01/01/2012     Priority: Medium     seen on coronary calcium ct, fu 1 year       Colonic polyp      Priority: Medium      Past Medical History:    Diagnosis Date     Abdominal pain 2008 and 2010    ct neg 2008, ovar us 2010 with fallopian cyst and fibroids     Anxiety and depression 90's     ASCVD (arteriosclerotic cardiovascular disease) 12/12    pos est, ct angio and cards fu, 25-49% lad     Chest tightness 12/2019    neg est echo     Chronic cystitis     Dr. Mcgrath     Chronic insomnia     for years, seen in sleep clinic 2016     Colonic polyp fu nl 2008    fu due 2013, fu done 2014 and nl, to fu 2019     [place] (dyspnea on exertion)     est echo nl 2015, ct chest and pulm eval by Dr. Quiroga and no cause found     Elevated TSH 11/2017     Hyperlipidaemia      Leg pain 2014    stopped lipitor     Lung nodules 01/2012    seen on coronary calcium ct, fu 1 year, fu done 12/12 and likely benign, fu done 2016 and no change, Dr. Quiroga     MVP (mitral valve prolapse) 2013     Had it surgically repaired, Robotic, done [place] 6/13 seen by cardiology 2011, echo 2007 with mild mr and nl lv fxn, fu echo 12/11 with lv size upper limits of nl, nl ef, no wma, mild lae, mvp and mild mr present     Palpitations      Screening Jan 2012    cor ct score 0     Past Surgical History:   Procedure Laterality Date     HERNIA REPAIR       REPAIR VALVE MITRAL  6/13    done at [place], robotic     Current Outpatient Medications   Medication Sig Dispense Refill     acetaminophen (TYLENOL) 325 MG tablet Take 325-650 mg by mouth every 6 hours as needed.       ALPRAZolam (XANAX) 1 MG tablet TAKE 1 TABLET BY MOUTH EVERY DAY AT BEDTIME AS NEEDED FOR ANXIETY 90 tablet 0     aspirin 81 MG tablet Take 1 tablet by mouth daily. 1 tablet 3     buPROPion (WELLBUTRIN XL) 150 MG 24 hr tablet Take 1 of the 150mg along with one of the 300mg tablets for total daily dose of 450mg 90 tablet 3     buPROPion (WELLBUTRIN XL) 300 MG 24 hr tablet TAKE 1 TABLET BY MOUTH EVERY DAY ALONG WITH THE 150MG TABLET 90 tablet 3     diphenhydrAMINE-APAP, sleep, (TYLENOL PM EXTRA STRENGTH PO)        ibuprofen (ADVIL) 200 MG  tablet Take 400 mg by mouth every 4 hours as needed       Ibuprofen-diphenhydrAMINE Cit (ADVIL PM PO)        Ibuprofen-Diphenhydramine Cit (IBUPROFEN PM PO)        melatonin 5 MG CAPS Take 6 mg by mouth At Bedtime        Multiple Vitamin (DAILY MULTIVITAMIN PO) Take 1 tablet by mouth daily.       rosuvastatin (CRESTOR) 20 MG tablet Take 1 tablet (20 mg) by mouth daily 90 tablet 3     OTC products: None, except as noted above    Allergies   Allergen Reactions     Albumin (Human) Difficulty breathing     Atorvastatin Muscle Pain (Myalgia)     Erythromycin      GI upset, intolerance      Latex Allergy: NO    Social History     Tobacco Use     Smoking status: Never Smoker     Smokeless tobacco: Never Used   Substance Use Topics     Alcohol use: Yes     Alcohol/week: 0.0 standard drinks     Comment: 1-2/week      History   Drug Use No       REVIEW OF SYSTEMS:   CONSTITUTIONAL: NEGATIVE for fever, chills, change in weight  INTEGUMENTARY/SKIN: NEGATIVE for worrisome rashes, moles or lesions  EYES: NEGATIVE for vision changes or irritation  ENT/MOUTH: NEGATIVE for ear, mouth and throat problems  RESP: NEGATIVE for significant cough or SOB  BREAST: NEGATIVE for masses, tenderness or discharge  CV: NEGATIVE for chest pain, palpitations or peripheral edema  GI: NEGATIVE for nausea, abdominal pain, heartburn, or change in bowel habits  : NEGATIVE for frequency, dysuria, or hematuria  MUSCULOSKELETAL: NEGATIVE for significant arthralgias or myalgia  NEURO: NEGATIVE for weakness, dizziness or paresthesias  ENDOCRINE: NEGATIVE for temperature intolerance, skin/hair changes  HEME: NEGATIVE for bleeding problems  PSYCHIATRIC: NEGATIVE for changes in mood or affect    EXAM:   /67 (BP Location: Right arm, Patient Position: Sitting, Cuff Size: Adult Regular)   Pulse 77   Temp 98.3  F (36.8  C)   Wt 78 kg (172 lb)   SpO2 97%   BMI 25.40 kg/m      GENERAL APPEARANCE: healthy, alert and no distress     EYES: EOMI, PERRL      HENT: ear canals and TM's normal and nose and mouth without ulcers or lesions     NECK: no adenopathy, no asymmetry, masses, or scars and thyroid normal to palpation     RESP: lungs clear to auscultation - no rales, rhonchi or wheezes     CV: regular rates and rhythm, normal S1 S2, no S3 or S4 and no murmur, click or rub     ABDOMEN:  soft, nontender, no HSM or masses and bowel sounds normal     MS: extremities normal- no gross deformities noted, no evidence of inflammation in joints, FROM in all extremities.     SKIN: no suspicious lesions or rashes     NEURO: Normal strength and tone, sensory exam grossly normal, mentation intact and speech normal     PSYCH: mentation appears normal. and affect flat     LYMPHATICS: No cervical adenopathy    DIAGNOSTICS:   12/2019   Stress echocardiogram:   Interpretation Summary  This was a normal stress echocardiogram with no evidence of stress-induced  ischemia. Repaired mitral valve gradients are 4mmHg (previously 7mmHg).  Overall no significant changes compared to previous stress echo 2017.  _____________________________________________________________________________      Recent Labs   Lab Test 12/02/19  0937 11/08/18  0832  07/15/13 07/08/13   HGB 12.9 12.7   < >  --   --     285   < >  --   --    INR  --   --   --  2.4* 1.8*    142   < >  --   --    POTASSIUM 4.3 4.0   < >  --   --    CR 0.96 0.88   < >  --   --     < > = values in this interval not displayed.      Results for orders placed or performed in visit on 06/29/20   CBC with platelets and differential     Status: None   Result Value Ref Range    WBC 4.4 4.0 - 11.0 10e9/L    RBC Count 4.17 3.8 - 5.2 10e12/L    Hemoglobin 12.3 11.7 - 15.7 g/dL    Hematocrit 36.8 35.0 - 47.0 %    MCV 88 78 - 100 fl    MCH 29.5 26.5 - 33.0 pg    MCHC 33.4 31.5 - 36.5 g/dL    RDW 12.1 10.0 - 15.0 %    Platelet Count 266 150 - 450 10e9/L    % Neutrophils 50.9 %    % Lymphocytes 39.6 %    % Monocytes 7.4 %    % Eosinophils  1.6 %    % Basophils 0.5 %    Absolute Neutrophil 2.3 1.6 - 8.3 10e9/L    Absolute Lymphocytes 1.8 0.8 - 5.3 10e9/L    Absolute Monocytes 0.3 0.0 - 1.3 10e9/L    Absolute Eosinophils 0.1 0.0 - 0.7 10e9/L    Absolute Basophils 0.0 0.0 - 0.2 10e9/L    Diff Method Automated Method    Basic metabolic panel  (Ca, Cl, CO2, Creat, Gluc, K, Na, BUN)     Status: None   Result Value Ref Range    Sodium 138 133 - 144 mmol/L    Potassium 4.4 3.4 - 5.3 mmol/L    Chloride 106 94 - 109 mmol/L    Carbon Dioxide 27 20 - 32 mmol/L    Anion Gap 5 3 - 14 mmol/L    Glucose 93 70 - 99 mg/dL    Urea Nitrogen 12 7 - 30 mg/dL    Creatinine 0.85 0.52 - 1.04 mg/dL    GFR Estimate 76 >60 mL/min/[1.73_m2]    GFR Estimate If Black 88 >60 mL/min/[1.73_m2]    Calcium 9.2 8.5 - 10.1 mg/dL     IMPRESSION:   Reason for surgery/procedure: DCIS of right breast lumpectomy  Diagnosis/reason for consult: pre op consult    The proposed surgical procedure is considered LOW risk.    REVISED CARDIAC RISK INDEX  The patient has the following serious cardiovascular risks for perioperative complications such as (MI, PE, VFib and 3  AV Block):  Coronary Artery Disease (MI, positive stress test, angina, Qs on EKG)  INTERPRETATION: 0 risks: Class I (very low risk - 0.4% complication rate)    The patient has the following additional risks for perioperative complications:  No identified additional risks      ICD-10-CM    1. Preop general physical exam  Z01.818 CBC with platelets and differential     Basic metabolic panel  (Ca, Cl, CO2, Creat, Gluc, K, Na, BUN)   2. Ductal carcinoma in situ (DCIS) of right breast  D05.11        RECOMMENDATIONS:     --Patient is to take all scheduled medications on the day of surgery EXCEPT for modifications listed below.    Anticoagulant or Antiplatelet Medication Use  ASPIRIN: Discontinue ASA 7-10 days prior to procedure to reduce bleeding risk.  It should be resumed post-operatively.  NSAIDS: will discontinue until post op         APPROVAL GIVEN to proceed with proposed procedure, without further diagnostic evaluation       Signed Electronically by: ITALO Nguyen CNP    Copy of this evaluation report is provided to requesting physician.    [facility] Preop Guidelines    Revised Cardiac Risk Index

## 2020-06-30 LAB
ANION GAP SERPL CALCULATED.3IONS-SCNC: 5 MMOL/L (ref 3–14)
BUN SERPL-MCNC: 12 MG/DL (ref 7–30)
CALCIUM SERPL-MCNC: 9.2 MG/DL (ref 8.5–10.1)
CHLORIDE SERPL-SCNC: 106 MMOL/L (ref 94–109)
CO2 SERPL-SCNC: 27 MMOL/L (ref 20–32)
CREAT SERPL-MCNC: 0.85 MG/DL (ref 0.52–1.04)
GFR SERPL CREATININE-BSD FRML MDRD: 76 ML/MIN/{1.73_M2}
GLUCOSE SERPL-MCNC: 93 MG/DL (ref 70–99)
POTASSIUM SERPL-SCNC: 4.4 MMOL/L (ref 3.4–5.3)
SODIUM SERPL-SCNC: 138 MMOL/L (ref 133–144)

## 2020-07-06 ENCOUNTER — PATIENT OUTREACH (OUTPATIENT)
Dept: RADIATION ONCOLOGY | Facility: CLINIC | Age: 60
End: 2020-07-06

## 2020-07-06 DIAGNOSIS — Z11.59 ENCOUNTER FOR SCREENING FOR OTHER VIRAL DISEASES: ICD-10-CM

## 2020-07-06 LAB
SARS-COV-2 RNA SPEC QL NAA+PROBE: NOT DETECTED
SPECIMEN SOURCE: NORMAL

## 2020-07-06 PROCEDURE — 99207 ZZC NO BILLABLE SERVICE THIS VISIT: CPT

## 2020-07-06 PROCEDURE — U0003 INFECTIOUS AGENT DETECTION BY NUCLEIC ACID (DNA OR RNA); SEVERE ACUTE RESPIRATORY SYNDROME CORONAVIRUS 2 (SARS-COV-2) (CORONAVIRUS DISEASE [COVID-19]), AMPLIFIED PROBE TECHNIQUE, MAKING USE OF HIGH THROUGHPUT TECHNOLOGIES AS DESCRIBED BY CMS-2020-01-R: HCPCS | Performed by: SURGERY

## 2020-07-06 NOTE — TELEPHONE ENCOUNTER
Patient was seen by Dr. Santillan for pre-surgical radiation oncology consultation on 6/23/2020, plans to proceed with lumpectomy and patient signed radiation therapy consent form during clinic visit.  Patient is scheduled for right breast lumpectomy with Dr. Slater on Wednesday, 7/8/2020.  This RN contacted patient to assist in scheduling CT simulation appointment for radiation treatment planning, patient requesting to be seen by surgeon for surgical follow-up prior to radiation treatment planning.  CT simulation scheduled on Monday, 7/27/2020 at 1330 at [facility] with Dr. Santillan.  Patient verbalized understanding of all information and questions regarding treatment start date and treatment schedule were answered, patient informed that start date and schedule would be communicated during CT simulation.      Dr. Santillan updated via Epic in-basket.    Rosie Sims, RN BSN OCN

## 2020-07-07 ENCOUNTER — TELEPHONE (OUTPATIENT)
Dept: SURGERY | Facility: CLINIC | Age: 60
End: 2020-07-07

## 2020-07-07 RX ORDER — AMOXICILLIN 500 MG/1
2000 TABLET, FILM COATED ORAL DAILY PRN
COMMUNITY

## 2020-07-07 NOTE — TELEPHONE ENCOUNTER
Madison is scheduled for seed localized right breast lumpectomy on 7/8/2020 with Dr. Slater. Pre procedure call placed to patient. All Madison's questions answered thoroughly and to her satisfaction.     Reviewed the following with Madison:   Surgical patients can have one visitor only during the preoperative phase  Please wear a mask to your appointment.   Please arrive 15 minutes prior to your appointment.  If you arrive earlier than 15 minutes, please don't enter the clinic until your check in time.  This is to limit the number of people in our department at any given time, keeping our patients and staff safe.    If you have a thermometer at home, take your temperature before leaving for your appointment.  If your temperature is elevated, please call to reschedule your appointment.      Component  1d ago Resulting Agency    COVID-19 Virus PCR to [facility] - Source  Nasopharyngeal   BK    COVID-19 Virus PCR to [facility] - Result  Not Detected   [facility]      Madison knows to contact us in the interim with additional questions or concerns. She will proceed with surgery as scheduled.     Carissa Horne RN, BSN, OCN  Oncology Care Coordinator  [facility]  [facility]  Phone: [phone]

## 2020-07-08 ENCOUNTER — HOSPITAL ENCOUNTER (OUTPATIENT)
Dept: MAMMOGRAPHY | Facility: CLINIC | Age: 60
End: 2020-07-08
Attending: SURGERY
Payer: COMMERCIAL

## 2020-07-08 ENCOUNTER — HOSPITAL ENCOUNTER (OUTPATIENT)
Facility: CLINIC | Age: 60
Discharge: HOME | End: 2020-07-08
Attending: SURGERY | Admitting: SURGERY
Payer: COMMERCIAL

## 2020-07-08 ENCOUNTER — ANESTHESIA EVENT (OUTPATIENT)
Dept: SURGERY | Facility: CLINIC | Age: 60
End: 2020-07-08
Payer: COMMERCIAL

## 2020-07-08 ENCOUNTER — APPOINTMENT (OUTPATIENT)
Dept: SURGERY | Facility: PHYSICIAN GROUP | Age: 60
End: 2020-07-08
Payer: COMMERCIAL

## 2020-07-08 ENCOUNTER — ANESTHESIA (OUTPATIENT)
Dept: SURGERY | Facility: CLINIC | Age: 60
End: 2020-07-08
Payer: COMMERCIAL

## 2020-07-08 VITALS
DIASTOLIC BLOOD PRESSURE: 72 MMHG | HEIGHT: 69 IN | SYSTOLIC BLOOD PRESSURE: 118 MMHG | BODY MASS INDEX: 26.05 KG/M2 | HEART RATE: 75 BPM | OXYGEN SATURATION: 98 % | WEIGHT: 175.9 LBS | TEMPERATURE: 97.2 F | RESPIRATION RATE: 16 BRPM

## 2020-07-08 DIAGNOSIS — D05.11 DUCTAL CARCINOMA IN SITU (DCIS) OF RIGHT BREAST: ICD-10-CM

## 2020-07-08 DIAGNOSIS — G89.18 POSTOPERATIVE PAIN: Primary | ICD-10-CM

## 2020-07-08 PROCEDURE — 25000125 ZZHC RX 250: Performed by: SURGERY

## 2020-07-08 PROCEDURE — A4641 RADIOPHARM DX AGENT NOC: HCPCS

## 2020-07-08 PROCEDURE — 19301 PARTIAL MASTECTOMY: CPT | Mod: RT | Performed by: SURGERY

## 2020-07-08 PROCEDURE — 71000027 ZZH RECOVERY PHASE 2 EACH 15 MINS: Performed by: SURGERY

## 2020-07-08 PROCEDURE — 71000012 ZZH RECOVERY PHASE 1 LEVEL 1 FIRST HR: Performed by: SURGERY

## 2020-07-08 PROCEDURE — 37000009 ZZH ANESTHESIA TECHNICAL FEE, EACH ADDTL 15 MIN: Performed by: SURGERY

## 2020-07-08 PROCEDURE — 37000008 ZZH ANESTHESIA TECHNICAL FEE, 1ST 30 MIN: Performed by: SURGERY

## 2020-07-08 PROCEDURE — 40000268 MA BREAST SPECIMEN RIGHT OR

## 2020-07-08 PROCEDURE — 25000128 H RX IP 250 OP 636: Performed by: SURGERY

## 2020-07-08 PROCEDURE — 88307 TISSUE EXAM BY PATHOLOGIST: CPT | Performed by: SURGERY

## 2020-07-08 PROCEDURE — 36000052 ZZH SURGERY LEVEL 2 EA 15 ADDTL MIN: Performed by: SURGERY

## 2020-07-08 PROCEDURE — 36000054 ZZH SURGERY LEVEL 2 W FLUORO 1ST 30 MIN: Performed by: SURGERY

## 2020-07-08 PROCEDURE — 71000013 ZZH RECOVERY PHASE 1 LEVEL 1 EA ADDTL HR: Performed by: SURGERY

## 2020-07-08 PROCEDURE — 25800030 ZZH RX IP 258 OP 636: Performed by: NURSE ANESTHETIST, CERTIFIED REGISTERED

## 2020-07-08 PROCEDURE — 25000128 H RX IP 250 OP 636: Performed by: ANESTHESIOLOGY

## 2020-07-08 PROCEDURE — 25000128 H RX IP 250 OP 636: Performed by: NURSE ANESTHETIST, CERTIFIED REGISTERED

## 2020-07-08 PROCEDURE — 25000566 ZZH SEVOFLURANE, EA 15 MIN: Performed by: SURGERY

## 2020-07-08 PROCEDURE — 88307 TISSUE EXAM BY PATHOLOGIST: CPT | Mod: 26 | Performed by: SURGERY

## 2020-07-08 PROCEDURE — 25000132 ZZH RX MED GY IP 250 OP 250 PS 637: Performed by: PHYSICIAN ASSISTANT

## 2020-07-08 PROCEDURE — 88360 TUMOR IMMUNOHISTOCHEM/MANUAL: CPT | Mod: 26 | Performed by: SURGERY

## 2020-07-08 PROCEDURE — 88360 TUMOR IMMUNOHISTOCHEM/MANUAL: CPT | Performed by: SURGERY

## 2020-07-08 PROCEDURE — 27210794 ZZH OR GENERAL SUPPLY STERILE: Performed by: SURGERY

## 2020-07-08 PROCEDURE — 40000170 ZZH STATISTIC PRE-PROCEDURE ASSESSMENT II: Performed by: SURGERY

## 2020-07-08 PROCEDURE — 40000986 MA POST PROCEDURE RIGHT

## 2020-07-08 PROCEDURE — 25000125 ZZHC RX 250: Performed by: NURSE ANESTHETIST, CERTIFIED REGISTERED

## 2020-07-08 RX ORDER — ONDANSETRON 2 MG/ML
INJECTION INTRAMUSCULAR; INTRAVENOUS PRN
Status: DISCONTINUED | OUTPATIENT
Start: 2020-07-08 | End: 2020-07-08

## 2020-07-08 RX ORDER — CEFAZOLIN SODIUM 2 G/100ML
2 INJECTION, SOLUTION INTRAVENOUS
Status: COMPLETED | OUTPATIENT
Start: 2020-07-08 | End: 2020-07-08

## 2020-07-08 RX ORDER — FENTANYL CITRATE 0.05 MG/ML
25-50 INJECTION, SOLUTION INTRAMUSCULAR; INTRAVENOUS
Status: DISCONTINUED | OUTPATIENT
Start: 2020-07-08 | End: 2020-07-08 | Stop reason: HOSPADM

## 2020-07-08 RX ORDER — ONDANSETRON 2 MG/ML
4 INJECTION INTRAMUSCULAR; INTRAVENOUS EVERY 30 MIN PRN
Status: DISCONTINUED | OUTPATIENT
Start: 2020-07-08 | End: 2020-07-08 | Stop reason: HOSPADM

## 2020-07-08 RX ORDER — HYDROMORPHONE HYDROCHLORIDE 1 MG/ML
.3-.5 INJECTION, SOLUTION INTRAMUSCULAR; INTRAVENOUS; SUBCUTANEOUS EVERY 10 MIN PRN
Status: DISCONTINUED | OUTPATIENT
Start: 2020-07-08 | End: 2020-07-08 | Stop reason: HOSPADM

## 2020-07-08 RX ORDER — NALOXONE HYDROCHLORIDE 0.4 MG/ML
.1-.4 INJECTION, SOLUTION INTRAMUSCULAR; INTRAVENOUS; SUBCUTANEOUS
Status: DISCONTINUED | OUTPATIENT
Start: 2020-07-08 | End: 2020-07-08 | Stop reason: HOSPADM

## 2020-07-08 RX ORDER — MEPERIDINE HYDROCHLORIDE 25 MG/ML
12.5 INJECTION INTRAMUSCULAR; INTRAVENOUS; SUBCUTANEOUS
Status: DISCONTINUED | OUTPATIENT
Start: 2020-07-08 | End: 2020-07-08 | Stop reason: HOSPADM

## 2020-07-08 RX ORDER — LIDOCAINE HYDROCHLORIDE 20 MG/ML
INJECTION, SOLUTION INFILTRATION; PERINEURAL PRN
Status: DISCONTINUED | OUTPATIENT
Start: 2020-07-08 | End: 2020-07-08

## 2020-07-08 RX ORDER — SENNOSIDES A AND B 8.6 MG/1
1 TABLET, FILM COATED ORAL 2 TIMES DAILY
Qty: 20 TABLET | Refills: 1 | Status: SHIPPED | OUTPATIENT
Start: 2020-07-08 | End: 2020-07-23

## 2020-07-08 RX ORDER — HYDROCODONE BITARTRATE AND ACETAMINOPHEN 5; 325 MG/1; MG/1
1 TABLET ORAL EVERY 4 HOURS PRN
Qty: 12 TABLET | Refills: 0 | Status: SHIPPED | OUTPATIENT
Start: 2020-07-08 | End: 2020-07-23

## 2020-07-08 RX ORDER — BUPIVACAINE HYDROCHLORIDE 2.5 MG/ML
INJECTION, SOLUTION EPIDURAL; INFILTRATION; INTRACAUDAL
Status: DISCONTINUED
Start: 2020-07-08 | End: 2020-07-08 | Stop reason: HOSPADM

## 2020-07-08 RX ORDER — SODIUM CHLORIDE, SODIUM LACTATE, POTASSIUM CHLORIDE, CALCIUM CHLORIDE 600; 310; 30; 20 MG/100ML; MG/100ML; MG/100ML; MG/100ML
INJECTION, SOLUTION INTRAVENOUS CONTINUOUS PRN
Status: DISCONTINUED | OUTPATIENT
Start: 2020-07-08 | End: 2020-07-08

## 2020-07-08 RX ORDER — PROPOFOL 10 MG/ML
INJECTION, EMULSION INTRAVENOUS PRN
Status: DISCONTINUED | OUTPATIENT
Start: 2020-07-08 | End: 2020-07-08

## 2020-07-08 RX ORDER — SODIUM CHLORIDE, SODIUM LACTATE, POTASSIUM CHLORIDE, CALCIUM CHLORIDE 600; 310; 30; 20 MG/100ML; MG/100ML; MG/100ML; MG/100ML
INJECTION, SOLUTION INTRAVENOUS CONTINUOUS
Status: DISCONTINUED | OUTPATIENT
Start: 2020-07-08 | End: 2020-07-08 | Stop reason: HOSPADM

## 2020-07-08 RX ORDER — DEXAMETHASONE SODIUM PHOSPHATE 4 MG/ML
INJECTION, SOLUTION INTRA-ARTICULAR; INTRALESIONAL; INTRAMUSCULAR; INTRAVENOUS; SOFT TISSUE PRN
Status: DISCONTINUED | OUTPATIENT
Start: 2020-07-08 | End: 2020-07-08

## 2020-07-08 RX ORDER — HYDROCODONE BITARTRATE AND ACETAMINOPHEN 5; 325 MG/1; MG/1
1 TABLET ORAL
Status: COMPLETED | OUTPATIENT
Start: 2020-07-08 | End: 2020-07-08

## 2020-07-08 RX ORDER — CEFAZOLIN SODIUM 1 G/3ML
1 INJECTION, POWDER, FOR SOLUTION INTRAMUSCULAR; INTRAVENOUS SEE ADMIN INSTRUCTIONS
Status: DISCONTINUED | OUTPATIENT
Start: 2020-07-08 | End: 2020-07-08 | Stop reason: HOSPADM

## 2020-07-08 RX ORDER — PROPOFOL 10 MG/ML
INJECTION, EMULSION INTRAVENOUS CONTINUOUS PRN
Status: DISCONTINUED | OUTPATIENT
Start: 2020-07-08 | End: 2020-07-08

## 2020-07-08 RX ORDER — LIDOCAINE HYDROCHLORIDE 10 MG/ML
INJECTION, SOLUTION EPIDURAL; INFILTRATION; INTRACAUDAL; PERINEURAL
Status: DISCONTINUED
Start: 2020-07-08 | End: 2020-07-08 | Stop reason: HOSPADM

## 2020-07-08 RX ORDER — ONDANSETRON 4 MG/1
4 TABLET, ORALLY DISINTEGRATING ORAL EVERY 30 MIN PRN
Status: DISCONTINUED | OUTPATIENT
Start: 2020-07-08 | End: 2020-07-08 | Stop reason: HOSPADM

## 2020-07-08 RX ORDER — FENTANYL CITRATE 50 UG/ML
INJECTION, SOLUTION INTRAMUSCULAR; INTRAVENOUS PRN
Status: DISCONTINUED | OUTPATIENT
Start: 2020-07-08 | End: 2020-07-08

## 2020-07-08 RX ORDER — MAGNESIUM HYDROXIDE 1200 MG/15ML
LIQUID ORAL PRN
Status: DISCONTINUED | OUTPATIENT
Start: 2020-07-08 | End: 2020-07-08 | Stop reason: HOSPADM

## 2020-07-08 RX ADMIN — MIDAZOLAM 2 MG: 1 INJECTION INTRAMUSCULAR; INTRAVENOUS at 11:28

## 2020-07-08 RX ADMIN — PROPOFOL 200 MG: 10 INJECTION, EMULSION INTRAVENOUS at 11:32

## 2020-07-08 RX ADMIN — FENTANYL CITRATE 50 MCG: 50 INJECTION, SOLUTION INTRAMUSCULAR; INTRAVENOUS at 11:32

## 2020-07-08 RX ADMIN — FENTANYL CITRATE 50 MCG: 0.05 INJECTION, SOLUTION INTRAMUSCULAR; INTRAVENOUS at 12:51

## 2020-07-08 RX ADMIN — LIDOCAINE HYDROCHLORIDE 80 MG: 20 INJECTION, SOLUTION INFILTRATION; PERINEURAL at 11:32

## 2020-07-08 RX ADMIN — CEFAZOLIN SODIUM 2 G: 2 INJECTION, SOLUTION INTRAVENOUS at 11:39

## 2020-07-08 RX ADMIN — HYDROCODONE BITARTRATE AND ACETAMINOPHEN 1 TABLET: 5; 325 TABLET ORAL at 13:52

## 2020-07-08 RX ADMIN — ONDANSETRON 4 MG: 2 INJECTION INTRAMUSCULAR; INTRAVENOUS at 11:57

## 2020-07-08 RX ADMIN — PROPOFOL 150 MCG/KG/MIN: 10 INJECTION, EMULSION INTRAVENOUS at 11:32

## 2020-07-08 RX ADMIN — FENTANYL CITRATE 50 MCG: 0.05 INJECTION, SOLUTION INTRAMUSCULAR; INTRAVENOUS at 13:04

## 2020-07-08 RX ADMIN — LIDOCAINE HYDROCHLORIDE 5 ML: 10 INJECTION, SOLUTION INFILTRATION; PERINEURAL at 09:12

## 2020-07-08 RX ADMIN — SODIUM CHLORIDE, POTASSIUM CHLORIDE, SODIUM LACTATE AND CALCIUM CHLORIDE: 600; 310; 30; 20 INJECTION, SOLUTION INTRAVENOUS at 11:25

## 2020-07-08 RX ADMIN — DEXAMETHASONE SODIUM PHOSPHATE 4 MG: 4 INJECTION, SOLUTION INTRA-ARTICULAR; INTRALESIONAL; INTRAMUSCULAR; INTRAVENOUS; SOFT TISSUE at 11:36

## 2020-07-08 ASSESSMENT — MIFFLIN-ST. JEOR: SCORE: 1434.32

## 2020-07-08 ASSESSMENT — COPD QUESTIONNAIRES: COPD: 0

## 2020-07-08 NOTE — BRIEF OP NOTE
[facility]    Brief Operative Note    Pre-operative diagnosis: Ductal carcinoma in situ (DCIS) of right breast [D05.11]  Post-operative diagnosis Same as pre-operative diagnosis    Procedure: Procedure(s):  SEED LOCALIZED RIGHT BREAST LUMPECTOMY  Surgeon: Surgeon(s) and Role:     * Elise Slater MD - Primary     * Esthela Hawley PA-C - Assisting  Anesthesia: General   Estimated blood loss: 5 ml    Specimens:   ID Type Source Tests Collected by Time Destination   A : SEED LOCALIZED RIGHT BREAST LUMPECTOMY Tissue Breast, Right SURGICAL PATHOLOGY EXAM Elise Slater MD 7/8/2020 11:59 AM      Findings:   Surgical specimen with clip and seed in place.  Complications: None.

## 2020-07-08 NOTE — ANESTHESIA POSTPROCEDURE EVALUATION
Patient: Madison Garcia    Procedure(s):  SEED LOCALIZED RIGHT BREAST LUMPECTOMY    Diagnosis:Ductal carcinoma in situ (DCIS) of right breast [D05.11]  Diagnosis Additional Information: No value filed.    Anesthesia Type:  General    Note:  Anesthesia Post Evaluation    Patient location during evaluation: PACU  Patient participation: Able to fully participate in evaluation  Level of consciousness: awake, awake and alert and responsive to verbal stimuli  Pain management: adequate  Airway patency: patent  Cardiovascular status: acceptable  Respiratory status: acceptable  Hydration status: acceptable  PONV: none     Anesthetic complications: None          Last vitals:  Vitals:    07/08/20 1330 07/08/20 1345 07/08/20 1400   BP: 121/75 137/83    Pulse: 67 75    Resp: 20 22 11   Temp:      SpO2: 100% 99% 99%         Electronically Signed By: Mayra Kahn  July 8, 2020  2:48 PM

## 2020-07-08 NOTE — PROGRESS NOTES
SBAR Seed Localization    SITUATION:  Patient to breast imaging center for imaging guided seed localizations before breast lumpectomy or excision biopsy without sentinel node injection.    BACKGROUND:  Breast imaging cancer, breast abnormality  Ordered procedure completed: Yes  Special needs identified: No     ASSESSMENT:  SBAR report called to patient care unit because of unexpected event in radiology: No  Allergies and medication list reviewed prior to procedure. Yes  Skin cleansed with ChloraPrep One-Step.  Anesthesia: approximately 5ml of 1% Lidocaine injection subcutaneous before seed insertion administered by the radiologist.   Gauze dressing over insertion site(s).  Post procedure mammogram completed: Yes    Patient tolerance:well    RECOMMENDATIONS:  Patient transferred to Same Day Surgery in stable condition via wheelchair with Breast Imaging Staff.    Please call [facility] [phone] if there are any questions.

## 2020-07-08 NOTE — ANESTHESIA CARE TRANSFER NOTE
Patient: Madison Garcia    Procedure(s):  SEED LOCALIZED RIGHT BREAST LUMPECTOMY    Diagnosis: Ductal carcinoma in situ (DCIS) of right breast [D05.11]  Diagnosis Additional Information: No value filed.    Anesthesia Type:   General     Note:  Airway :Face Mask  Patient transferred to:PACU  Comments: Pt exhibits spontaneous respirations, follows commands, suctioned, LMA removed, exchanging well, transferred to pacu with O2 @ 10L via mask, all monitors and alarms on, report to RN, VSS.Handoff Report: Identifed the Patient, Identified the Reponsible Provider, Reviewed the pertinent medical history, Discussed the surgical course, Reviewed Intra-OP anesthesia mangement and issues during anesthesia, Set expectations for post-procedure period and Allowed opportunity for questions and acknowledgement of understanding      Vitals: (Last set prior to Anesthesia Care Transfer)    CRNA VITALS  7/8/2020 1146 - 7/8/2020 1223      7/8/2020             SpO2:  100 %    Resp Rate (set):  10    EKG:  NSR                Electronically Signed By: ITALO Calzada CRNA  July 8, 2020  12:23 PM

## 2020-07-08 NOTE — OP NOTE
[facility] Breast Surgery Operative Note      Pre-operative diagnosis: Right breast ductal carcinoma in situ   Post-operative diagnosis: Right breast ductal carcinoma in situ     Procedure: 1.  RIGHT SEED LOCALIZED PARTIAL MASTECTOMY     Surgeon: Elise Slater MD   Assistant(s):  Esthela Hawley PA-C  The PA s assistance was medically necessary to provide adequate exposure in the operating field, maintain hemostasis, cutting suture, clamping and ligating bleeding vessels, and visualization of anatomic structures throughout the surgical procedure.      Anesthesia: General    Estimated blood loss:   5 cc     Specimens: ID Type Source Tests Collected by Time Destination   A : SEED LOCALIZED RIGHT BREAST LUMPECTOMY Tissue Breast, Right SURGICAL PATHOLOGY EXAM Elise Slater MD 7/8/2020 11:59 AM         INDICATION:  Madison is a 60yof who presented with newly diagnosed right breast ductal carcinoma in situ. She had a screening mammogram on 5/26/2020 which revealed micro calcifications in the upper inner quadrant, posterior depth. She then had diagnostic imaging which revealed a 2cm group of amorphous calcifications at 1:00, middle depth. 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   DESCRIPTION OF PROCEDURE: The patient was placed on the table in supine position. General anesthetic was induced. Perioperative antibiotics were given. The right breast and axilla were prepped and draped in standard sterile fashion.  We used the seed placed in the [facility] as well as the post-seed mammograms to localize the area of interest. We made a curvilinear incision centered at the 1 o'clock position. We created skin flaps circumferentially dissecting with cautery along the anterior mammary fascial plane. We then carried the incision down using electrocautery into the breast tissue and excised the area of interest, including the seed.  The neoprobe was used to  guide the dissection. The mass was removed in its entirety with some surrounding benign appearing breast tissue. The posterior margin was including the pectoral fascia. After the specimen was removed it had a high signal with the neoprobe. Once the mass was removed, it was oriented with Roper dyes. A specimen mammogram was obtained and revealed the clip and seed in the center of the specimen. The specimen was then sent to pathology for review.  The wound was then examined for bleeding and hemostasis was achieved using electrocautery.  Clips were placed at the 12, 3, 6, and 9 o'clock positions of the lumpectomy cavity as well as posterior and anterior.  The lumpectomy cavity was reapproximated with several interrupted 3-0 vicryl sutures. The skin was closed with a deep dermal 3-0 vicryl and running 4-0 Monocryl subcuticular suture and steri strips.  The patient tolerated the procedure well.  Sponge and instrument counts were correct.    Elise Slater MD  [facility]  [phone]

## 2020-07-08 NOTE — DISCHARGE INSTRUCTIONS
[facility]  Discharge Instructions: Post-Operative Breast Surgery    ACTIVITY    Take frequent short walks and increase your activity gradually.      Avoid strenuous physical activity or heavy lifting greater than 15-20 lbs. for 1-2 weeks with arm on the surgery side.  You may climb stairs.    Gentle rotation and stretching of your arms and shoulders will prevent joint stiffness.    You may drive without restrictions when you are not using any prescription pain medication and feel comfortable in a car.    You may return to work/school when you are comfortable without any prescription pain medication.    WOUND CARE    You may remove your ACE wrap, outer dressings, and shower 48 hours after the surgery.   Pat your incisions dry and leave them open to air.  Re-apply dressing (Band-Aids or gauze/tape) as needed for drainage.    You have steri-strips (looks like white tape) on your incisions.  You may peel off the steri-strips 2 weeks after your surgery if they have not peeled off on their own.     Do not soak your incisions in a tub or pool for 2 weeks.     Do not apply any lotions, creams, or ointments to your incisions.    A ridge under your incisions is normal and will gradually resolve.    Wear a supportive bra for 1-2 weeks, day and night.    DIET    Start with liquids, then gradually resume your regular diet as tolerated.     Drink plenty of liquids to stay hydrated.    PAIN    Expect some tenderness and discomfort at the incision site(s).  Use the prescribed pain medication at your discretion.  Expect gradual resolution of your pain over several days.    You may take ibuprofen with food (unless you have been told not to) instead of or in addition to your prescribed pain medication.  If you are taking Norco or Percocet, do not take any additional acetaminophen/APAP/Tylenol.    Do not drink alcohol or drive while you are taking pain medications.    You may apply ice to your incisions  in 20 minute intervals as needed for the next 48 hours.      EXPECTATIONS    Pain medications can cause constipation.  Limit use when possible.  Take over the counter stool softener/stimulant, such as Colace or Senna, 1-2 times a day with plenty of water.  You may take a mild over the counter laxative, such as Miralax or a suppository, as needed.      You may discontinue these medications once you are having regular bowel movements and/or are no longer taking your narcotic pain medication.    Blue dye may have been used during your surgery to locate lymph nodes and can cause your urine to be blue/green for several days after surgery.  This is not a cause for concern and will resolve on its own.     RETURN APPOINTMENT    Follow up with Dr. Slater in 2 weeks.  Please call the office at [phone] to schedule your appointment.      CALL OUR OFFICE -[phone] IF YOU HAVE:     Chills or fever above 101 F.    Increased redness, warmth, or drainage at your incisions.    Significant bleeding.    Pain not relieved by your pain medication or rest.    Increasing pain after the first 48 hours.    Any other concerns or questions.    Same Day Surgery Discharge Instructions for  Sedation and General Anesthesia       It's not unusual to feel dizzy, light-headed or faint for up to 24 hours after surgery or while taking pain medication.  If you have these symptoms: sit for a few minutes before standing and have someone assist you when you get up to walk or use the bathroom.      You should rest and relax for the next 24 hours. We recommend you make arrangements to have an adult stay with you for at least 24 hours after your discharge.  Avoid hazardous and strenuous activity.      DO NOT DRIVE any vehicle or operate mechanical equipment for 24 hours following the end of your surgery.  Even though you may feel normal, your reactions may be affected by the medication you have received.      Do not drink alcoholic beverages  for 24 hours following surgery.       Slowly progress to your regular diet as you feel able. It's not unusual to feel nauseated and/or vomit after receiving anesthesia.  If you develop these symptoms, drink clear liquids (apple juice, ginger ale, broth, 7-up, etc. ) until you feel better.  If your nausea and vomiting persists for 24 hours, please notify your surgeon.        All narcotic pain medications, along with inactivity and anesthesia, can cause constipation. Drinking plenty of liquids and increasing fiber intake will help.      For any questions of a medical nature, call your surgeon.      Do not make important decisions for 24 hours.      If you had general anesthesia, you may have a sore throat for a couple of days related to the breathing tube used during surgery.  You may use Cepacol lozenges to help with this discomfort.  If it worsens or if you develop a fever, contact your surgeon.       If you feel your pain is not well managed with the pain medications prescribed by your surgeon, please contact your surgeon's office to let them know so they can address your concerns.       CoVid 19 Information    We want to give you information regarding Covid. Please consult your primary care provider with any questions you might have.     Patient who have symptoms (cough, fever, or shortness of breath), need to isolate for 7 days from when symptoms started OR 72 hours after fever resolves (without fever reducing medications) AND improvement of respiratory symptoms (whichever is longer).      Isolate yourself at home (in own room/own bathroom if possible)    Do Not allow any visitors    Do Not go to work or school    Do Not go to Spiritism,  centers, shopping, or other public places.    Do Not shake hands.    Avoid close and intimate contact with others (hugging, kissing).    Follow CDC recommendations for household cleaning of frequently touched services.     After the initial 7 days, continue to isolate  yourself from household members as much as possible. To continue decrease the risk of community spread and exposure, you and any members of your household should limit activities in public for 14 days after starting home isolation.     You can reference the following CDC link for helpful home isolation/care tips:  https://www.cdc.gov/coronavirus/2019-ncov/downloads/10Things.pdf    Protect Others:    Cover Your Mouth and Nose with a mask, disposable tissue or wash cloth to avoid spreading germs to others.    Wash your hands and face frequently with soap and water    Call Your Primary Doctor If: Breathing difficulty develops or you become worse.    For more information about COVID19 and options for caring for yourself at home, please visit the CDC website at https://www.cdc.gov/coronavirus/2019-ncov/about/steps-when-sick.html  For more options for care at [facility], please visit our website at [email or web address]    **If you have concerns or questions about your procedure,    please contact Dr Slater at  [phone]**    Revised October 2018

## 2020-07-08 NOTE — ANESTHESIA PREPROCEDURE EVALUATION
Anesthesia Pre-Procedure Evaluation    Patient: Madison Garcia   MRN: [id number] : 1960          Preoperative Diagnosis: Ductal carcinoma in situ (DCIS) of right breast [D05.11]    Procedure(s):  SEED LOCALIZED RIGHT BREAST LUMPECTOMY    Past Medical History:   Diagnosis Date     Abdominal pain  and     ct neg , ovar us  with fallopian cyst and fibroids     Anxiety and depression      ASCVD (arteriosclerotic cardiovascular disease)     pos est, ct angio and cards fu, 25-49% lad     Chest tightness 2019    neg est echo     Chronic cystitis     Dr. Mcgrath     Chronic insomnia     for years, seen in sleep clinic      Colonic polyp fu nl     fu due , fu done  and nl, to fu      [place] (dyspnea on exertion)     est echo nl , ct chest and pulm eval by Dr. Quiroga and no cause found     Elevated TSH 2017     Hyperlipidaemia      Leg pain     stopped lipitor     Lung nodules 2012    seen on coronary calcium ct, fu 1 year, fu done  and likely benign, fu done  and no change, Dr. Quiroga     MVP (mitral valve prolapse)      Had it surgically repaired, Robotic, done [place]  seen by cardiology , echo  with mild mr and nl lv fxn, fu echo  with lv size upper limits of nl, nl ef, no wma, mild lae, mvp and mild mr present     Palpitations      Screening 2012    cor ct score 0     Past Surgical History:   Procedure Laterality Date     HERNIA REPAIR       REPAIR VALVE MITRAL      done at [place], robotic       Anesthesia Evaluation     . Pt has had prior anesthetic.     History of anesthetic complications   - PONV        ROS/MED HX    ENT/Pulmonary: Comment: Lung nodule     (-) asthma and COPD   Neurologic:      (-) CVA and TIA   Cardiovascular: Comment: TMJ    (+) Dyslipidemia, --CAD, --. : . . . :. valvular problems/murmurs type: MR s/p mitral valve repair:.       METS/Exercise Tolerance:     Hematologic:         Musculoskeletal:        "  GI/Hepatic:        (-) GERD and liver disease   Renal/Genitourinary: Comment: cystitis     (-) renal disease   Endo:         Psychiatric:         Infectious Disease:         Malignancy:   (+) Malignancy History of Breast          Other:                          Physical Exam      Airway   Mallampati: II  TM distance: <3 FB  Neck ROM: full    Dental   (+) caps    Cardiovascular   Rhythm and rate: regular      Pulmonary    breath sounds clear to auscultation            Lab Results   Component Value Date    WBC 4.4 06/29/2020    HGB 12.3 06/29/2020    HCT 36.8 06/29/2020     06/29/2020     06/29/2020    POTASSIUM 4.4 06/29/2020    CHLORIDE 106 06/29/2020    CO2 27 06/29/2020    BUN 12 06/29/2020    CR 0.85 06/29/2020    GLC 93 06/29/2020    JANIS 9.2 06/29/2020    ALBUMIN 3.8 12/02/2019    PROTTOTAL 7.3 12/02/2019    ALT 30 12/02/2019    AST 20 12/02/2019    ALKPHOS 66 12/02/2019    BILITOTAL 0.6 12/02/2019    INR 2.4 (A) 07/15/2013    TSH 5.54 (H) 12/02/2019    T4 1.04 12/02/2019       Preop Vitals  BP Readings from Last 3 Encounters:   07/08/20 (!) 140/74   06/29/20 105/67   12/02/19 123/77    Pulse Readings from Last 3 Encounters:   06/29/20 77   12/02/19 74   11/08/18 84      Resp Readings from Last 3 Encounters:   03/08/16 16   01/28/13 16    SpO2 Readings from Last 3 Encounters:   07/08/20 99%   06/29/20 97%   12/02/19 100%      Temp Readings from Last 1 Encounters:   07/08/20 36.1  C (96.9  F) (Temporal)    Ht Readings from Last 1 Encounters:   07/08/20 1.74 m (5' 8.5\")      Wt Readings from Last 1 Encounters:   07/08/20 79.8 kg (175 lb 14.4 oz)    Estimated body mass index is 26.36 kg/m  as calculated from the following:    Height as of this encounter: 1.74 m (5' 8.5\").    Weight as of this encounter: 79.8 kg (175 lb 14.4 oz).       Anesthesia Plan      History & Physical Review  History and physical reviewed and following examination; no interval change.    ASA Status:  2 .    NPO Status:  > 8 " hours    Plan for General   PONV prophylaxis:  Ondansetron (or other 5HT-3) and Dexamethasone or Solumedrol  Propofol gtt           Postoperative Care      Consents  Anesthetic plan, risks, benefits and alternatives discussed with:  Patient..                 Mayra Kahn

## 2020-07-09 ENCOUNTER — TELEPHONE (OUTPATIENT)
Dept: SURGERY | Facility: PHYSICIAN GROUP | Age: 60
End: 2020-07-09

## 2020-07-09 NOTE — TELEPHONE ENCOUNTER
I called Madison and discussed her pathology results. It revealed a microscopic foci of intraductal carcinoma, high nuclear grade without comedonecrosis measuring 1.5mm. No evidence of invasive malignancy.  She is doing well with minimal discomfort. She will see me in two weeks for follow up. I will have our pathologist re-check ER/MS status on final pathology, was negative on core needle biopsy. I also discussed that with her seed placement, a 1cm nodule was seen near the seed. This was described in pathology as a fibrous nodule under gross evaluation. I spoke with Dr. Melendrez with pathology who will check this area again to further evaluate.     Elise Slater MD  [facility]  [phone]

## 2020-07-10 ENCOUNTER — TELEPHONE (OUTPATIENT)
Dept: SURGERY | Facility: CLINIC | Age: 60
End: 2020-07-10

## 2020-07-10 DIAGNOSIS — Z17.1 MALIGNANT NEOPLASM OF UPPER-INNER QUADRANT OF RIGHT BREAST IN FEMALE, ESTROGEN RECEPTOR NEGATIVE (H): ICD-10-CM

## 2020-07-10 DIAGNOSIS — D05.11 DUCTAL CARCINOMA IN SITU (DCIS) OF RIGHT BREAST: Primary | ICD-10-CM

## 2020-07-10 DIAGNOSIS — C50.211 MALIGNANT NEOPLASM OF UPPER-INNER QUADRANT OF RIGHT BREAST IN FEMALE, ESTROGEN RECEPTOR NEGATIVE (H): ICD-10-CM

## 2020-07-10 LAB — COPATH REPORT: NORMAL

## 2020-07-10 NOTE — TELEPHONE ENCOUNTER
Madison is POD#2 from right seed localized partial mastectomy with Dr. Slater. Post procedure call placed to patient.     Madison reports healing well at home. She plans to shower this afternoon. Wound and Dressing care reviewed with Madison.     Madison has her post op appointment with Dr. Slater on 723. She will follow-up with radiation oncology on 7/27. Referral to medical oncology placed.    Madison knows to contact us in the interim with additional questions or concerns. Both parties in agreement of plan.    Carissa Horne RN, BSN, OCN  Oncology Care Coordinator  [facility]  [facility]  Phone: [phone]

## 2020-07-14 NOTE — TELEPHONE ENCOUNTER
ONCOLOGY INTAKE: Records Information      APPT INFORMATION:  Referring provider:  Dr. Elise Slater MD  Referring provider s clinic:   [facility]  Reason for visit/diagnosis:  Ductal carcinoma in situ (DCIS) of right breast [D05.11]  Has patient been notified of appointment date and time?: Yes    RECORDS INFORMATION:  Were the records received with the referral (via [employer])? No,Internal Referral      Has patient been seen for any external appt for this diagnosis? No    If yes, where? NA    ADDITIONAL INFORMATION:  Patient declined a video visit and wants to come in person.

## 2020-07-15 NOTE — TELEPHONE ENCOUNTER
RECORDS STATUS - BREAST    RECORDS REQUESTED FROM: EPIC   DATE REQUESTED: 7/28/2020    NOTES DETAILS STATUS   OFFICE NOTE from referring provider  [facility]    OFFICE NOTE from medical oncologist N/A    OFFICE NOTE from surgeon Complete 7/8/2020 Seed Localized Right Breast Lumpectomy    OFFICE NOTE from radiation oncologist     DISCHARGE SUMMARY from hospital Complete  7/8/2020 Postop Pain- Ductal Carcinoma    DISCHARGE REPORT from the ER     OPERATIVE REPORT     MEDICATION LIST Complete [facility]   CLINICAL TRIAL TREATMENTS TO DATE     LABS     PATHOLOGY REPORTS  (Tissue diagnosis, Stage, ER/ID percentage positive and intensity of staining, HER2 IHC, FISH, and all biopsies from breast and any distant metastasis)                 Complete  Surgical Path 7/8/2020   Breast, right, lumpectomy   - Microscopic foci of intraductal carcinoma, high nuclear grade, cribiform    type without comedo necrosis, no   evidence of invasive malignancy, margins of excision free of malignancy    GENONOMIC TESTING     TYPE:   (Next Generation Sequencing, including Foundation One testing, and Oncotype score)     IMAGING (NEED IMAGES & REPORT)     CT SCANS     MRI Complete MRI Breast 6/16/2020   MAMMO Complete MA Breast 7/8/2020    [facility] 6/1/2020   ULTRASOUND Complete US Breast 7/8/2020    Complete- [facility] 6/1/2020   PET     BONE SCAN     BRAIN MRI       Action    Action Taken 7/15/2020 4:18pm     I called pt Madison- her phone went to .

## 2020-07-22 NOTE — PROGRESS NOTES
[facility] Breast Surgery Postoperative Note    S: Madison is doing well after surgery. She has minimal pain.     Breasts: right breast lumpectomy site is healing well. Incision with small scab medially. No erythema or induration. No seroma.    Pathology:  SPECIMEN(S):   Right breast lumpectomy     FINAL DIAGNOSIS:   <<<<<  Breast, right, lumpectomy   - Microscopic foci of intraductal carcinoma, high nuclear grade, cribiform    type without comedo necrosis, no   evidence of invasive malignancy, margins of excision free of malignancy     A/P  Madison Garcia is recovering from a seed localized right breast lumpectomy on 7/8/2020 for ER/AR negative grade 3 DCIS. She is recovering well. As this was ER/AR negative, there is no role for adjuvant endocrine therapy. She would benefit from adjuvant radiation and has already met Dr. Santillan. I will have her meet with oncology to ensure no other recommendations and for ongoing follow up, cancer survivorship, etc.       Thank you for the opportunity to help in her care.    Elise Slater MD  [facility]  [phone]    Please route or send letter to:  Primary Care Provider (PCP) and Referring Provider

## 2020-07-23 ENCOUNTER — OFFICE VISIT (OUTPATIENT)
Dept: SURGERY | Facility: CLINIC | Age: 60
End: 2020-07-23
Payer: COMMERCIAL

## 2020-07-23 DIAGNOSIS — D05.11 DUCTAL CARCINOMA IN SITU (DCIS) OF RIGHT BREAST: Primary | ICD-10-CM

## 2020-07-23 PROCEDURE — 99024 POSTOP FOLLOW-UP VISIT: CPT | Performed by: SURGERY

## 2020-07-23 NOTE — NURSING NOTE
Madison Garcia's goals for this visit include:   Chief Complaint   Patient presents with     Surgical Followup     lumpectomy       She requests these members of her care team be copied on today's visit information: yes    PCP: Tera Keith    Referring Provider:  Tera Keith MD  [address] [address]    There were no vitals taken for this visit.    Do you need any medication refills at today's visit? No    Radha Phillips LPN

## 2020-07-23 NOTE — LETTER
7/23/2020         RE: Madison Garcia  [id number] [address] [place] [id number]        Dear Colleague,    Thank you for referring your patient, Madison Garcia, to the [facility]. Please see a copy of my visit note below.    [facility] Breast Surgery Postoperative Note    S: Madison is doing well after surgery. She has minimal pain.     Breasts: right breast lumpectomy site is healing well. Incision with small scab medially. No erythema or induration. No seroma.    Pathology:  SPECIMEN(S):   Right breast lumpectomy     FINAL DIAGNOSIS:   <<<<<  Breast, right, lumpectomy   - Microscopic foci of intraductal carcinoma, high nuclear grade, cribiform    type without comedo necrosis, no   evidence of invasive malignancy, margins of excision free of malignancy     A/P  Madison Garcia is recovering from a seed localized right breast lumpectomy on 7/8/2020 for ER/MS negative grade 3 DCIS. She is recovering well. As this was ER/MS negative, there is no role for adjuvant endocrine therapy. She would benefit from adjuvant radiation and has already met Dr. Santillan. I will have her meet with oncology to ensure no other recommendations and for ongoing follow up, cancer survivorship, etc.       Thank you for the opportunity to help in her care.    Elise Slater MD  [facility]  [phone]    Please route or send letter to:  Primary Care Provider (PCP) and Referring Provider      Again, thank you for allowing me to participate in the care of your patient.        Sincerely,        Elise Slater MD

## 2020-07-27 ENCOUNTER — OFFICE VISIT (OUTPATIENT)
Dept: RADIATION ONCOLOGY | Facility: CLINIC | Age: 60
End: 2020-07-27
Payer: COMMERCIAL

## 2020-07-27 DIAGNOSIS — Z17.1 MALIGNANT NEOPLASM OF UPPER-INNER QUADRANT OF RIGHT BREAST IN FEMALE, ESTROGEN RECEPTOR NEGATIVE (H): Primary | ICD-10-CM

## 2020-07-27 DIAGNOSIS — C50.211 MALIGNANT NEOPLASM OF UPPER-INNER QUADRANT OF RIGHT BREAST IN FEMALE, ESTROGEN RECEPTOR NEGATIVE (H): Primary | ICD-10-CM

## 2020-07-27 PROCEDURE — 77263 THER RADIOLOGY TX PLNG CPLX: CPT | Performed by: RADIOLOGY

## 2020-07-27 PROCEDURE — 77290 THER RAD SIMULAJ FIELD CPLX: CPT | Performed by: RADIOLOGY

## 2020-07-28 ENCOUNTER — PRE VISIT (OUTPATIENT)
Dept: SURGERY | Facility: PHYSICIAN GROUP | Age: 60
End: 2020-07-28

## 2020-07-28 ENCOUNTER — VIRTUAL VISIT (OUTPATIENT)
Dept: ONCOLOGY | Facility: CLINIC | Age: 60
End: 2020-07-28
Attending: SURGERY
Payer: COMMERCIAL

## 2020-07-28 VITALS — WEIGHT: 175 LBS | BODY MASS INDEX: 25.92 KG/M2 | HEIGHT: 69 IN

## 2020-07-28 DIAGNOSIS — D05.11 DUCTAL CARCINOMA IN SITU (DCIS) OF RIGHT BREAST: ICD-10-CM

## 2020-07-28 PROCEDURE — 99204 OFFICE O/P NEW MOD 45 MIN: CPT | Mod: 95 | Performed by: INTERNAL MEDICINE

## 2020-07-28 ASSESSMENT — PAIN SCALES - GENERAL: PAINLEVEL: MILD PAIN (2)

## 2020-07-28 ASSESSMENT — MIFFLIN-ST. JEOR: SCORE: 1430.29

## 2020-07-28 NOTE — LETTER
2020         RE: Madison Garcia  [address] [address] [place] [id number]        Dear Colleague,    Thank you for referring your patient, Madison Garcia, to the [facility]. Please see a copy of my visit note below.    Visit Date:   2020      Elise Slater MD   [facility] [facility], [address]   [place] [address]      PATIENT:  Madison Garcia   MRN:  [id number]   :  1960      Dear Dr. Slater:      Thank you for asking us to see your patient, Madison Garcia, in consultation for further evaluation and management of her recently diagnosed right breast DCIS.  Please see enclosed note for details of our visit on 2020 as well as recommendations.      HISTORY OF PRESENT ILLNESS:  Madison Garcia is a 60-year-old female who presents to the [facility] for further evaluation of right breast DCIS.  The patient's history dates back to 2020 when she was undergoing a routine screening mammogram and was found to have microcalcifications in the upper inner quadrant, posterior depth.  She proceeded to have a diagnostic mammogram, which showed a 2 cm group of amorphous calcifications at the 1 o'clock position.  A stereotactic biopsy confirmed DCIS, grade 3, ER negative.  No invasive tumor.  She was then evaluated by Dr. Elise Slater.  Taken to the operating theatre on 2020.  Underwent right seed localized partial mastectomy.  Final pathology revealed a 1.5 mm DCIS, grade 3, margins negative, pTis NX.      The patient has met with Dr. Santillan and is planning radiotherapy in the next 1-2 weeks.      On today's visit, the patient states that prior to mammogram she had not self-palpated any breast masses, noted any skin changes, nipple changes.  Does report a left breast biopsy about 2 years ago, which was benign.  Surgery overall went well.  Denies any fevers, chills, nausea, vomiting,  chest pain, shortness of breath, cough.  No abdominal discomfort.  Uses Xanax, melatonin, Advil and Tylenol to help her sleep.  The remainder of her comprehensive review of systems is negative.      PAST MEDICAL HISTORY:   1.  Right breast DCIS, see above.   2.  Mitral valve repair.   3.  Hyperlipidemia.   4.  Elevated TSH.   5.  Atherosclerotic cardiovascular disease.   6.  Depression.   7.  Lung nodule.  Has shown calcifications and was told this was benign.      GYNECOLOGIC HISTORY:  Menarche at 15.  Menopause around 50 or 51, less than 1 year on oral contraceptives.  No prior history of hormone replacement therapy or fertility drugs.   1, para 0.  Has 2 adopted children.      FAMILY HISTORY:  Maternal grandmother had breast cancer in her 70s.  There are possible other cancers in her family, but she is unsure.      SOCIAL HISTORY:   with 2 adopted children.  Denies tobacco use.  Alcohol about once a month.  She is a , working from home.      PHYSICAL EXAM:  GENERAL: Comfortable, no acute distress.   HEAD: Atraumatic/Normocephalic.  EYES: Sclera non-icteric. Grossly normal.  RESPIRATORY: Normal breathing, non-labored. No audible wheezes/cough.  SKIN: No jaundice, discoloration or obvious lesions.  NERUO: No tremors visible. Normal speech.  PSYCH: Alert, oriented. Answered questions appropriately.    The rest of a comprehensive physical examination is deferred due to PHE (public health emergency) video visit restrictions.    IMAGING:  As stated in the HPI.      PATHOLOGY:  As stated in the HPI.        MRI of the breast from 2020:  Nodular enhancement at the site of biopsy proven high-grade DCIS about 1.4 cm, which may represent post-biopsy change or residual malignancy.  No other suspicious enhancement of the right breast.  No suspicious enhancement in the left breast.  A group of normal-appearing lymph nodes in the right lower axilla.  Asymmetric right diaphragmatic lymph  node in internal mammary chain lymph nodes of uncertain clinical significance.      ASSESSMENT AND PLAN:   1.  Right breast ductal carcinoma in situ:  I discussed at length with the patient regarding the findings today.  I explained to the patient is considered to have stage 0 breast cancer.  The goal of therapy is to decrease her risk of recurrence.  Since she is ER negative, we would not recommend endocrine therapy.  We agree with the recommendations to proceed with radiation.  We discussed followup, which would be every 6 months for the next 5 years with clinical breast exams.  Since the patient is already going to be seeing Dr. Slater, we talked about her seeing both of us for exam versus only following with Dr. Slater.  The patient is comfortable with just following up with Dr. Slater..   2.  Right diaphragmatic and intramammary lymph nodes:  This was noted on breast MRI.  Recommend a followup MRI in 6 months.  If there are any changes, the patient to return to be seen in Medical Oncology.   3.  Health care maintenance.  Discussed the importance of healthy lifestyle with diet and exercise.  Recommend 150 minutes of moderate exercise a week with diet rich in fruits, vegetables, fiber and avoiding processed meats.   4. COVID-19 Precautions. Discussed the importance of good hand washing techniques with soap and water for at least 20 seconds, avoid touching eyes, nose, mouth, avoiding crowds, social distancing.     At the end of discussion, we discussed that in order not to repeat multiple similar visits, she will follow up with Dr. Slater.  However, if she has any questions or concerns in the future, she should feel free to contact our clinic.  I have spoken to Dr. Slater, who is comfortable with this plan.      Dr. Slater will also schedule following up on the MRI in 6 months.  Again, if there are any questions on the MRI, patient will return to see us.     At the end of discussion, all  questions addressed to the best of my ability.  The patient has verbalized understanding and agreeable with the plan.  The patient's case has been discussed with Dr. Elise Slater who is also agreeable with the plan.      Dr. Slater, thank you for giving me the opportunity to participate in this delightful patient's care as well as taking the time to discuss her case.  If you have any questions, please feel free to contact me.     Video-Visit Details    Type of service:  Video Visit    Video Start and End Time: 12:44 p.m. to 1:06 p.m.     Originating Location (pt. Location): Home    Distant Location (provider location):  [facility]     Mode of Communication:  Video Conference via Doximity video visit.        MICHAEL HUERTA MD    Above note accurate for diagnosis, plan and orders placed. Epic/EMR orders and data may not be accurate because of available options, information not entered correctly/updated by staff other than writer or interface issues. All data entered by writer with the intent patient care not be compromised nor patient be unnecessarily billed      D: 2020   T: 2020   MT: MATIAS      Name:     CHIARA HANKS   MRN:      [id number]        Account:      [id number]   :      1960           Visit Date:   2020      Document: [id number]       cc: Elise Slater MD       Again, thank you for allowing me to participate in the care of your patient.        Sincerely,        Michael Huerta MD

## 2020-07-28 NOTE — PROGRESS NOTES
Visit Date:   2020      Elise Slater MD   [facility] [facility], [address]   [place] [address]      PATIENT:  Madison Garcia   MRN:  [id number]   :  1960      Dear Dr. Slater:      Thank you for asking us to see your patient, Madison Garcia, in consultation for further evaluation and management of her recently diagnosed right breast DCIS.  Please see enclosed note for details of our visit on 2020 as well as recommendations.      HISTORY OF PRESENT ILLNESS:  Madison Garcia is a 60-year-old female who presents to the [facility] for further evaluation of right breast DCIS.  The patient's history dates back to 2020 when she was undergoing a routine screening mammogram and was found to have microcalcifications in the upper inner quadrant, posterior depth.  She proceeded to have a diagnostic mammogram, which showed a 2 cm group of amorphous calcifications at the 1 o'clock position.  A stereotactic biopsy confirmed DCIS, grade 3, ER negative.  No invasive tumor.  She was then evaluated by Dr. Elise Slater.  Taken to the operating theatre on 2020.  Underwent right seed localized partial mastectomy.  Final pathology revealed a 1.5 mm DCIS, grade 3, margins negative, pTis NX.      The patient has met with Dr. Santillan and is planning radiotherapy in the next 1-2 weeks.      On today's visit, the patient states that prior to mammogram she had not self-palpated any breast masses, noted any skin changes, nipple changes.  Does report a left breast biopsy about 2 years ago, which was benign.  Surgery overall went well.  Denies any fevers, chills, nausea, vomiting, chest pain, shortness of breath, cough.  No abdominal discomfort.  Uses Xanax, melatonin, Advil and Tylenol to help her sleep.  The remainder of her comprehensive review of systems is negative.      PAST MEDICAL HISTORY:   1.  Right breast DCIS, see  above.   2.  Mitral valve repair.   3.  Hyperlipidemia.   4.  Elevated TSH.   5.  Atherosclerotic cardiovascular disease.   6.  Depression.   7.  Lung nodule.  Has shown calcifications and was told this was benign.      GYNECOLOGIC HISTORY:  Menarche at 15.  Menopause around 50 or 51, less than 1 year on oral contraceptives.  No prior history of hormone replacement therapy or fertility drugs.   1, para 0.  Has 2 adopted children.      FAMILY HISTORY:  Maternal grandmother had breast cancer in her 70s.  There are possible other cancers in her family, but she is unsure.      SOCIAL HISTORY:   with 2 adopted children.  Denies tobacco use.  Alcohol about once a month.  She is a , working from home.      PHYSICAL EXAM:  GENERAL: Comfortable, no acute distress.   HEAD: Atraumatic/Normocephalic.  EYES: Sclera non-icteric. Grossly normal.  RESPIRATORY: Normal breathing, non-labored. No audible wheezes/cough.  SKIN: No jaundice, discoloration or obvious lesions.  NERUO: No tremors visible. Normal speech.  PSYCH: Alert, oriented. Answered questions appropriately.    The rest of a comprehensive physical examination is deferred due to PHE (public health emergency) video visit restrictions.    IMAGING:  As stated in the HPI.      PATHOLOGY:  As stated in the HPI.        MRI of the breast from 2020:  Nodular enhancement at the site of biopsy proven high-grade DCIS about 1.4 cm, which may represent post-biopsy change or residual malignancy.  No other suspicious enhancement of the right breast.  No suspicious enhancement in the left breast.  A group of normal-appearing lymph nodes in the right lower axilla.  Asymmetric right diaphragmatic lymph node in internal mammary chain lymph nodes of uncertain clinical significance.      ASSESSMENT AND PLAN:   1.  Right breast ductal carcinoma in situ:  I discussed at length with the patient regarding the findings today.  I explained to the patient is  considered to have stage 0 breast cancer.  The goal of therapy is to decrease her risk of recurrence.  Since she is ER negative, we would not recommend endocrine therapy.  We agree with the recommendations to proceed with radiation.  We discussed followup, which would be every 6 months for the next 5 years with clinical breast exams.  Since the patient is already going to be seeing Dr. Slater, we talked about her seeing both of us for exam versus only following with Dr. Slater.  The patient is comfortable with just following up with Dr. Slater..   2.  Right diaphragmatic and intramammary lymph nodes:  This was noted on breast MRI.  Recommend a followup MRI in 6 months.  If there are any changes, the patient to return to be seen in Medical Oncology.   3.  Health care maintenance.  Discussed the importance of healthy lifestyle with diet and exercise.  Recommend 150 minutes of moderate exercise a week with diet rich in fruits, vegetables, fiber and avoiding processed meats.   4. COVID-19 Precautions. Discussed the importance of good hand washing techniques with soap and water for at least 20 seconds, avoid touching eyes, nose, mouth, avoiding crowds, social distancing.     At the end of discussion, we discussed that in order not to repeat multiple similar visits, she will follow up with Dr. Slater.  However, if she has any questions or concerns in the future, she should feel free to contact our clinic.  I have spoken to Dr. Slater, who is comfortable with this plan.      Dr. Slater will also schedule following up on the MRI in 6 months.  Again, if there are any questions on the MRI, patient will return to see us.     At the end of discussion, all questions addressed to the best of my ability.  The patient has verbalized understanding and agreeable with the plan.  The patient's case has been discussed with Dr. Elise Slater who is also agreeable with the plan.      Dr. Slater, thank you for  giving me the opportunity to participate in this delightful patient's care as well as taking the time to discuss her case.  If you have any questions, please feel free to contact me.     Video-Visit Details    Type of service:  Video Visit    Video Start and End Time: 12:44 p.m. to 1:06 p.m.     Originating Location (pt. Location): Home    Distant Location (provider location):  [facility]     Mode of Communication:  Video Conference via Doximity video visit.        MICHAEL GR MD    Above note accurate for diagnosis, plan and orders placed. Epic/EMR orders and data may not be accurate because of available options, information not entered correctly/updated by staff other than writer or interface issues. All data entered by writer with the intent patient care not be compromised nor patient be unnecessarily billed      D: 2020   T: 2020   MT: MATIAS      Name:     CHIARA HANKS   MRN:      [id number]        Account:      [id number]   :      1960           Visit Date:   2020      Document: [id number]       cc: Elise Slater MD

## 2020-07-28 NOTE — NURSING NOTE
SYMPTOM QUESTIONNAIRE    Pain: 2/10 at right breast surgery site    Nausea/Vomiting: no    Mouth Sores: no    Shortness of Breath: no    Smoking: no    Fever or Chills: no    Hard Stools: no    Soft Stools: no    Weight Loss: no    Weakness: no    Burning, numbness or tingling in hands or feet: no    Problems with skin or swelling: no    Memory Loss: no    Anxiety or Depression: yes    Trouble Sleeping: yes, takes Xanax, melatonin, advil and tylenol    Patient would like to discuss next steps for treatment today. Are there any lifestyle changes she needs to make? Has questions about lymph nodes on MRI.

## 2020-07-29 ENCOUNTER — APPOINTMENT (OUTPATIENT)
Dept: RADIATION ONCOLOGY | Facility: CLINIC | Age: 60
End: 2020-07-29
Payer: COMMERCIAL

## 2020-07-29 PROCEDURE — 77334 RADIATION TREATMENT AID(S): CPT | Performed by: RADIOLOGY

## 2020-07-29 PROCEDURE — 77295 3-D RADIOTHERAPY PLAN: CPT | Performed by: RADIOLOGY

## 2020-07-29 PROCEDURE — 77300 RADIATION THERAPY DOSE PLAN: CPT | Performed by: RADIOLOGY

## 2020-08-04 ENCOUNTER — APPOINTMENT (OUTPATIENT)
Dept: RADIATION ONCOLOGY | Facility: CLINIC | Age: 60
End: 2020-08-04
Payer: COMMERCIAL

## 2020-08-04 PROCEDURE — 77280 THER RAD SIMULAJ FIELD SMPL: CPT | Performed by: RADIOLOGY

## 2020-08-05 ENCOUNTER — OFFICE VISIT (OUTPATIENT)
Dept: RADIATION ONCOLOGY | Facility: CLINIC | Age: 60
End: 2020-08-05
Payer: COMMERCIAL

## 2020-08-05 ENCOUNTER — APPOINTMENT (OUTPATIENT)
Dept: RADIATION ONCOLOGY | Facility: CLINIC | Age: 60
End: 2020-08-05
Payer: COMMERCIAL

## 2020-08-05 VITALS
RESPIRATION RATE: 16 BRPM | TEMPERATURE: 97.2 F | WEIGHT: 172.8 LBS | DIASTOLIC BLOOD PRESSURE: 77 MMHG | SYSTOLIC BLOOD PRESSURE: 118 MMHG | BODY MASS INDEX: 25.89 KG/M2 | OXYGEN SATURATION: 100 % | HEART RATE: 72 BPM

## 2020-08-05 DIAGNOSIS — C50.211 MALIGNANT NEOPLASM OF UPPER-INNER QUADRANT OF RIGHT BREAST IN FEMALE, ESTROGEN RECEPTOR NEGATIVE (H): Primary | ICD-10-CM

## 2020-08-05 DIAGNOSIS — Z17.1 MALIGNANT NEOPLASM OF UPPER-INNER QUADRANT OF RIGHT BREAST IN FEMALE, ESTROGEN RECEPTOR NEGATIVE (H): Primary | ICD-10-CM

## 2020-08-05 PROCEDURE — G6012 RADIATION TREATMENT DELIVERY: HCPCS | Performed by: RADIOLOGY

## 2020-08-05 PROCEDURE — 99207 ZZC DROP WITH A PROCEDURE: CPT | Performed by: RADIOLOGY

## 2020-08-05 ASSESSMENT — PAIN SCALES - GENERAL: PAINLEVEL: NO PAIN (0)

## 2020-08-05 NOTE — PROGRESS NOTES
[facility] PHYSICIANS  SPECIALIZING IN BREAKTHROUGHS  Radiation Oncology    On Treatment Visit Note      Madison Garcia      Date: 2020   MRN: [id number]   : 1960  Diagnosis: R breast DCIS      Reason for Visit:  On Radiation Treatment Visit     Treatment Summary to Date  Treatment Site: R breast with boost Current Dose: 266/5256 cGy Fractions:       Chemotherapy  Chemo concurrent with radx?: No    Subjective:     Early in treatment doing well with no complaints.    Nursing ROS:   Nutrition Alteration  Diet Type: Patient's Preference  Skin  Skin Reaction: 0 - No changes        Cardiovascular  Respiratory effort: 1 - Normal - without distress        Psychosocial  Mood - Anxiety: 0 - Normal  Mood - Depression: 0 - Normal  Pain Assessment  0-10 Pain Scale: 0      Objective:   /77 (BP Location: Left arm, Patient Position: Sitting, Cuff Size: Adult Regular)   Pulse 72   Temp 97.2  F (36.2  C) (Oral)   Resp 16   Wt 78.4 kg (172 lb 12.8 oz)   SpO2 100%   BMI 25.89 kg/m    Gen: Appears well, in no acute distress  Skin: No erythema    Labs:  CBC RESULTS:   Recent Labs   Lab Test 20  1605   WBC 4.4   RBC 4.17   HGB 12.3   HCT 36.8   MCV 88   MCH 29.5   MCHC 33.4   RDW 12.1        ELECTROLYTES:  Recent Labs   Lab Test 20  1605      POTASSIUM 4.4   CHLORIDE 106   JANIS 9.2   CO2 27   BUN 12   CR 0.85   GLC 93       Assessment:    Tolerating radiation therapy well.  All questions and concerns addressed.    Plan:   1. Continue current therapy.    2. Skin care per above.  Will add hydrocortisone 1% if develops skin erythema.      BoardVitalsiq chart and setup information reviewed  Ports checked    Medication Review  Med list reviewed with patient?: Yes  Med list printed and given: Offered and declined    Educational Topic Discussed  Education Instructions: Skin cares, aquaphor BID- coupon and samples given, fatigue related to radiation therapy        Cassandra Santillan MD    Please  do not send letter to referring physician.

## 2020-08-05 NOTE — PATIENT INSTRUCTIONS
Please contact [facility] RN with questions or concerns following today's appointment: [phone].    Thank you!

## 2020-08-05 NOTE — LETTER
2020         RE: Madison Garcia  [address] [address] [place] [phone]        Dear Colleague,    Thank you for referring your patient, Madison Garcia, to the [facility]. Please see a copy of my visit note below.    [facility] PHYSICIANS  SPECIALIZING IN BREAKTHROUGHS  Radiation Oncology    On Treatment Visit Note      Madison Garcia      Date: 2020   MRN: [id number]   : 1960  Diagnosis: R breast DCIS      Reason for Visit:  On Radiation Treatment Visit     Treatment Summary to Date  Treatment Site: R breast with boost Current Dose: 266/5256 cGy Fractions:       Chemotherapy  Chemo concurrent with radx?: No    Subjective:     Early in treatment doing well with no complaints.    Nursing ROS:   Nutrition Alteration  Diet Type: Patient's Preference  Skin  Skin Reaction: 0 - No changes        Cardiovascular  Respiratory effort: 1 - Normal - without distress        Psychosocial  Mood - Anxiety: 0 - Normal  Mood - Depression: 0 - Normal  Pain Assessment  0-10 Pain Scale: 0      Objective:   /77 (BP Location: Left arm, Patient Position: Sitting, Cuff Size: Adult Regular)   Pulse 72   Temp 97.2  F (36.2  C) (Oral)   Resp 16   Wt 78.4 kg (172 lb 12.8 oz)   SpO2 100%   BMI 25.89 kg/m    Gen: Appears well, in no acute distress  Skin: No erythema    Labs:  CBC RESULTS:   Recent Labs   Lab Test 20  1605   WBC 4.4   RBC 4.17   HGB 12.3   HCT 36.8   MCV 88   MCH 29.5   MCHC 33.4   RDW 12.1        ELECTROLYTES:  Recent Labs   Lab Test 20  1605      POTASSIUM 4.4   CHLORIDE 106   JANIS 9.2   CO2 27   BUN 12   CR 0.85   GLC 93       Assessment:    Tolerating radiation therapy well.  All questions and concerns addressed.    Plan:   1. Continue current therapy.    2. Skin care per above.  Will add hydrocortisone 1% if develops skin erythema.      CatchTheEyeiq chart and setup information reviewed  Ports checked    Medication Review  Med list  reviewed with patient?: Yes  Med list printed and given: Offered and declined    Educational Topic Discussed  Education Instructions: Skin cares, aquaphor BID- coupon and samples given, fatigue related to radiation therapy        Cassandra Santillan MD    Please do not send letter to referring physician.        Again, thank you for allowing me to participate in the care of your patient.        Sincerely,        Cassandra Santillan MD

## 2020-08-06 ENCOUNTER — APPOINTMENT (OUTPATIENT)
Dept: RADIATION ONCOLOGY | Facility: CLINIC | Age: 60
End: 2020-08-06
Payer: COMMERCIAL

## 2020-08-06 PROCEDURE — G6012 RADIATION TREATMENT DELIVERY: HCPCS | Performed by: RADIOLOGY

## 2020-08-07 ENCOUNTER — APPOINTMENT (OUTPATIENT)
Dept: RADIATION ONCOLOGY | Facility: CLINIC | Age: 60
End: 2020-08-07
Payer: COMMERCIAL

## 2020-08-07 PROCEDURE — G6012 RADIATION TREATMENT DELIVERY: HCPCS | Performed by: RADIOLOGY

## 2020-08-10 ENCOUNTER — APPOINTMENT (OUTPATIENT)
Dept: RADIATION ONCOLOGY | Facility: CLINIC | Age: 60
End: 2020-08-10
Payer: COMMERCIAL

## 2020-08-10 DIAGNOSIS — F41.9 ANXIETY: ICD-10-CM

## 2020-08-10 PROCEDURE — G6012 RADIATION TREATMENT DELIVERY: HCPCS | Performed by: RADIOLOGY

## 2020-08-10 NOTE — TELEPHONE ENCOUNTER
Needs  check    Controlled Substance Refill Request for Alprazolam  Problem List Complete:  No     PROVIDER TO CONSIDER COMPLETION OF PROBLEM LIST AND OVERVIEW/CONTROLLED SUBSTANCE AGREEMENT    Last Written Prescription Date:  5-  Last Fill Quantity: 90,   # refills: 0      Last Office Visit with [facility] primary care provider: 6-29-20 [place], 12-2-19 [place]    Future Office visit:     Controlled substance agreement:   Encounter-Level CSA:    There are no encounter-level csa.     Patient-Level CSA:    There are no patient-level csa.         Last Urine Drug Screen: No results found for: CDAUT, No results found for: COMDAT, No results found for: THC13, PCP13, COC13, MAMP13, OPI13, AMP13, BZO13, TCA13, MTD13, BAR13, OXY13, PPX13, BUP13     Processing:  Rx to be electronically transmitted to pharmacy by provider      [email or web address]       checked in past 3 months?  No, route to RN      RT Kvng (R)

## 2020-08-11 ENCOUNTER — APPOINTMENT (OUTPATIENT)
Dept: RADIATION ONCOLOGY | Facility: CLINIC | Age: 60
End: 2020-08-11
Payer: COMMERCIAL

## 2020-08-11 PROCEDURE — 77417 THER RADIOLOGY PORT IMAGE(S): CPT | Performed by: RADIOLOGY

## 2020-08-11 PROCEDURE — 77336 RADIATION PHYSICS CONSULT: CPT | Performed by: RADIOLOGY

## 2020-08-11 PROCEDURE — 77427 RADIATION TX MANAGEMENT X5: CPT | Performed by: RADIOLOGY

## 2020-08-11 PROCEDURE — G6012 RADIATION TREATMENT DELIVERY: HCPCS | Performed by: RADIOLOGY

## 2020-08-12 ENCOUNTER — OFFICE VISIT (OUTPATIENT)
Dept: RADIATION ONCOLOGY | Facility: CLINIC | Age: 60
End: 2020-08-12
Payer: COMMERCIAL

## 2020-08-12 ENCOUNTER — APPOINTMENT (OUTPATIENT)
Dept: RADIATION ONCOLOGY | Facility: CLINIC | Age: 60
End: 2020-08-12
Payer: COMMERCIAL

## 2020-08-12 VITALS
TEMPERATURE: 97.5 F | BODY MASS INDEX: 25.78 KG/M2 | HEART RATE: 72 BPM | WEIGHT: 172.1 LBS | RESPIRATION RATE: 18 BRPM | SYSTOLIC BLOOD PRESSURE: 107 MMHG | OXYGEN SATURATION: 98 % | DIASTOLIC BLOOD PRESSURE: 72 MMHG

## 2020-08-12 DIAGNOSIS — C50.211 MALIGNANT NEOPLASM OF UPPER-INNER QUADRANT OF RIGHT BREAST IN FEMALE, ESTROGEN RECEPTOR NEGATIVE (H): Primary | ICD-10-CM

## 2020-08-12 DIAGNOSIS — Z17.1 MALIGNANT NEOPLASM OF UPPER-INNER QUADRANT OF RIGHT BREAST IN FEMALE, ESTROGEN RECEPTOR NEGATIVE (H): Primary | ICD-10-CM

## 2020-08-12 PROCEDURE — 99207 ZZC DROP WITH A PROCEDURE: CPT | Performed by: RADIOLOGY

## 2020-08-12 PROCEDURE — G6012 RADIATION TREATMENT DELIVERY: HCPCS | Performed by: RADIOLOGY

## 2020-08-12 RX ORDER — ALPRAZOLAM 1 MG
TABLET ORAL
Qty: 90 TABLET | Refills: 0 | Status: SHIPPED | OUTPATIENT
Start: 2020-08-12 | End: 2020-11-06

## 2020-08-12 ASSESSMENT — PAIN SCALES - GENERAL: PAINLEVEL: NO PAIN (0)

## 2020-08-12 NOTE — LETTER
2020         RE: Madison Garcia  [address] [address] [place] [phone]        Dear Colleague,    Thank you for referring your patient, Madison Garcia, to the [facility]. Please see a copy of my visit note below.    [facility] PHYSICIANS  SPECIALIZING IN BREAKTHROUGHS  Radiation Oncology    On Treatment Visit Note      Madison Garcia      Date: 2020   MRN: [id number]   : 1960  Diagnosis: breast cancer      Reason for Visit:  On Radiation Treatment Visit     Treatment Summary to Date  Treatment Site: right breast Current Dose: 1596/5256 cGy Fractions:       Chemotherapy  Chemo concurrent with radx?: No    Subjective:     Doing well with no complaints    Nursing ROS:   Nutrition Alteration  Diet Type: Patient's Preference  Skin  Skin Reaction: 0 - No changes  Skin Intervention: patient using Aquaphor two times daily        Cardiovascular  Respiratory effort: 1 - Normal - without distress        Psychosocial  Mood - Anxiety: 0 - Normal  Mood - Depression: 0 - Normal  Pyschosocial Note: slight fatigue per patient report  Pain Assessment  0-10 Pain Scale: 0      Objective:   /72 (BP Location: Left arm, Patient Position: Chair, Cuff Size: Adult Regular)   Pulse 72   Temp 97.5  F (36.4  C) (Oral)   Resp 18   Wt 78.1 kg (172 lb 1.6 oz)   SpO2 98%   BMI 25.78 kg/m    Gen: Appears well, in no acute distress  Skin: Minimal diffuse erythema over treatment field    Labs:  CBC RESULTS:   Recent Labs   Lab Test 20  1605   WBC 4.4   RBC 4.17   HGB 12.3   HCT 36.8   MCV 88   MCH 29.5   MCHC 33.4   RDW 12.1        ELECTROLYTES:  Recent Labs   Lab Test 20  1605      POTASSIUM 4.4   CHLORIDE 106   JANIS 9.2   CO2 27   BUN 12   CR 0.85   GLC 93       Assessment:    Tolerating radiation therapy well.  All questions and concerns addressed.    Toxicities:  Fatigue: Grade 1: Fatigue relieved by rest  Dermatitis: Grade 1: Faint erythema or dry  desquamation    Plan:   1. Continue current therapy.  2. Skin care per above.        Mosaiq chart and setup information reviewed  Ports checked    Medication Review  Med list reviewed with patient?: Yes  Med list printed and given: Offered and declined    Educational Topic Discussed  Education Instructions: radiation therapy side effects: fatigue, skin changes and skin cares        Cassandra Santillan MD    Please do not send letter to referring physician.        Again, thank you for allowing me to participate in the care of your patient.        Sincerely,        Cassandra Santillan MD

## 2020-08-12 NOTE — PATIENT INSTRUCTIONS
Please contact [facility] RN with questions or concerns following today's appointment: [phone].    Thank you!

## 2020-08-12 NOTE — TELEPHONE ENCOUNTER
Last [facility] website verification 8/12/2020 [place]   Last visit with PCP 12/2/19, follow up 1 year  [email or web address]    Routing refill request to provider for review/approval because:  Drug not on the [facility] refill protocol     Ni MANJARREZ RN

## 2020-08-12 NOTE — PROGRESS NOTES
[facility] PHYSICIANS  SPECIALIZING IN BREAKTHROUGHS  Radiation Oncology    On Treatment Visit Note      Madison Garcia      Date: 2020   MRN: [id number]   : 1960  Diagnosis: breast cancer      Reason for Visit:  On Radiation Treatment Visit     Treatment Summary to Date  Treatment Site: right breast Current Dose: 1596/5256 cGy Fractions:       Chemotherapy  Chemo concurrent with radx?: No    Subjective:     Doing well with no complaints    Nursing ROS:   Nutrition Alteration  Diet Type: Patient's Preference  Skin  Skin Reaction: 0 - No changes  Skin Intervention: patient using Aquaphor two times daily        Cardiovascular  Respiratory effort: 1 - Normal - without distress        Psychosocial  Mood - Anxiety: 0 - Normal  Mood - Depression: 0 - Normal  Pyschosocial Note: slight fatigue per patient report  Pain Assessment  0-10 Pain Scale: 0      Objective:   /72 (BP Location: Left arm, Patient Position: Chair, Cuff Size: Adult Regular)   Pulse 72   Temp 97.5  F (36.4  C) (Oral)   Resp 18   Wt 78.1 kg (172 lb 1.6 oz)   SpO2 98%   BMI 25.78 kg/m    Gen: Appears well, in no acute distress  Skin: Minimal diffuse erythema over treatment field    Labs:  CBC RESULTS:   Recent Labs   Lab Test 20  1605   WBC 4.4   RBC 4.17   HGB 12.3   HCT 36.8   MCV 88   MCH 29.5   MCHC 33.4   RDW 12.1        ELECTROLYTES:  Recent Labs   Lab Test 20  1605      POTASSIUM 4.4   CHLORIDE 106   JANIS 9.2   CO2 27   BUN 12   CR 0.85   GLC 93       Assessment:    Tolerating radiation therapy well.  All questions and concerns addressed.    Toxicities:  Fatigue: Grade 1: Fatigue relieved by rest  Dermatitis: Grade 1: Faint erythema or dry desquamation    Plan:   1. Continue current therapy.  2. Skin care per above.        Mosaiq chart and setup information reviewed  Ports checked    Medication Review  Med list reviewed with patient?: Yes  Med list printed and given: Offered and  declined    Educational Topic Discussed  Education Instructions: radiation therapy side effects: fatigue, skin changes and skin cares        Cassandra Santillan MD    Please do not send letter to referring physician.

## 2020-08-13 ENCOUNTER — APPOINTMENT (OUTPATIENT)
Dept: RADIATION ONCOLOGY | Facility: CLINIC | Age: 60
End: 2020-08-13
Payer: COMMERCIAL

## 2020-08-13 PROCEDURE — G6012 RADIATION TREATMENT DELIVERY: HCPCS | Performed by: RADIOLOGY

## 2020-08-14 ENCOUNTER — APPOINTMENT (OUTPATIENT)
Dept: RADIATION ONCOLOGY | Facility: CLINIC | Age: 60
End: 2020-08-14
Payer: COMMERCIAL

## 2020-08-14 PROCEDURE — G6012 RADIATION TREATMENT DELIVERY: HCPCS | Performed by: RADIOLOGY

## 2020-08-16 NOTE — PROGRESS NOTES
[facility] PHYSICIANS  SPECIALIZING IN BREAKTHROUGHS  Radiation Oncology    On Treatment Visit Note      Madison Garcia      Date: 2020   MRN: [id number]   : 1960  Diagnosis: breast cancer    IDENTIFICATION: This is a 60 year old woman with right UIQ breast ER- G3 DCIS scheduled for lumpectomy on 2020    Reason for Visit:  On Radiation Treatment Visit     Treatment Summary to Date  Treatment Site: right breast Current Dose: 1596/5256 cGy Fractions:       Chemotherapy  Chemo concurrent with radx?: No    Subjective:     Doing well with no complaints    Nursing ROS:   Nutrition Alteration  Diet Type: Patient's Preference  Skin  Skin Reaction: 0 - No changes  Skin Intervention: patient using Aquaphor two times daily        Cardiovascular  Respiratory effort: 1 - Normal - without distress        Psychosocial  Mood - Anxiety: 0 - Normal  Mood - Depression: 0 - Normal  Pyschosocial Note: slight fatigue per patient report  Pain Assessment  0-10 Pain Scale: 0      Objective:   There were no vitals taken for this visit.  Gen: Appears well, in no acute distress  Skin: Minimal diffuse erythema over treatment field    Labs:  CBC RESULTS:   Recent Labs   Lab Test 20  1605   WBC 4.4   RBC 4.17   HGB 12.3   HCT 36.8   MCV 88   MCH 29.5   MCHC 33.4   RDW 12.1        ELECTROLYTES:  Recent Labs   Lab Test 20  1605      POTASSIUM 4.4   CHLORIDE 106   JANIS 9.2   CO2 27   BUN 12   CR 0.85   GLC 93       Assessment:    Tolerating radiation therapy well.  All questions and concerns addressed.    Toxicities:  Fatigue: Grade 1: Fatigue relieved by rest  Dermatitis: Grade 1: Faint erythema or dry desquamation    Plan:   1. Continue current therapy.  2. Skin care per above.        Mosaiq chart and setup information reviewed  Ports checked    Medication Review  Med list reviewed with patient?: Yes  Med list printed and given: Offered and declined    Educational Topic Discussed  Education  Instructions: radiation therapy side effects: fatigue, skin changes and skin cares    John Pacheco MD    Please do not send letter to referring physician.

## 2020-08-17 ENCOUNTER — APPOINTMENT (OUTPATIENT)
Dept: RADIATION ONCOLOGY | Facility: CLINIC | Age: 60
End: 2020-08-17
Payer: COMMERCIAL

## 2020-08-17 PROCEDURE — G6012 RADIATION TREATMENT DELIVERY: HCPCS | Performed by: RADIOLOGY

## 2020-08-18 ENCOUNTER — APPOINTMENT (OUTPATIENT)
Dept: RADIATION ONCOLOGY | Facility: CLINIC | Age: 60
End: 2020-08-18
Payer: COMMERCIAL

## 2020-08-18 PROCEDURE — 77417 THER RADIOLOGY PORT IMAGE(S): CPT | Performed by: RADIOLOGY

## 2020-08-18 PROCEDURE — 77336 RADIATION PHYSICS CONSULT: CPT | Performed by: RADIOLOGY

## 2020-08-18 PROCEDURE — G6012 RADIATION TREATMENT DELIVERY: HCPCS | Performed by: RADIOLOGY

## 2020-08-18 PROCEDURE — 77427 RADIATION TX MANAGEMENT X5: CPT | Performed by: RADIOLOGY

## 2020-08-18 NOTE — PROGRESS NOTES
[facility] PHYSICIANS  SPECIALIZING IN BREAKTHROUGHS  Radiation Oncology    On Treatment Visit Note      Madison Garcia      Date: Aug 19, 2020   MRN: [id number]   : 1960  Diagnosis: breast cancer      Reason for Visit:  On Radiation Treatment Visit     Treatment Summary to Date  Treatment Site: right breast Current Dose: 2926/5256 cGy Fractions:       Chemotherapy  Chemo concurrent with radx?: No    Subjective:       Nursing ROS:   Nutrition Alteration  Diet Type: Patient's Preference  Skin  Skin Reaction: 1 - Faint erythema or dry desquamation  Skin Intervention: patient using hydrocortisone cream and Aquaphor BID        Cardiovascular  Respiratory effort: 1 - Normal - without distress        Psychosocial  Mood - Anxiety: 0 - Normal  Mood - Depression: 0 - Normal  Pyschosocial Note: slight fatigue per patient report  Pain Assessment  0-10 Pain Scale: 0      Objective:   /77 (BP Location: Left arm, Patient Position: Sitting, Cuff Size: Adult Regular)   Pulse 74   Temp 98.4  F (36.9  C) (Oral)   Resp 16   Wt 78.6 kg (173 lb 3.2 oz)   SpO2 100%   BMI 25.95 kg/m    Gen: Appears well, in no acute distress  Skin: Mild diffuse erythema over treatment field    Labs:  CBC RESULTS:   Recent Labs   Lab Test 20  1605   WBC 4.4   RBC 4.17   HGB 12.3   HCT 36.8   MCV 88   MCH 29.5   MCHC 33.4   RDW 12.1        ELECTROLYTES:  Recent Labs   Lab Test 20  1605      POTASSIUM 4.4   CHLORIDE 106   JANIS 9.2   CO2 27   BUN 12   CR 0.85   GLC 93       Assessment:    Tolerating radiation therapy well.  All questions and concerns addressed.    Toxicities:  Fatigue: Grade 1: Fatigue relieved by rest  Dermatitis: Grade 1: Faint erythema or dry desquamation    Plan:   1. Continue current therapy.        Mosaiq chart and setup information reviewed  Ports checked    Medication Review  Med list reviewed with patient?: Yes  Med list printed and given: Offered and  declined    Educational Topic Discussed  Education Instructions: radiation therapy side effects: fatigue, skin changes and skin cares    John Pacheco MD  Radiation Oncology   [facility]: [phone]

## 2020-08-19 ENCOUNTER — APPOINTMENT (OUTPATIENT)
Dept: RADIATION ONCOLOGY | Facility: CLINIC | Age: 60
End: 2020-08-19
Payer: COMMERCIAL

## 2020-08-19 ENCOUNTER — OFFICE VISIT (OUTPATIENT)
Dept: RADIATION ONCOLOGY | Facility: CLINIC | Age: 60
End: 2020-08-19
Payer: COMMERCIAL

## 2020-08-19 VITALS
WEIGHT: 173.2 LBS | SYSTOLIC BLOOD PRESSURE: 119 MMHG | HEART RATE: 74 BPM | BODY MASS INDEX: 25.95 KG/M2 | DIASTOLIC BLOOD PRESSURE: 77 MMHG | OXYGEN SATURATION: 100 % | RESPIRATION RATE: 16 BRPM | TEMPERATURE: 98.4 F

## 2020-08-19 DIAGNOSIS — C50.211 MALIGNANT NEOPLASM OF UPPER-INNER QUADRANT OF RIGHT BREAST IN FEMALE, ESTROGEN RECEPTOR NEGATIVE (H): Primary | ICD-10-CM

## 2020-08-19 DIAGNOSIS — Z17.1 MALIGNANT NEOPLASM OF UPPER-INNER QUADRANT OF RIGHT BREAST IN FEMALE, ESTROGEN RECEPTOR NEGATIVE (H): Primary | ICD-10-CM

## 2020-08-19 PROCEDURE — G6012 RADIATION TREATMENT DELIVERY: HCPCS | Performed by: RADIOLOGY

## 2020-08-19 PROCEDURE — 99207 ZZC DROP WITH A PROCEDURE: CPT | Performed by: RADIOLOGY

## 2020-08-19 ASSESSMENT — PAIN SCALES - GENERAL: PAINLEVEL: NO PAIN (0)

## 2020-08-19 NOTE — LETTER
2020         RE: Madison Garcia  [address] [address] [place] [address]        Dear Colleague,    Thank you for referring your patient, Madison Garcia, to the [facility]. Please see a copy of my visit note below.    [facility] PHYSICIANS  SPECIALIZING IN BREAKTHROUGHS  Radiation Oncology    On Treatment Visit Note      Madison Garcia      Date: Aug 19, 2020   MRN: [id number]   : 1960  Diagnosis: breast cancer      Reason for Visit:  On Radiation Treatment Visit     Treatment Summary to Date  Treatment Site: right breast Current Dose: 2926/5256 cGy Fractions:       Chemotherapy  Chemo concurrent with radx?: No    Subjective:       Nursing ROS:   Nutrition Alteration  Diet Type: Patient's Preference  Skin  Skin Reaction: 1 - Faint erythema or dry desquamation  Skin Intervention: patient using hydrocortisone cream and Aquaphor BID        Cardiovascular  Respiratory effort: 1 - Normal - without distress        Psychosocial  Mood - Anxiety: 0 - Normal  Mood - Depression: 0 - Normal  Pyschosocial Note: slight fatigue per patient report  Pain Assessment  0-10 Pain Scale: 0      Objective:   /77 (BP Location: Left arm, Patient Position: Sitting, Cuff Size: Adult Regular)   Pulse 74   Temp 98.4  F (36.9  C) (Oral)   Resp 16   Wt 78.6 kg (173 lb 3.2 oz)   SpO2 100%   BMI 25.95 kg/m    Gen: Appears well, in no acute distress  Skin: Mild diffuse erythema over treatment field    Labs:  CBC RESULTS:   Recent Labs   Lab Test 20  1605   WBC 4.4   RBC 4.17   HGB 12.3   HCT 36.8   MCV 88   MCH 29.5   MCHC 33.4   RDW 12.1        ELECTROLYTES:  Recent Labs   Lab Test 20  1605      POTASSIUM 4.4   CHLORIDE 106   JANIS 9.2   CO2 27   BUN 12   CR 0.85   GLC 93       Assessment:    Tolerating radiation therapy well.  All questions and concerns addressed.    Toxicities:  Fatigue: Grade 1: Fatigue relieved by rest  Dermatitis: Grade 1: Faint  erythema or dry desquamation    Plan:   1. Continue current therapy.        Mosaiq chart and setup information reviewed  Ports checked    Medication Review  Med list reviewed with patient?: Yes  Med list printed and given: Offered and declined    Educational Topic Discussed  Education Instructions: radiation therapy side effects: fatigue, skin changes and skin cares    John Pacheco MD  Radiation Oncology   [facility]: [phone]        Again, thank you for allowing me to participate in the care of your patient.        Sincerely,        John Pacheco MD

## 2020-08-19 NOTE — PATIENT INSTRUCTIONS
Please contact [facility] RN with questions or concerns following today's appointment: [phone].    Thank you!

## 2020-08-20 ENCOUNTER — APPOINTMENT (OUTPATIENT)
Dept: RADIATION ONCOLOGY | Facility: CLINIC | Age: 60
End: 2020-08-20
Payer: COMMERCIAL

## 2020-08-20 PROCEDURE — G6012 RADIATION TREATMENT DELIVERY: HCPCS | Performed by: RADIOLOGY

## 2020-08-21 ENCOUNTER — APPOINTMENT (OUTPATIENT)
Dept: RADIATION ONCOLOGY | Facility: CLINIC | Age: 60
End: 2020-08-21
Payer: COMMERCIAL

## 2020-08-21 PROCEDURE — G6012 RADIATION TREATMENT DELIVERY: HCPCS | Performed by: RADIOLOGY

## 2020-08-24 ENCOUNTER — APPOINTMENT (OUTPATIENT)
Dept: RADIATION ONCOLOGY | Facility: CLINIC | Age: 60
End: 2020-08-24
Payer: COMMERCIAL

## 2020-08-24 PROCEDURE — 77334 RADIATION TREATMENT AID(S): CPT | Performed by: RADIOLOGY

## 2020-08-24 PROCEDURE — G6012 RADIATION TREATMENT DELIVERY: HCPCS | Performed by: RADIOLOGY

## 2020-08-24 PROCEDURE — 77307 TELETHX ISODOSE PLAN CPLX: CPT | Performed by: RADIOLOGY

## 2020-08-25 ENCOUNTER — APPOINTMENT (OUTPATIENT)
Dept: RADIATION ONCOLOGY | Facility: CLINIC | Age: 60
End: 2020-08-25
Payer: COMMERCIAL

## 2020-08-25 PROCEDURE — G6012 RADIATION TREATMENT DELIVERY: HCPCS | Performed by: RADIOLOGY

## 2020-08-25 PROCEDURE — 77280 THER RAD SIMULAJ FIELD SMPL: CPT | Performed by: RADIOLOGY

## 2020-08-25 PROCEDURE — 77427 RADIATION TX MANAGEMENT X5: CPT | Performed by: RADIOLOGY

## 2020-08-25 PROCEDURE — 77336 RADIATION PHYSICS CONSULT: CPT | Mod: 59 | Performed by: RADIOLOGY

## 2020-08-26 ENCOUNTER — APPOINTMENT (OUTPATIENT)
Dept: RADIATION ONCOLOGY | Facility: CLINIC | Age: 60
End: 2020-08-26
Payer: COMMERCIAL

## 2020-08-26 ENCOUNTER — OFFICE VISIT (OUTPATIENT)
Dept: RADIATION ONCOLOGY | Facility: CLINIC | Age: 60
End: 2020-08-26
Payer: COMMERCIAL

## 2020-08-26 VITALS
TEMPERATURE: 98.2 F | WEIGHT: 173 LBS | DIASTOLIC BLOOD PRESSURE: 82 MMHG | HEART RATE: 74 BPM | BODY MASS INDEX: 25.92 KG/M2 | OXYGEN SATURATION: 99 % | SYSTOLIC BLOOD PRESSURE: 119 MMHG

## 2020-08-26 DIAGNOSIS — C50.211 MALIGNANT NEOPLASM OF UPPER-INNER QUADRANT OF RIGHT BREAST IN FEMALE, ESTROGEN RECEPTOR NEGATIVE (H): Primary | ICD-10-CM

## 2020-08-26 DIAGNOSIS — Z17.1 MALIGNANT NEOPLASM OF UPPER-INNER QUADRANT OF RIGHT BREAST IN FEMALE, ESTROGEN RECEPTOR NEGATIVE (H): Primary | ICD-10-CM

## 2020-08-26 PROCEDURE — G6012 RADIATION TREATMENT DELIVERY: HCPCS | Performed by: RADIOLOGY

## 2020-08-26 PROCEDURE — 99207 ZZC DROP WITH A PROCEDURE: CPT | Performed by: RADIOLOGY

## 2020-08-26 ASSESSMENT — PAIN SCALES - GENERAL: PAINLEVEL: NO PAIN (0)

## 2020-08-26 NOTE — PATIENT INSTRUCTIONS
Please contact [facility] RN with questions or concerns following today's appointment: [phone].    Thank you!

## 2020-08-26 NOTE — PROGRESS NOTES
[facility] PHYSICIANS  SPECIALIZING IN BREAKTHROUGHS  Radiation Oncology    On Treatment Visit Note      Madison Garcia      Date: 2020   MRN: [id number]   : 1960  Diagnosis: breast cancer      Reason for Visit:  On Radiation Treatment Visit     Treatment Summary to Date  Treatment Site: right breast Current Dose: 4256/5256 cGy Fractions:       Chemotherapy  Chemo concurrent with radx?: No    Subjective:     Has subtle skin changes within radiation treatment field.  Otherwise no complaints    Nursing ROS:   Nutrition Alteration  Diet Type: Patient's Preference  Skin  Skin Reaction: 1 - Faint erythema or dry desquamation  Skin Intervention: patient using Hydrocortisone 1% cream two times daily and Aquaphor two times daily  Skin Note: Dr. Santillan to provide guidance on use of Hydrocortisone cream and when to be discontinued        Cardiovascular  Respiratory effort: 1 - Normal - without distress        Psychosocial  Mood - Anxiety: 0 - Normal  Mood - Depression: 0 - Normal  Pyschosocial Note: slight fatigue per patient report  Pain Assessment  0-10 Pain Scale: 0      Objective:   /82 (BP Location: Left arm, Patient Position: Chair, Cuff Size: Adult Regular)   Pulse 74   Temp 98.2  F (36.8  C) (Oral)   Wt 78.5 kg (173 lb)   SpO2 99%   BMI 25.92 kg/m    Gen: Appears well, in no acute distress  Skin: Minimal diffuse erythema over treatment field    Labs:  CBC RESULTS:   Recent Labs   Lab Test 20  1605   WBC 4.4   RBC 4.17   HGB 12.3   HCT 36.8   MCV 88   MCH 29.5   MCHC 33.4   RDW 12.1        ELECTROLYTES:  Recent Labs   Lab Test 20  1605      POTASSIUM 4.4   CHLORIDE 106   JANIS 9.2   CO2 27   BUN 12   CR 0.85   GLC 93       Assessment:    Tolerating radiation therapy well.  All questions and concerns addressed.    Toxicities:  Fatigue: Grade 0: No toxicity  Dermatitis: Grade 1: Faint erythema or dry desquamation    Plan:   1. Continue current therapy.     2. Recommend continued use of hydrocortisone ointment 1% twice daily for 1 week after she completes radiation therapy.  Then stop.  After completing treatment she can continue to use daily or twice daily Aquaphor for 3 to 4 weeks then transition to using moisturizing cream    Mosaiq chart and setup information reviewed  Ports checked    Medication Review  Med list reviewed with patient?: Yes  Med list printed and given: Offered and declined    Educational Topic Discussed  Additional Instructions: follow-up with radiation clinic in one month, scheduling to contact patient  Education Instructions: mammogram scheduled 1/18/2021 and follow-up with Dr. Slater scheduled 1/21/2021        Cassandra Santillan MD    Please do not send letter to referring physician.

## 2020-08-26 NOTE — LETTER
2020         RE: Madison Garcia  [address] [address] [place] [id number]        Dear Colleague,    Thank you for referring your patient, Madison Garcia, to the [facility]. Please see a copy of my visit note below.    [facility] PHYSICIANS  SPECIALIZING IN BREAKTHROUGHS  Radiation Oncology    On Treatment Visit Note      Madison Garcia      Date: 2020   MRN: [id number]   : 1960  Diagnosis: breast cancer      Reason for Visit:  On Radiation Treatment Visit     Treatment Summary to Date  Treatment Site: right breast Current Dose: 4256/5256 cGy Fractions:       Chemotherapy  Chemo concurrent with radx?: No    Subjective:     Has subtle skin changes within radiation treatment field.  Otherwise no complaints    Nursing ROS:   Nutrition Alteration  Diet Type: Patient's Preference  Skin  Skin Reaction: 1 - Faint erythema or dry desquamation  Skin Intervention: patient using Hydrocortisone 1% cream two times daily and Aquaphor two times daily  Skin Note: Dr. Santillan to provide guidance on use of Hydrocortisone cream and when to be discontinued        Cardiovascular  Respiratory effort: 1 - Normal - without distress        Psychosocial  Mood - Anxiety: 0 - Normal  Mood - Depression: 0 - Normal  Pyschosocial Note: slight fatigue per patient report  Pain Assessment  0-10 Pain Scale: 0      Objective:   /82 (BP Location: Left arm, Patient Position: Chair, Cuff Size: Adult Regular)   Pulse 74   Temp 98.2  F (36.8  C) (Oral)   Wt 78.5 kg (173 lb)   SpO2 99%   BMI 25.92 kg/m    Gen: Appears well, in no acute distress  Skin: Minimal diffuse erythema over treatment field    Labs:  CBC RESULTS:   Recent Labs   Lab Test 20  1605   WBC 4.4   RBC 4.17   HGB 12.3   HCT 36.8   MCV 88   MCH 29.5   MCHC 33.4   RDW 12.1        ELECTROLYTES:  Recent Labs   Lab Test 20  1605      POTASSIUM 4.4   CHLORIDE 106   JANIS 9.2   CO2 27   BUN 12   CR 0.85    GLC 93       Assessment:    Tolerating radiation therapy well.  All questions and concerns addressed.    Toxicities:  Fatigue: Grade 0: No toxicity  Dermatitis: Grade 1: Faint erythema or dry desquamation    Plan:   1. Continue current therapy.    2. Recommend continued use of hydrocortisone ointment 1% twice daily for 1 week after she completes radiation therapy.  Then stop.  After completing treatment she can continue to use daily or twice daily Aquaphor for 3 to 4 weeks then transition to using moisturizing cream    Mosaiq chart and setup information reviewed  Ports checked    Medication Review  Med list reviewed with patient?: Yes  Med list printed and given: Offered and declined    Educational Topic Discussed  Additional Instructions: follow-up with radiation clinic in one month, scheduling to contact patient  Education Instructions: mammogram scheduled 1/18/2021 and follow-up with Dr. Slater scheduled 1/21/2021        Cassandra Santillan MD    Please do not send letter to referring physician.        Again, thank you for allowing me to participate in the care of your patient.        Sincerely,        Cassandra Santillan MD

## 2020-08-27 ENCOUNTER — APPOINTMENT (OUTPATIENT)
Dept: RADIATION ONCOLOGY | Facility: CLINIC | Age: 60
End: 2020-08-27
Payer: COMMERCIAL

## 2020-08-27 PROCEDURE — G6012 RADIATION TREATMENT DELIVERY: HCPCS | Performed by: RADIOLOGY

## 2020-08-28 ENCOUNTER — TELEPHONE (OUTPATIENT)
Dept: RADIATION ONCOLOGY | Facility: CLINIC | Age: 60
End: 2020-08-28

## 2020-08-28 ENCOUNTER — APPOINTMENT (OUTPATIENT)
Dept: RADIATION ONCOLOGY | Facility: CLINIC | Age: 60
End: 2020-08-28
Payer: COMMERCIAL

## 2020-08-28 PROCEDURE — G6012 RADIATION TREATMENT DELIVERY: HCPCS | Performed by: RADIOLOGY

## 2020-08-28 NOTE — TELEPHONE ENCOUNTER
Patient contacted and scheduled for a 1 month video visit follow up with Dr. Pacheco on 10/09/2020 at 1300.      Rere Kelly CMA

## 2020-08-31 ENCOUNTER — APPOINTMENT (OUTPATIENT)
Dept: RADIATION ONCOLOGY | Facility: CLINIC | Age: 60
End: 2020-08-31
Payer: COMMERCIAL

## 2020-08-31 PROCEDURE — G6012 RADIATION TREATMENT DELIVERY: HCPCS | Performed by: RADIOLOGY

## 2020-09-01 ENCOUNTER — APPOINTMENT (OUTPATIENT)
Dept: RADIATION ONCOLOGY | Facility: CLINIC | Age: 60
End: 2020-09-01
Payer: COMMERCIAL

## 2020-09-01 PROCEDURE — 77336 RADIATION PHYSICS CONSULT: CPT | Performed by: RADIOLOGY

## 2020-09-01 PROCEDURE — 77427 RADIATION TX MANAGEMENT X5: CPT | Performed by: RADIOLOGY

## 2020-09-01 PROCEDURE — G6012 RADIATION TREATMENT DELIVERY: HCPCS | Performed by: RADIOLOGY

## 2020-09-04 ENCOUNTER — ONCOLOGY VISIT (OUTPATIENT)
Dept: RADIATION ONCOLOGY | Facility: CLINIC | Age: 60
End: 2020-09-04

## 2020-09-05 NOTE — PROCEDURES
Radiotherapy Treatment Summary          Date of Report: 2020     PATIENT: CHIARA HANKS  MEDICAL RECORD NO: [id number]  : 1960     DIAGNOSIS: C50.211 Malignant neoplasm of upper-inner quadrant of right female breast  INTENT OF RADIOTHERAPY: Cure  PATHOLOGY/STAGE:   This is a 60 year old woman with right UIQ breast ER/MD- G3 DCIS s/p  lumpectomy on   2020.  She completed radiation therapy as stated below.                      Details of the treatments summarized below are found in records kept in the Department of Radiation Oncology at [facility].     Treatment Summary:  Radiation Oncology - Course: 1    Treatment Site        Dose                 Modality From To Elapsed Days Fx.  1 Rt Breast           4,256 cGy    6X/10X  8/05/2020  2020  21 16  1 Rt Breast Bst    1,000 cGy     06 X  8/27/2020  2020   5  4                   Dose per Fraction:        Total Dose:     Rt breast  266 cGy     4,265 cGy  Rt breast boost  250 cGy     5,265 cGy           COMMENTS:                      Patient experienced grade 1 skin reaction managed well with moisturizer and hydrocortisone cream.  Patient   experienced mild fatigue related to radiation therapy.     PAIN MANAGEMENT:      Patient did not require any pain medications.                          FOLLOW UP PLAN:  Patient will follow up in Radiation Oncology in 1 month or sooner if any concerns.  Patient has an appointment   with surgeon, Dr. Slater scheduled 2021.                      Staff Physician: Cassandra Santillan M.D.  Physicist: Ariana Hall MS     CC:   Radha Huerta MD              Radiation Oncology [id number] [place] [address] Phone: [phone]

## 2020-10-09 ENCOUNTER — VIRTUAL VISIT (OUTPATIENT)
Dept: RADIATION ONCOLOGY | Facility: CLINIC | Age: 60
End: 2020-10-09
Payer: COMMERCIAL

## 2020-10-09 DIAGNOSIS — Z17.1 MALIGNANT NEOPLASM OF UPPER-INNER QUADRANT OF RIGHT BREAST IN FEMALE, ESTROGEN RECEPTOR NEGATIVE (H): Primary | ICD-10-CM

## 2020-10-09 DIAGNOSIS — C50.211 MALIGNANT NEOPLASM OF UPPER-INNER QUADRANT OF RIGHT BREAST IN FEMALE, ESTROGEN RECEPTOR NEGATIVE (H): Primary | ICD-10-CM

## 2020-10-09 DIAGNOSIS — D05.11 DUCTAL CARCINOMA IN SITU (DCIS) OF RIGHT BREAST: ICD-10-CM

## 2020-10-09 PROCEDURE — 99024 POSTOP FOLLOW-UP VISIT: CPT | Performed by: RADIOLOGY

## 2020-10-09 NOTE — PATIENT INSTRUCTIONS
Please contact [facility] RN with questions or concerns following today's appointment: [phone].    Thank you!

## 2020-10-09 NOTE — PROGRESS NOTES
[facility]    DEPARTMENT OF RADIATION ONCOLOGY  TELEPHONE FOLLOW-UP NOTE       Oct 9, 2020    Madison Garcia   MRN: [id number]  : 1960     DISEASE TREATED:  DIAGNOSIS: C50.211 Malignant neoplasm of upper-inner quadrant of right female breast    INTENT OF RADIOTHERAPY: Cure    PATHOLOGY/STAGE:   Madison Garcia is a 60 year old woman with G3 DCIS of the UIQ of the right breast, ER/MO negative, s/p  lumpectomy on 2020.     INTERVAL SINCE COMPLETION OF RADIATION THERAPY: 5-1/2 weeks; completed treatment on 2020..     TYPE OF RADIATION THERAPY ADMINISTERED: The patient received 4256 cGy in 16 fractions delivered over 21 days to the right breast, followed by a 1000 cGy boost to the right breast tumor bed delivered in 4 fractions over 5 days, for a total dose of 5265 cGy in 20 treatment fractions delivered over 26 days, with radiation therapy delivered between 2020 and 2020.     SUBJECTIVE: Ms. Garcia is seen today for a video followup visit.  However the audio component was not working properly, and I converted the visit to a telephone follow-up.  She is currently doing well.  During the course of radiation therapy she experienced mild grade 1 right breast skin reaction that was managed well with moisturizer and hydrocortisone cream.  She experienced mild fatigue related to radiation therapy.  She states her skin is healed and her fatigue is improving.  She feels her energy level is nearly normal.  She has short episodes of occasional discomfort in the right breast, 1-2 times per week.  She denies lymphedema.  Her range of motion is good.  She continues to use a moisturizing cream on her skin.     OBJECTIVE:   There were no vitals taken for this visit.     PHYSICAL EXAM: N/A    ASSESSMENT:  Ms. Garcia is doing well 5-1/2 weeks following completion of right breast irradiation.                                                  FOLLOW UP PLAN:  1.  Ultrasound and mammogram  1/18/2021  2.  Follow-up with Dr. Slater 1/21/2021.  3.  Follow-up with Dr. Huerta as needed.  4.  Follow-up Radiation Oncology in March 2021.    Thank you again for the opportunity to participate in Madison Garcia's care.  If any additional questions arise please do not hesitate to contact me.    John Pacheco MD  Department of Radiation Oncology

## 2020-10-09 NOTE — PROGRESS NOTES
"Madison Garcia is a 60 year old female who is being evaluated via a billable video visit.      The patient has been notified of following:     \"This video visit will be conducted via a call between you and your physician/provider. We have found that certain health care needs can be provided without the need for an in-person physical exam.  This service lets us provide the care you need with a video conversation.  If a prescription is necessary we can send it directly to your pharmacy.  If lab work is needed we can place an order for that and you can then stop by our lab to have the test done at a later time.    Video visits are billed at different rates depending on your insurance coverage.  Please reach out to your insurance provider with any questions.    If during the course of the call the physician/provider feels a video visit is not appropriate, you will not be charged for this service.\"    Patient has given verbal consent for Video visit? Yes  How would you like to obtain your AVS? MyChart  If you are dropped from the video visit, the video invite should be resent to: Text to cell phone: [phone]  Will anyone else be joining your video visit? No        Video-Visit Details    Type of service:  Video Visit    Video Start Time: 1:18 PM  Video End Time:     Originating Location (pt. Location): Home    Distant Location (provider location):  [facility] RADIATION ONCOLOGY [place]     Platform used for Video Visit: [facility]    FOLLOW-UP VISIT    Patient Name: Madison Garcia      : 1960     Age: 60 year old        ______________________________________________________________________________     Chief Complaint   Patient presents with     Radiation Therapy     Video return appointment with Dr. Pacheco     There were no vitals taken for this visit.     Date Radiation Completed: 2020    Pain  Patient reports occasional breast pain, denies need for medication    Meds  Current Med List Reviewed: " Yes  Medication Note:     Skin: Warm  Dry  Intact    Range of Motion: Full    Respiratory: No shortness of breath, dyspnea on exertion, cough, or hemoptysis    Hormone Therapy: No    Lymphedema Follow up: No    Energy Level: Improving      Appointments:     DATE  Oncologist: Dr. Bradley NOGUERA   Surgeon: Dr. Slater  1/21/2021   Primary:      Other Notes: Next mammogram and ultrasound on 1/18/2021. Follow up with Dr. Santillan in 6 months.     Hanh Grant RN

## 2020-11-06 DIAGNOSIS — F41.9 ANXIETY: ICD-10-CM

## 2020-11-06 RX ORDER — ALPRAZOLAM 1 MG
TABLET ORAL
Qty: 30 TABLET | Refills: 2 | Status: SHIPPED | OUTPATIENT
Start: 2020-11-06 | End: 2020-12-10

## 2020-11-06 NOTE — TELEPHONE ENCOUNTER
Alprazolam 1mg       Last Written Prescription Date:  8/12/2020   Last Fill Quantity: 90,   # refills: 0  Last Office Visit: 6/29/2020  Future Office visit:    Next 5 appointments (look out 90 days)    Dec 15, 2020  7:00 AM  PHYSICAL with Tera Keith MD  [facility]) [address] [facility] [id number]  [phone]   Jan 21, 2021 10:30 AM  Return Visit with Elise Slater MD  [facility] ([facility]) [address] [place] [address]  [phone]         Last [facility] website verification 8/12/2020 [place]   [email or web address]  Routing refill request to provider for review/approval because:  Drug not on the [facility], [facility] or [facility] refill protocol or controlled substance    Ni MANJARREZ RN

## 2020-12-08 DIAGNOSIS — Z11.59 ENCOUNTER FOR SCREENING FOR OTHER VIRAL DISEASES: Primary | ICD-10-CM

## 2020-12-10 ENCOUNTER — OFFICE VISIT (OUTPATIENT)
Dept: FAMILY MEDICINE | Facility: CLINIC | Age: 60
End: 2020-12-10
Payer: COMMERCIAL

## 2020-12-10 VITALS
HEIGHT: 69 IN | TEMPERATURE: 98 F | WEIGHT: 173 LBS | HEART RATE: 80 BPM | SYSTOLIC BLOOD PRESSURE: 110 MMHG | DIASTOLIC BLOOD PRESSURE: 76 MMHG | OXYGEN SATURATION: 100 % | BODY MASS INDEX: 25.62 KG/M2

## 2020-12-10 DIAGNOSIS — I25.83 CORONARY ARTERY DISEASE DUE TO LIPID RICH PLAQUE: ICD-10-CM

## 2020-12-10 DIAGNOSIS — I25.10 ASCVD (ARTERIOSCLEROTIC CARDIOVASCULAR DISEASE): Chronic | ICD-10-CM

## 2020-12-10 DIAGNOSIS — R79.89 ELEVATED TSH: ICD-10-CM

## 2020-12-10 DIAGNOSIS — I25.10 CORONARY ARTERY DISEASE DUE TO LIPID RICH PLAQUE: ICD-10-CM

## 2020-12-10 DIAGNOSIS — E78.5 HYPERLIPIDEMIA LDL GOAL <100: Chronic | ICD-10-CM

## 2020-12-10 DIAGNOSIS — F33.41 RECURRENT MAJOR DEPRESSIVE DISORDER, IN PARTIAL REMISSION (H): Chronic | ICD-10-CM

## 2020-12-10 DIAGNOSIS — C50.211 MALIGNANT NEOPLASM OF UPPER-INNER QUADRANT OF RIGHT BREAST IN FEMALE, ESTROGEN RECEPTOR NEGATIVE (H): ICD-10-CM

## 2020-12-10 DIAGNOSIS — Z00.00 ROUTINE GENERAL MEDICAL EXAMINATION AT A HEALTH CARE FACILITY: Primary | ICD-10-CM

## 2020-12-10 DIAGNOSIS — K63.5 POLYP OF COLON, UNSPECIFIED PART OF COLON, UNSPECIFIED TYPE: ICD-10-CM

## 2020-12-10 DIAGNOSIS — Z17.1 MALIGNANT NEOPLASM OF UPPER-INNER QUADRANT OF RIGHT BREAST IN FEMALE, ESTROGEN RECEPTOR NEGATIVE (H): ICD-10-CM

## 2020-12-10 DIAGNOSIS — M25.561 RIGHT KNEE PAIN, UNSPECIFIED CHRONICITY: ICD-10-CM

## 2020-12-10 DIAGNOSIS — G47.00 INSOMNIA, UNSPECIFIED TYPE: Chronic | ICD-10-CM

## 2020-12-10 DIAGNOSIS — F41.9 ANXIETY: ICD-10-CM

## 2020-12-10 LAB
ALBUMIN SERPL-MCNC: 3.5 G/DL (ref 3.4–5)
ALP SERPL-CCNC: 64 U/L (ref 40–150)
ALT SERPL W P-5'-P-CCNC: 31 U/L (ref 0–50)
ANION GAP SERPL CALCULATED.3IONS-SCNC: 5 MMOL/L (ref 3–14)
AST SERPL W P-5'-P-CCNC: 22 U/L (ref 0–45)
BILIRUB SERPL-MCNC: 0.4 MG/DL (ref 0.2–1.3)
BUN SERPL-MCNC: 16 MG/DL (ref 7–30)
CALCIUM SERPL-MCNC: 9.1 MG/DL (ref 8.5–10.1)
CHLORIDE SERPL-SCNC: 106 MMOL/L (ref 94–109)
CHOLEST SERPL-MCNC: 146 MG/DL
CO2 SERPL-SCNC: 28 MMOL/L (ref 20–32)
CREAT SERPL-MCNC: 0.95 MG/DL (ref 0.52–1.04)
ERYTHROCYTE [DISTWIDTH] IN BLOOD BY AUTOMATED COUNT: 12.2 % (ref 10–15)
GFR SERPL CREATININE-BSD FRML MDRD: 66 ML/MIN/{1.73_M2}
GLUCOSE SERPL-MCNC: 89 MG/DL (ref 70–99)
HCT VFR BLD AUTO: 36.5 % (ref 35–47)
HDLC SERPL-MCNC: 49 MG/DL
HGB BLD-MCNC: 12.3 G/DL (ref 11.7–15.7)
LDLC SERPL CALC-MCNC: 76 MG/DL
MCH RBC QN AUTO: 30.1 PG (ref 26.5–33)
MCHC RBC AUTO-ENTMCNC: 33.7 G/DL (ref 31.5–36.5)
MCV RBC AUTO: 89 FL (ref 78–100)
NONHDLC SERPL-MCNC: 97 MG/DL
PLATELET # BLD AUTO: 279 10E9/L (ref 150–450)
POTASSIUM SERPL-SCNC: 4.5 MMOL/L (ref 3.4–5.3)
PROT SERPL-MCNC: 6.9 G/DL (ref 6.8–8.8)
RBC # BLD AUTO: 4.09 10E12/L (ref 3.8–5.2)
SODIUM SERPL-SCNC: 139 MMOL/L (ref 133–144)
T4 FREE SERPL-MCNC: 0.97 NG/DL (ref 0.76–1.46)
TRIGL SERPL-MCNC: 105 MG/DL
TSH SERPL DL<=0.005 MIU/L-ACNC: 6.14 MU/L (ref 0.4–4)
WBC # BLD AUTO: 3.8 10E9/L (ref 4–11)

## 2020-12-10 PROCEDURE — 36415 COLL VENOUS BLD VENIPUNCTURE: CPT | Performed by: INTERNAL MEDICINE

## 2020-12-10 PROCEDURE — 85027 COMPLETE CBC AUTOMATED: CPT | Performed by: INTERNAL MEDICINE

## 2020-12-10 PROCEDURE — 84443 ASSAY THYROID STIM HORMONE: CPT | Performed by: INTERNAL MEDICINE

## 2020-12-10 PROCEDURE — 80053 COMPREHEN METABOLIC PANEL: CPT | Performed by: INTERNAL MEDICINE

## 2020-12-10 PROCEDURE — 99396 PREV VISIT EST AGE 40-64: CPT | Performed by: INTERNAL MEDICINE

## 2020-12-10 PROCEDURE — 80061 LIPID PANEL: CPT | Performed by: INTERNAL MEDICINE

## 2020-12-10 PROCEDURE — 84439 ASSAY OF FREE THYROXINE: CPT | Performed by: INTERNAL MEDICINE

## 2020-12-10 RX ORDER — ALPRAZOLAM 1 MG
TABLET ORAL
Qty: 60 TABLET | Refills: 0 | Status: SHIPPED | OUTPATIENT
Start: 2020-12-10 | End: 2021-02-07

## 2020-12-10 RX ORDER — BUPROPION HYDROCHLORIDE 300 MG/1
TABLET ORAL
Qty: 90 TABLET | Refills: 3 | Status: SHIPPED | OUTPATIENT
Start: 2020-12-10 | End: 2021-12-06

## 2020-12-10 RX ORDER — ROSUVASTATIN CALCIUM 20 MG/1
20 TABLET, COATED ORAL DAILY
Qty: 90 TABLET | Refills: 3 | Status: SHIPPED | OUTPATIENT
Start: 2020-12-10 | End: 2022-01-04

## 2020-12-10 RX ORDER — BUPROPION HYDROCHLORIDE 150 MG/1
TABLET ORAL
Qty: 90 TABLET | Refills: 3 | Status: SHIPPED | OUTPATIENT
Start: 2020-12-10 | End: 2021-12-06

## 2020-12-10 ASSESSMENT — PATIENT HEALTH QUESTIONNAIRE - PHQ9: SUM OF ALL RESPONSES TO PHQ QUESTIONS 1-9: 0

## 2020-12-10 ASSESSMENT — MIFFLIN-ST. JEOR: SCORE: 1429.1

## 2020-12-10 NOTE — PATIENT INSTRUCTIONS
I would recommend getting the new shingles shot called shingrix, but I would do it at your pharmacy as they can check with the insurance company to see if it is paid for.    For the knee I would see Dr Davie Phillips at [facility] Jonas Keith M.D.

## 2020-12-10 NOTE — RESULT ENCOUNTER NOTE
It was a pleasure seeing you for your physical examination.  I wanted to get back to you with your test results.  I have enclosed a copy for your review.     I am happy to report that your cbc or complete blood count is normal with no signs of anemia, leukemia or platelet abnormalities. Your chemistry panel shows no signs of diabetes.  Your blood salts, kidney tests, liver tests, and proteins are all fine.    Your total cholesterol is 146 with the normal range being below 200.  Your HDL or good cholesterol is 49 with the normal range being above 50.  Your LDL or bad cholesterol is 76 with the normal range being below 130.  These numbers are very good.    The thyroid test or tsh is just barely off and not much changed from last year.  Given this and the normal t4 nothing needs to be done except to check it yearly.    I am happy to bring you this overall excellent report.  If you have any questions please call me.    Tera Keith M.D.

## 2020-12-10 NOTE — PROGRESS NOTES
SUBJECTIVE:   CC: Madison Garcia is an 60 year old woman who presents for preventive health visit.     She is doing well and to get pap and colon soon.     She has had dcis with lumpectomy and xrt and reg surgical follow up.    She has had right knee pain off and on for long time.    She notes depression doing fairly well, exercising less than before with covid.  No c/o.          Patient has been advised of split billing requirements and indicates understanding: Yes  Healthy Habits:     Getting at least 3 servings of Calcium per day:  Yes    Bi-annual eye exam:  Yes    Dental care twice a year:  Yes    Sleep apnea or symptoms of sleep apnea:  None    Diet:  Vegetarian/vegan    Frequency of exercise:  2-3 days/week    Duration of exercise:  Other    Taking medications regularly:  Yes    Medication side effects:  None    PHQ-2 Total Score: 1    Additional concerns today:  No              Today's PHQ-2 Score:   PHQ-2 ( 1999 [employer]) 12/9/2020   Q1: Little interest or pleasure in doing things 0   Q2: Feeling down, depressed or hopeless 1   PHQ-2 Score 1   Q1: Little interest or pleasure in doing things Not at all   Q2: Feeling down, depressed or hopeless Several days   PHQ-2 Score 1       Abuse: Current or Past (Physical, Sexual or Emotional) - No  Do you feel safe in your environment? Yes        Social History     Tobacco Use     Smoking status: Never Smoker     Smokeless tobacco: Never Used   Substance Use Topics     Alcohol use: Yes     Alcohol/week: 0.0 standard drinks     Comment: 1-2/week      If you drink alcohol do you typically have >3 drinks per day or >7 drinks per week? No               Past Medical History:      Past Medical History:   Diagnosis Date     Abdominal pain 2008 and 2010    ct neg 2008, ovar us 2010 with fallopian cyst and fibroids     Anxiety and depression 90's     ASCVD (arteriosclerotic cardiovascular disease) 12/2012    pos est, ct angio and cards fu, 25-49% lad     Chest tightness  12/2019    neg est echo     Chronic cystitis     Dr. Mcgrath     Chronic insomnia     for years, seen in sleep clinic 2016     Colonic polyp fu nl 2008    fu due 2013, fu done 2014 and nl, to fu 2019     [place] (dyspnea on exertion)     est echo nl 2015, ct chest and pulm eval by Dr. Quiroga and no cause found     Ductal carcinoma in situ (DCIS) of right breast 2020    lumpectomy and xrt     Elevated TSH 11/2017     Hyperlipidaemia      Leg pain 2014    stopped lipitor     Lung nodules 01/2012    seen on coronary calcium ct, fu 1 year, fu done 12/12 and likely benign, fu done 2016 and no change, Dr. Quiroga     MVP (mitral valve prolapse) 2013    Had it surgically repaired, Robotic, done [place] 6/13 seen by cardiology 2011, echo 2007 with mild mr and nl lv fxn, fu echo 12/11 with lv size upper limits of nl, nl ef, no wma, mild lae, mvp and mild mr present     Palpitations      Screening 01/2012    cor ct score 0             Past Surgical History:      Past Surgical History:   Procedure Laterality Date     HERNIA REPAIR       LUMPECTOMY BREAST WITH SEED LOCALIZATION Right 7/8/2020    Procedure: SEED LOCALIZED RIGHT BREAST LUMPECTOMY;  Surgeon: Elise Slater MD;  Location: [facility] OR     REPAIR VALVE MITRAL  6/13    done at [place], robotic             Social History:     Social History     Socioeconomic History     Marital status:      Spouse name: Not on file     Number of children: 2     Years of education: Not on file     Highest education level: Not on file   Occupational History     Occupation:      Employer: [employer]     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   Social Needs     Financial resource strain: Not on file     Food insecurity     Worry: Not on file     Inability: Not on file     Transportation needs     Medical: Not on file     Non-medical: Not on file   Tobacco Use     Smoking status: Never Smoker     Smokeless tobacco: Never Used   Substance and Sexual Activity     Alcohol use:  Yes     Alcohol/week: 0.0 standard drinks     Comment: 1-2/week      Drug use: No     Sexual activity: Yes     Partners: Male     Birth control/protection: Condom   Lifestyle     Physical activity     Days per week: Not on file     Minutes per session: Not on file     Stress: Not on file   Relationships     Social connections     Talks on phone: Not on file     Gets together: Not on file     Attends [employer] service: Not on file     Active member of club or organization: Not on file     Attends meetings of clubs or organizations: Not on file     Relationship status: Not on file     Intimate partner violence     Fear of current or ex partner: Not on file     Emotionally abused: Not on file     Physically abused: Not on file     Forced sexual activity: Not on file   Other Topics Concern      Service Not Asked     Blood Transfusions Not Asked     Caffeine Concern Yes     Comment: 2 cans of pop per day     Occupational Exposure Not Asked     Hobby Hazards Not Asked     Sleep Concern Not Asked     Stress Concern Not Asked     Weight Concern Not Asked     Special Diet Yes     Comment: vegetarian     Back Care Not Asked     Exercise Yes     Comment: 3x per week      Bike Helmet Not Asked     Seat Belt Not Asked     Self-Exams Not Asked     Parent/sibling w/ CABG, MI or angioplasty before 65F 55M? Not Asked   Social History Narrative     Not on file             Family History:   reviewed         Allergies:     Allergies   Allergen Reactions     Albumin (Human) Difficulty breathing     Atorvastatin Muscle Pain (Myalgia)     Erythromycin      GI upset, intolerance             Medications:     Current Outpatient Medications   Medication Sig Dispense Refill     ALPRAZolam (XANAX) 1 MG tablet TAKE 1 TABLET BY MOUTH EVERY DAY AT BEDTIME AS NEEDED FOR ANXIETY 60 tablet 0     amoxicillin (AMOXIL) 500 MG tablet Take 2,000 mg by mouth daily as needed (prior to dental appointments)       aspirin 81 MG tablet Take 1 tablet by  "mouth daily. 1 tablet 3     buPROPion (WELLBUTRIN XL) 150 MG 24 hr tablet Take 1 of the 150mg along with one of the 300mg tablets for total daily dose of 450mg 90 tablet 3     buPROPion (WELLBUTRIN XL) 300 MG 24 hr tablet TAKE 1 TABLET BY MOUTH EVERY DAY ALONG WITH THE 150MG TABLET 90 tablet 3     diphenhydrAMINE-APAP, sleep, (TYLENOL PM EXTRA STRENGTH PO) Take 2 tablets by mouth every evening        ibuprofen (ADVIL) 200 MG tablet Take 400 mg by mouth every 4 hours as needed       Ibuprofen-Diphenhydramine Cit (IBUPROFEN PM PO) Take 2 tablets by mouth every evening        melatonin 5 MG CAPS Take 6-7.5 mg by mouth At Bedtime        Multiple Vitamin (DAILY MULTIVITAMIN PO) Take 1 tablet by mouth daily.       rosuvastatin (CRESTOR) 20 MG tablet Take 1 tablet (20 mg) by mouth daily 90 tablet 3               Review of Systems:   The 10 point Review of Systems is negative other than noted in the HPI           Physical Exam:   Blood pressure 110/76, pulse 80, temperature 98  F (36.7  C), temperature source Oral, height 1.753 m (5' 9\"), weight 78.5 kg (173 lb), SpO2 100 %, not currently breastfeeding.    Exam:  Constitutional: healthy appearing, alert and in no distress  Heent: Normocephalic. Head without obvious masses or lesions. PERRLDC, EOMI. Mouth exam within normal limits: tongue, mucous membranes, posterior pharynx all normal, no lesions or abnormalities seen.  Tm's and canals within normal limits bilaterally. Neck supple, no nuchal rigidity or masses. No supraclavicular, or cervical adenopathy. Thyroid symmetric, no masses.  Cardiovascular: Regular rate and rhythm, no murmer, rub or gallops.  JVP not elevated, no edema.  Carotids within normal limits bilaterally, no bruits.  Respiratory: Normal respiratory effort.  Lungs clear, normal flow, no wheezing or crackles.  Gastrointestinal: Normal active bowel sounds.   Soft, not tender, no masses, guarding or rebound.  No hepatosplenomegaly.   Musculoskeletal: " extremities normal, no gross deformities noted.  Skin: no suspicious lesions or rashes   Neurologic: Mental status within normal limits.  Speech fluent.  No gross motor abnormalities and gait intact.  Psychiatric: mentation appears normal and affect normal.         Data:   Labs sent        Assessment:   1. Normal complete physical exam  2. Cad, med mgmt  3. Elevated cholesterol, on statin  4. Dcis, follow up surgery  5. Depression/anxiety/insomnia stable, using xanax nightly for years and no issues or signs of abuse  6. Colon polyp, to get follow up soon  7. Elevated tsh, follow up labs  8. Right knee pain, to ortho  9. hcm         Plan:   shingrix at pharm  Follow up surgery  Follow up ortho  Exercise, diet  Letter with labs      Tera Keith M.D.

## 2020-12-17 DIAGNOSIS — I25.83 CORONARY ARTERY DISEASE DUE TO LIPID RICH PLAQUE: ICD-10-CM

## 2020-12-17 DIAGNOSIS — F33.41 RECURRENT MAJOR DEPRESSIVE DISORDER, IN PARTIAL REMISSION (H): Chronic | ICD-10-CM

## 2020-12-17 DIAGNOSIS — I25.10 CORONARY ARTERY DISEASE DUE TO LIPID RICH PLAQUE: ICD-10-CM

## 2020-12-17 RX ORDER — BUPROPION HYDROCHLORIDE 300 MG/1
TABLET ORAL
Qty: 90 TABLET | Refills: 3 | OUTPATIENT
Start: 2020-12-17

## 2020-12-17 RX ORDER — BUPROPION HYDROCHLORIDE 150 MG/1
TABLET ORAL
Qty: 90 TABLET | Refills: 3 | OUTPATIENT
Start: 2020-12-17

## 2020-12-17 RX ORDER — ROSUVASTATIN CALCIUM 20 MG/1
TABLET, COATED ORAL
Qty: 90 TABLET | Refills: 3 | OUTPATIENT
Start: 2020-12-17

## 2020-12-22 RX ORDER — SODIUM PICOSULFATE, MAGNESIUM OXIDE, AND ANHYDROUS CITRIC ACID 10; 3.5; 12 MG/160ML; G/160ML; G/160ML
1 LIQUID ORAL SEE ADMIN INSTRUCTIONS
Qty: 2 BOTTLE | Refills: 0 | Status: SHIPPED | OUTPATIENT
Start: 2020-12-22 | End: 2021-04-06

## 2020-12-27 DIAGNOSIS — Z11.59 ENCOUNTER FOR SCREENING FOR OTHER VIRAL DISEASES: ICD-10-CM

## 2020-12-27 LAB
SARS-COV-2 RNA SPEC QL NAA+PROBE: NORMAL
SPECIMEN SOURCE: NORMAL

## 2020-12-27 PROCEDURE — U0003 INFECTIOUS AGENT DETECTION BY NUCLEIC ACID (DNA OR RNA); SEVERE ACUTE RESPIRATORY SYNDROME CORONAVIRUS 2 (SARS-COV-2) (CORONAVIRUS DISEASE [COVID-19]), AMPLIFIED PROBE TECHNIQUE, MAKING USE OF HIGH THROUGHPUT TECHNOLOGIES AS DESCRIBED BY CMS-2020-01-R: HCPCS | Performed by: SPECIALIST

## 2020-12-28 LAB
LABORATORY COMMENT REPORT: NORMAL
SARS-COV-2 RNA SPEC QL NAA+PROBE: NEGATIVE
SPECIMEN SOURCE: NORMAL

## 2020-12-29 RX ORDER — SODIUM PICOSULFATE, MAGNESIUM OXIDE, AND ANHYDROUS CITRIC ACID 10; 3.5; 12 MG/160ML; G/160ML; G/160ML
1 LIQUID ORAL SEE ADMIN INSTRUCTIONS
Qty: 2 BOTTLE | Refills: 0 | Status: SHIPPED | OUTPATIENT
Start: 2020-12-29 | End: 2021-04-06

## 2020-12-30 ENCOUNTER — HOSPITAL ENCOUNTER (OUTPATIENT)
Facility: AMBULATORY SURGERY CENTER | Age: 60
Discharge: HOME | End: 2020-12-30
Attending: SPECIALIST | Admitting: SPECIALIST
Payer: COMMERCIAL

## 2020-12-30 VITALS
HEART RATE: 77 BPM | DIASTOLIC BLOOD PRESSURE: 68 MMHG | OXYGEN SATURATION: 100 % | TEMPERATURE: 97.9 F | SYSTOLIC BLOOD PRESSURE: 108 MMHG | RESPIRATION RATE: 16 BRPM

## 2020-12-30 DIAGNOSIS — Z12.11 SPECIAL SCREENING FOR MALIGNANT NEOPLASMS, COLON: Primary | ICD-10-CM

## 2020-12-30 LAB — COLONOSCOPY: NORMAL

## 2020-12-30 PROCEDURE — 45378 DIAGNOSTIC COLONOSCOPY: CPT

## 2020-12-30 PROCEDURE — G8907 PT DOC NO EVENTS ON DISCHARG: HCPCS

## 2020-12-30 PROCEDURE — G8918 PT W/O PREOP ORDER IV AB PRO: HCPCS

## 2020-12-30 RX ORDER — ONDANSETRON 2 MG/ML
4 INJECTION INTRAMUSCULAR; INTRAVENOUS
Status: DISCONTINUED | OUTPATIENT
Start: 2020-12-30 | End: 2020-12-31 | Stop reason: HOSPADM

## 2020-12-30 RX ORDER — LIDOCAINE 40 MG/G
CREAM TOPICAL
Status: DISCONTINUED | OUTPATIENT
Start: 2020-12-30 | End: 2020-12-31 | Stop reason: HOSPADM

## 2020-12-30 RX ORDER — FENTANYL CITRATE 50 UG/ML
INJECTION, SOLUTION INTRAMUSCULAR; INTRAVENOUS PRN
Status: DISCONTINUED | OUTPATIENT
Start: 2020-12-30 | End: 2020-12-30 | Stop reason: HOSPADM

## 2020-12-30 NOTE — H&P
Pre-Endoscopy History and Physical     Madison Garcia MRN# [id number]   YOB: 1960 Age: 60 year old     Date of Procedure: 12/30/2020  Primary care provider: Tera Keith  Type of Endoscopy: Colonoscopy with possible biopsy, possible polypectomy  Reason for Procedure: history of polyps  Type of Anesthesia Anticipated: Conscious Sedation    HPI:    Madison is a 60 year old female who will be undergoing the above procedure.      A history and physical has been performed. The patient's medications and allergies have been reviewed. The risks and benefits of the procedure and the sedation options and risks were discussed with the patient.  All questions were answered and informed consent was obtained.      She denies a personal or family history of anesthesia complications or bleeding disorders.     Patient Active Problem List   Diagnosis     Colonic polyp     Lung nodules     ASCVD (arteriosclerotic cardiovascular disease)     Advanced directives, counseling/discussion     MVP (mitral valve prolapse)     Hyperlipidemia LDL goal <100     Chronic cystitis     Anxiety     [place] (dyspnea on exertion)     Recurrent major depressive disorder, in partial remission (H)     Insomnia, unspecified type     Elevated TSH     Ductal carcinoma in situ (DCIS) of right breast     Malignant neoplasm of upper-inner quadrant of right breast in female, estrogen receptor negative (H)        Past Medical History:   Diagnosis Date     Abdominal pain 2008 and 2010    ct neg 2008, ovar us 2010 with fallopian cyst and fibroids     Anxiety and depression 90's     ASCVD (arteriosclerotic cardiovascular disease) 12/2012    pos est, ct angio and cards fu, 25-49% lad     Chest tightness 12/2019    neg est echo     Chronic cystitis     Dr. Mcgrath     Chronic insomnia     for years, seen in sleep clinic 2016     Colonic polyp fu nl 2008    fu due 2013, fu done 2014 and nl, to fu 2019     [place] (dyspnea on exertion)     est echo nl 2015,  ct chest and pulm eval by Dr. Quiroga and no cause found     Ductal carcinoma in situ (DCIS) of right breast 2020    lumpectomy and xrt     Elevated TSH 11/2017     Hyperlipidaemia      Leg pain 2014    stopped lipitor     Lung nodules 01/2012    seen on coronary calcium ct, fu 1 year, fu done 12/12 and likely benign, fu done 2016 and no change, Dr. Quiroga     MVP (mitral valve prolapse) 2013    Had it surgically repaired, Robotic, done [place] 6/13 seen by cardiology 2011, echo 2007 with mild mr and nl lv fxn, fu echo 12/11 with lv size upper limits of nl, nl ef, no wma, mild lae, mvp and mild mr present     Palpitations      Screening 01/2012    cor ct score 0        Past Surgical History:   Procedure Laterality Date     HERNIA REPAIR       LUMPECTOMY BREAST WITH SEED LOCALIZATION Right 7/8/2020    Procedure: SEED LOCALIZED RIGHT BREAST LUMPECTOMY;  Surgeon: Elise Slater MD;  Location: [facility] OR     REPAIR VALVE MITRAL  6/13    done at [place], robotic       Social History     Tobacco Use     Smoking status: Never Smoker     Smokeless tobacco: Never Used   Substance Use Topics     Alcohol use: Yes     Alcohol/week: 0.0 standard drinks     Comment: 1-2/week        Family History   Problem Relation Age of Onset     Hypertension Father        Prior to Admission medications    Medication Sig Start Date End Date Taking? Authorizing Provider   ALPRAZolam (XANAX) 1 MG tablet TAKE 1 TABLET BY MOUTH EVERY DAY AT BEDTIME AS NEEDED FOR ANXIETY 12/10/20  Yes Tera Keith MD   aspirin 81 MG tablet Take 1 tablet by mouth daily. 2/11/13  Yes Tera Keith MD   buPROPion (WELLBUTRIN XL) 150 MG 24 hr tablet Take 1 of the 150mg along with one of the 300mg tablets for total daily dose of 450mg 12/10/20  Yes Tera Keith MD   buPROPion (WELLBUTRIN XL) 300 MG 24 hr tablet TAKE 1 TABLET BY MOUTH EVERY DAY ALONG WITH THE 150MG TABLET 12/10/20  Yes Tera Keith MD   diphenhydrAMINE-APAP, sleep, (TYLENOL PM  EXTRA STRENGTH PO) Take 2 tablets by mouth every evening    Yes Reported, Patient   ibuprofen (ADVIL) 200 MG tablet Take 400 mg by mouth every 4 hours as needed   Yes Reported, Patient   Ibuprofen-Diphenhydramine Cit (IBUPROFEN PM PO) Take 2 tablets by mouth every evening    Yes Reported, Patient   melatonin 5 MG CAPS Take 6-7.5 mg by mouth At Bedtime    Yes Tera Keith MD   Multiple Vitamin (DAILY MULTIVITAMIN PO) Take 1 tablet by mouth daily.   Yes Reported, Patient   polyethylene glycol (GOLYTELY) 236 g suspension Take 4,000 mLs by mouth See Admin Instructions The following are available over the counter:  1. One ten-ounce bottle of Citrate of Magnesium; Take at 7 pm, two days before you procedure.  2.  1 oz simethicone solution (40 mg/0.6 ml)  3.  Prescription 1 4-liter container.  In the evening before your procedure, fill the container with water to 4-liter fill line (if you purchased NuLytely, first add the contents of the flavor powder).  After capping the container, shake vigorously several times.  Do not refrigerate.  Sig: Follow split dose (2-Day) regimen:  At 6 pm before before your procedure:  Drink 8 oz. (240 ml) every 10 minutes, until 2 liters are consumed.  In the AM of you procedure: Add 1 teaspoon of simethicone (40 mg/0.6 ml) to the remaining 2 L prep.  Starting five (5) hours before your procedure, drink 8 oz. (240 ml) every 10 minutes until the remaining 2 liters are consumed.  Complete this step at least 2 hours before your arrival time. 12/22/20  Yes Tera Luu MD   rosuvastatin (CRESTOR) 20 MG tablet Take 1 tablet (20 mg) by mouth daily 12/10/20  Yes Tera Keith MD   Sod Picosulfate-Mag Ox-Cit Acd (CLENPIQ) 10-3.5-12 MG-GM -GM/160ML SOLN Take 1 Bottle by mouth See Admin Instructions Follow Spit Dose (2-Day) Regimen: Day1: The evening before your procedure: Drink on 5.4 oz bottle. Then drink at least 5- 8 oz cups of water within 5 hours.  Day 2: The morning of your  "procedure:  Starting 5 hours before your colonoscopy, drink the contents of the other 5.4 oz bottle.  Then drink at least 3 (three)- 8 oz cups of water, add 1 teaspoon of simethicone (40 mg/0.6 ml) to the second glass of water.  Complete this at least 2 hours before you arrival time. 12/29/20  Yes Tera Luu MD   Sod Picosulfate-Mag Ox-Cit Acd (CLENPIQ) 10-3.5-12 MG-GM -GM/160ML SOLN Take 1 Bottle by mouth See Admin Instructions Follow Spit Dose (2-Day) Regimen: Day1: The evening before your procedure: Drink on 5.4 oz bottle. Then drink at least 5- 8 oz cups of water within 5 hours.  Day 2: The morning of your procedure:  Starting 5 hours before your colonoscopy, drink the contents of the other 5.4 oz bottle.  Then drink at least 3 (three)- 8 oz cups of water, add 1 teaspoon of simethicone (40 mg/0.6 ml) to the second glass of water.  Complete this at least 2 hours before you arrival time. 12/22/20  Yes Tera Luu MD   amoxicillin (AMOXIL) 500 MG tablet Take 2,000 mg by mouth daily as needed (prior to dental appointments)    Reported, Patient       Allergies   Allergen Reactions     Albumin (Human) Difficulty breathing     Atorvastatin Muscle Pain (Myalgia)     Erythromycin      GI upset, intolerance        REVIEW OF SYSTEMS:   5 point ROS negative except as noted above in HPI, including Gen., Resp., CV, GI &  system review.    PHYSICAL EXAM:   /70   Pulse 90   Temp 97.2  F (36.2  C) (Temporal)   Resp 16   SpO2 98%  Estimated body mass index is 25.55 kg/m  as calculated from the following:    Height as of 12/10/20: 1.753 m (5' 9\").    Weight as of 12/10/20: 78.5 kg (173 lb).   GENERAL APPEARANCE: alert, and oriented  MENTAL STATUS: alert  AIRWAY EXAM: Mallampatti Class II (visualization of the soft palate, fauces, and uvula)  RESP: lungs clear to auscultation - no rales, rhonchi or wheezes  CV: regular rates and rhythm  DIAGNOSTICS:    Not indicated    IMPRESSION   ASA Class 2 - Mild systemic " disease    PLAN:   Plan for Colonoscopy with possible biopsy, possible polypectomy. We discussed the risks, benefits and alternatives and the patient wished to proceed.    The above has been forwarded to the consulting provider.      Signed Electronically by: Tera Luu MD  December 30, 2020

## 2021-01-06 PROBLEM — D05.11 DUCTAL CARCINOMA IN SITU (DCIS) OF RIGHT BREAST: Status: ACTIVE | Noted: 2020-06-17

## 2021-01-18 ENCOUNTER — ANCILLARY PROCEDURE (OUTPATIENT)
Dept: MAMMOGRAPHY | Facility: CLINIC | Age: 61
End: 2021-01-18
Attending: SURGERY
Payer: COMMERCIAL

## 2021-01-18 DIAGNOSIS — D05.11 DUCTAL CARCINOMA IN SITU (DCIS) OF RIGHT BREAST: ICD-10-CM

## 2021-01-18 PROCEDURE — G0279 TOMOSYNTHESIS, MAMMO: HCPCS | Performed by: RADIOLOGY

## 2021-01-18 PROCEDURE — 77065 DX MAMMO INCL CAD UNI: CPT | Performed by: RADIOLOGY

## 2021-01-21 ENCOUNTER — OFFICE VISIT (OUTPATIENT)
Dept: SURGERY | Facility: CLINIC | Age: 61
End: 2021-01-21
Payer: COMMERCIAL

## 2021-01-21 DIAGNOSIS — D05.11 DUCTAL CARCINOMA IN SITU (DCIS) OF RIGHT BREAST: Primary | ICD-10-CM

## 2021-01-21 DIAGNOSIS — R59.9 ADENOPATHY: ICD-10-CM

## 2021-01-21 PROCEDURE — 99214 OFFICE O/P EST MOD 30 MIN: CPT | Performed by: SURGERY

## 2021-01-21 NOTE — LETTER
1/21/2021         RE: Madison Garcia  [address] [address] [place] [id number]        Dear Colleague,    Thank you for referring your patient, Madison Garcia, to the [facility]. Please see a copy of my visit note below.    [facility] Follow Up Note    CHIEF COMPLAINT:  H/o right breast DCIS    HISTORY OF PRESENT ILLNESS:  Madison Garcia is a 60 year old female who is seen in follow up for right breast DCIS s/p lumpectomy and radiation.  She underwent a seed localized right breast lumpectomy on 7/8/2020 for ER/AL negative grade 3 DCIS. She then completed radiation under the care of Dr. Santillan. She had a right diagnostic mammogram with tomosynthesis on 1/18/2021 to reestablish a new baseline following surgery and radiation. It revealed post surgical changes in the right breast and loosely grouped coarse calcifications in the right upper inner breast for which 6 month follow up was recommended.     She had seen Dr. Huerta after surgery and discussed a follow up breast MRI due to right diaphragmatic and intramammary lymph nodes in 6 months.     She reports no new concerns. She has had some tenderness at the lumpectomy site since surgery. She does have bilateral breast tenderness with pressure. No other concerns.     REVIEW OF SYSTEMS:  Constitutional:  Negative for chills, fatigue, fever and weight change.  Eyes:  Negative for blurred vision, eye pain and photophobia.  ENT:  Negative for hearing problems, ENT pain, congestion, rhinorrhea, epistaxis, hoarseness and dental problems.  Cardiovascular:  Negative for chest pain, palpitations, tachycardia, orthopnea and edema.  Respiratory:  Negative for cough, dyspnea and hemoptysis.  Gastrointestinal:  Negative for abdominal pain, heartburn, constipation, diarrhea and stool changes.  Musculoskeletal:  Negative for arthralgias, back pain and myalgias.  Integumentary/Breast:  See HPI.    Past Medical History:   Diagnosis  Date     Abdominal pain 2008 and 2010    ct neg 2008, ovar us 2010 with fallopian cyst and fibroids     Anxiety and depression 90's     ASCVD (arteriosclerotic cardiovascular disease) 12/2012    pos est, ct angio and cards fu, 25-49% lad     Chest tightness 12/2019    neg est echo     Chronic cystitis     Dr. Mcgrath     Chronic insomnia     for years, seen in sleep clinic 2016     Colonic polyp fu nl 2008    fu due 2013, fu done 2014 and nl, to fu 2019, fu nl 12/20     [place] (dyspnea on exertion)     est echo nl 2015, ct chest and pulm eval by Dr. Quiroga and no cause found     Ductal carcinoma in situ (DCIS) of right breast 2020    lumpectomy and xrt     Elevated TSH 11/2017     Hyperlipidaemia      Leg pain 2014    stopped lipitor     Lung nodules 01/2012    seen on coronary calcium ct, fu 1 year, fu done 12/12 and likely benign, fu done 2016 and no change, Dr. Quiroga     MVP (mitral valve prolapse) 2013    Had it surgically repaired, Robotic, done [place] 6/13 seen by cardiology 2011, echo 2007 with mild mr and nl lv fxn, fu echo 12/11 with lv size upper limits of nl, nl ef, no wma, mild lae, mvp and mild mr present     Palpitations      Screening 01/2012    cor ct score 0       Past Surgical History:   Procedure Laterality Date     COLONOSCOPY WITH CO2 INSUFFLATION N/A 12/30/2020    Procedure: COLONOSCOPY, WITH CO2 INSUFFLATION;  Surgeon: Tera Luu MD;  Location: [facility] OR     HERNIA REPAIR       LUMPECTOMY BREAST WITH SEED LOCALIZATION Right 7/8/2020    Procedure: SEED LOCALIZED RIGHT BREAST LUMPECTOMY;  Surgeon: Elise Slater MD;  Location: [facility] OR     REPAIR VALVE MITRAL  6/13    done at [place], robotic       Family History   Problem Relation Age of Onset     Hypertension Father        Social History     Tobacco Use     Smoking status: Never Smoker     Smokeless tobacco: Never Used   Substance Use Topics     Alcohol use: Yes     Alcohol/week: 0.0 standard drinks     Comment: 1-2/week        Patient Active Problem  List   Diagnosis     Colonic polyp     Lung nodules     ASCVD (arteriosclerotic cardiovascular disease)     Advanced directives, counseling/discussion     MVP (mitral valve prolapse)     Hyperlipidemia LDL goal <100     Chronic cystitis     Anxiety     [place] (dyspnea on exertion)     Recurrent major depressive disorder, in partial remission (H)     Insomnia, unspecified type     Elevated TSH     Ductal carcinoma in situ (DCIS) of right breast     Malignant neoplasm of upper-inner quadrant of right breast in female, estrogen receptor negative (H)     Allergies   Allergen Reactions     Albumin (Human) Difficulty breathing     Atorvastatin Muscle Pain (Myalgia)     Erythromycin      GI upset, intolerance     Current Outpatient Medications   Medication Sig Dispense Refill     ALPRAZolam (XANAX) 1 MG tablet TAKE 1 TABLET BY MOUTH EVERY DAY AT BEDTIME AS NEEDED FOR ANXIETY 60 tablet 0     amoxicillin (AMOXIL) 500 MG tablet Take 2,000 mg by mouth daily as needed (prior to dental appointments)       aspirin 81 MG tablet Take 1 tablet by mouth daily. 1 tablet 3     buPROPion (WELLBUTRIN XL) 150 MG 24 hr tablet Take 1 of the 150mg along with one of the 300mg tablets for total daily dose of 450mg 90 tablet 3     buPROPion (WELLBUTRIN XL) 300 MG 24 hr tablet TAKE 1 TABLET BY MOUTH EVERY DAY ALONG WITH THE 150MG TABLET 90 tablet 3     diphenhydrAMINE-APAP, sleep, (TYLENOL PM EXTRA STRENGTH PO) Take 2 tablets by mouth every evening        ibuprofen (ADVIL) 200 MG tablet Take 400 mg by mouth every 4 hours as needed       Ibuprofen-Diphenhydramine Cit (IBUPROFEN PM PO) Take 2 tablets by mouth every evening        melatonin 5 MG CAPS Take 6-7.5 mg by mouth At Bedtime        Multiple Vitamin (DAILY MULTIVITAMIN PO) Take 1 tablet by mouth daily.       polyethylene glycol (GOLYTELY) 236 g suspension Take 4,000 mLs by mouth See Admin Instructions The following are available over the counter:  1. One ten-ounce bottle of Citrate of  Magnesium; Take at 7 pm, two days before you procedure.  2.  1 oz simethicone solution (40 mg/0.6 ml)  3.  Prescription 1 4-liter container.  In the evening before your procedure, fill the container with water to 4-liter fill line (if you purchased NuLytely, first add the contents of the flavor powder).  After capping the container, shake vigorously several times.  Do not refrigerate.  Sig: Follow split dose (2-Day) regimen:  At 6 pm before before your procedure:  Drink 8 oz. (240 ml) every 10 minutes, until 2 liters are consumed.  In the AM of you procedure: Add 1 teaspoon of simethicone (40 mg/0.6 ml) to the remaining 2 L prep.  Starting five (5) hours before your procedure, drink 8 oz. (240 ml) every 10 minutes until the remaining 2 liters are consumed.  Complete this step at least 2 hours before your arrival time. 1 each 0     rosuvastatin (CRESTOR) 20 MG tablet Take 1 tablet (20 mg) by mouth daily 90 tablet 3     Sod Picosulfate-Mag Ox-Cit Acd (CLENPIQ) 10-3.5-12 MG-GM -GM/160ML SOLN Take 1 Bottle by mouth See Admin Instructions Follow Spit Dose (2-Day) Regimen: Day1: The evening before your procedure: Drink on 5.4 oz bottle. Then drink at least 5- 8 oz cups of water within 5 hours.  Day 2: The morning of your procedure:  Starting 5 hours before your colonoscopy, drink the contents of the other 5.4 oz bottle.  Then drink at least 3 (three)- 8 oz cups of water, add 1 teaspoon of simethicone (40 mg/0.6 ml) to the second glass of water.  Complete this at least 2 hours before you arrival time. 2 Bottle 0     Sod Picosulfate-Mag Ox-Cit Acd (CLENPIQ) 10-3.5-12 MG-GM -GM/160ML SOLN Take 1 Bottle by mouth See Admin Instructions Follow Spit Dose (2-Day) Regimen: Day1: The evening before your procedure: Drink on 5.4 oz bottle. Then drink at least 5- 8 oz cups of water within 5 hours.  Day 2: The morning of your procedure:  Starting 5 hours before your colonoscopy, drink the contents of the other 5.4 oz bottle.  Then  drink at least 3 (three)- 8 oz cups of water, add 1 teaspoon of simethicone (40 mg/0.6 ml) to the second glass of water.  Complete this at least 2 hours before you arrival time. 2 Bottle 0     EXAM:  GENERAL: healthy, alert and no distress   BREAST:  Well healed lumpectomy scar on the right upper breast. Tender to palpation across breast but more so at the lumpectomy site. Scar on the right lateral breast. Minimal skin changes related to radiation. The left breast is larger than the right. Nipples are everted bilaterally. No masses in either breast. There is no axillary or supraclavicular lymphadenopathy.  CARDIOVASCULAR:  RRR  RESPIRATORY: nonlabored breathing  NECK: Neck supple. No adenopathy. Thyroid symmetric, normal size,, Carotids without bruits.  SKIN: No suspicious lesions or rashes  LYMPH: Normal cervical lymph nodes      ASSESSMENT/PLAN:  Madison Garcia is a 60 year old female who is seen in follow up for right breast DCIS s/p lumpectomy and radiation.  She underwent a seed localized right breast lumpectomy on 7/8/2020 for ER/VA negative grade 3 DCIS. Her breast imaging reveals loosely grouped upper inner calcifications on the right breast. She is due for a 6 month follow up MRI now as well given findings of diaphragmatic and  intramammary lymph nodes and we will order and schedule this for her. I will also order a follow up bilateral mammogram for 6 months from now. I will see her for follow up again after her mammogram in 6 months and will call her with her MRI results when available. She is comfortable with our plan.       Elise Slater MD  [facility]  [phone]        Please route or send letter to:  Primary Care Provider (PCP) and Referring Provider          Again, thank you for allowing me to participate in the care of your patient.        Sincerely,        Elise Slater MD

## 2021-01-21 NOTE — NURSING NOTE
Madison Garcia's goals for this visit include:   Chief Complaint   Patient presents with     RECHECK     6 month exam, hx of DCIS       She requests these members of her care team be copied on today's visit information: no    PCP: Tera Keith    Referring Provider:  Elise Slater MD  [address]  [place] [id number]    There were no vitals taken for this visit.    Do you need any medication refills at today's visit? No    Radha Phillips LPN

## 2021-01-21 NOTE — PROGRESS NOTES
[facility] Follow Up Note    CHIEF COMPLAINT:  H/o right breast DCIS    HISTORY OF PRESENT ILLNESS:  Madison Garcia is a 60 year old female who is seen in follow up for right breast DCIS s/p lumpectomy and radiation.  She underwent a seed localized right breast lumpectomy on 7/8/2020 for ER/AL negative grade 3 DCIS. She then completed radiation under the care of Dr. Santillan. She had a right diagnostic mammogram with tomosynthesis on 1/18/2021 to reestablish a new baseline following surgery and radiation. It revealed post surgical changes in the right breast and loosely grouped coarse calcifications in the right upper inner breast for which 6 month follow up was recommended.     She had seen Dr. Huerta after surgery and discussed a follow up breast MRI due to right diaphragmatic and intramammary lymph nodes in 6 months.     She reports no new concerns. She has had some tenderness at the lumpectomy site since surgery. She does have bilateral breast tenderness with pressure. No other concerns.     REVIEW OF SYSTEMS:  Constitutional:  Negative for chills, fatigue, fever and weight change.  Eyes:  Negative for blurred vision, eye pain and photophobia.  ENT:  Negative for hearing problems, ENT pain, congestion, rhinorrhea, epistaxis, hoarseness and dental problems.  Cardiovascular:  Negative for chest pain, palpitations, tachycardia, orthopnea and edema.  Respiratory:  Negative for cough, dyspnea and hemoptysis.  Gastrointestinal:  Negative for abdominal pain, heartburn, constipation, diarrhea and stool changes.  Musculoskeletal:  Negative for arthralgias, back pain and myalgias.  Integumentary/Breast:  See HPI.    Past Medical History:   Diagnosis Date     Abdominal pain 2008 and 2010    ct neg 2008, ovar us 2010 with fallopian cyst and fibroids     Anxiety and depression 90's     ASCVD (arteriosclerotic cardiovascular disease) 12/2012    pos est, ct angio and cards fu, 25-49% lad     Chest tightness  12/2019    neg est echo     Chronic cystitis     Dr. Mcgrath     Chronic insomnia     for years, seen in sleep clinic 2016     Colonic polyp fu nl 2008    fu due 2013, fu done 2014 and nl, to fu 2019, fu nl 12/20     [place] (dyspnea on exertion)     est echo nl 2015, ct chest and pulm eval by Dr. Quiroga and no cause found     Ductal carcinoma in situ (DCIS) of right breast 2020    lumpectomy and xrt     Elevated TSH 11/2017     Hyperlipidaemia      Leg pain 2014    stopped lipitor     Lung nodules 01/2012    seen on coronary calcium ct, fu 1 year, fu done 12/12 and likely benign, fu done 2016 and no change, Dr. Quiroga     MVP (mitral valve prolapse) 2013    Had it surgically repaired, Robotic, done [place] 6/13 seen by cardiology 2011, echo 2007 with mild mr and nl lv fxn, fu echo 12/11 with lv size upper limits of nl, nl ef, no wma, mild lae, mvp and mild mr present     Palpitations      Screening 01/2012    cor ct score 0       Past Surgical History:   Procedure Laterality Date     COLONOSCOPY WITH CO2 INSUFFLATION N/A 12/30/2020    Procedure: COLONOSCOPY, WITH CO2 INSUFFLATION;  Surgeon: Tera Luu MD;  Location: [facility] OR     HERNIA REPAIR       LUMPECTOMY BREAST WITH SEED LOCALIZATION Right 7/8/2020    Procedure: SEED LOCALIZED RIGHT BREAST LUMPECTOMY;  Surgeon: Elise Slater MD;  Location: [facility] OR     REPAIR VALVE MITRAL  6/13    done at [place], robotic       Family History   Problem Relation Age of Onset     Hypertension Father        Social History     Tobacco Use     Smoking status: Never Smoker     Smokeless tobacco: Never Used   Substance Use Topics     Alcohol use: Yes     Alcohol/week: 0.0 standard drinks     Comment: 1-2/week        Patient Active Problem List   Diagnosis     Colonic polyp     Lung nodules     ASCVD (arteriosclerotic cardiovascular disease)     Advanced directives, counseling/discussion     MVP (mitral valve prolapse)     Hyperlipidemia LDL goal <100     Chronic cystitis     Anxiety     [place]  (dyspnea on exertion)     Recurrent major depressive disorder, in partial remission (H)     Insomnia, unspecified type     Elevated TSH     Ductal carcinoma in situ (DCIS) of right breast     Malignant neoplasm of upper-inner quadrant of right breast in female, estrogen receptor negative (H)     Allergies   Allergen Reactions     Albumin (Human) Difficulty breathing     Atorvastatin Muscle Pain (Myalgia)     Erythromycin      GI upset, intolerance     Current Outpatient Medications   Medication Sig Dispense Refill     ALPRAZolam (XANAX) 1 MG tablet TAKE 1 TABLET BY MOUTH EVERY DAY AT BEDTIME AS NEEDED FOR ANXIETY 60 tablet 0     amoxicillin (AMOXIL) 500 MG tablet Take 2,000 mg by mouth daily as needed (prior to dental appointments)       aspirin 81 MG tablet Take 1 tablet by mouth daily. 1 tablet 3     buPROPion (WELLBUTRIN XL) 150 MG 24 hr tablet Take 1 of the 150mg along with one of the 300mg tablets for total daily dose of 450mg 90 tablet 3     buPROPion (WELLBUTRIN XL) 300 MG 24 hr tablet TAKE 1 TABLET BY MOUTH EVERY DAY ALONG WITH THE 150MG TABLET 90 tablet 3     diphenhydrAMINE-APAP, sleep, (TYLENOL PM EXTRA STRENGTH PO) Take 2 tablets by mouth every evening        ibuprofen (ADVIL) 200 MG tablet Take 400 mg by mouth every 4 hours as needed       Ibuprofen-Diphenhydramine Cit (IBUPROFEN PM PO) Take 2 tablets by mouth every evening        melatonin 5 MG CAPS Take 6-7.5 mg by mouth At Bedtime        Multiple Vitamin (DAILY MULTIVITAMIN PO) Take 1 tablet by mouth daily.       polyethylene glycol (GOLYTELY) 236 g suspension Take 4,000 mLs by mouth See Admin Instructions The following are available over the counter:  1. One ten-ounce bottle of Citrate of Magnesium; Take at 7 pm, two days before you procedure.  2.  1 oz simethicone solution (40 mg/0.6 ml)  3.  Prescription 1 4-liter container.  In the evening before your procedure, fill the container with water to 4-liter fill line (if you purchased NuLytely, first  add the contents of the flavor powder).  After capping the container, shake vigorously several times.  Do not refrigerate.  Sig: Follow split dose (2-Day) regimen:  At 6 pm before before your procedure:  Drink 8 oz. (240 ml) every 10 minutes, until 2 liters are consumed.  In the AM of you procedure: Add 1 teaspoon of simethicone (40 mg/0.6 ml) to the remaining 2 L prep.  Starting five (5) hours before your procedure, drink 8 oz. (240 ml) every 10 minutes until the remaining 2 liters are consumed.  Complete this step at least 2 hours before your arrival time. 1 each 0     rosuvastatin (CRESTOR) 20 MG tablet Take 1 tablet (20 mg) by mouth daily 90 tablet 3     Sod Picosulfate-Mag Ox-Cit Acd (CLENPIQ) 10-3.5-12 MG-GM -GM/160ML SOLN Take 1 Bottle by mouth See Admin Instructions Follow Spit Dose (2-Day) Regimen: Day1: The evening before your procedure: Drink on 5.4 oz bottle. Then drink at least 5- 8 oz cups of water within 5 hours.  Day 2: The morning of your procedure:  Starting 5 hours before your colonoscopy, drink the contents of the other 5.4 oz bottle.  Then drink at least 3 (three)- 8 oz cups of water, add 1 teaspoon of simethicone (40 mg/0.6 ml) to the second glass of water.  Complete this at least 2 hours before you arrival time. 2 Bottle 0     Sod Picosulfate-Mag Ox-Cit Acd (CLENPIQ) 10-3.5-12 MG-GM -GM/160ML SOLN Take 1 Bottle by mouth See Admin Instructions Follow Spit Dose (2-Day) Regimen: Day1: The evening before your procedure: Drink on 5.4 oz bottle. Then drink at least 5- 8 oz cups of water within 5 hours.  Day 2: The morning of your procedure:  Starting 5 hours before your colonoscopy, drink the contents of the other 5.4 oz bottle.  Then drink at least 3 (three)- 8 oz cups of water, add 1 teaspoon of simethicone (40 mg/0.6 ml) to the second glass of water.  Complete this at least 2 hours before you arrival time. 2 Bottle 0     EXAM:  GENERAL: healthy, alert and no distress   BREAST:  Well healed  lumpectomy scar on the right upper breast. Tender to palpation across breast but more so at the lumpectomy site. Scar on the right lateral breast. Minimal skin changes related to radiation. The left breast is larger than the right. Nipples are everted bilaterally. No masses in either breast. There is no axillary or supraclavicular lymphadenopathy.  CARDIOVASCULAR:  RRR  RESPIRATORY: nonlabored breathing  NECK: Neck supple. No adenopathy. Thyroid symmetric, normal size,, Carotids without bruits.  SKIN: No suspicious lesions or rashes  LYMPH: Normal cervical lymph nodes      ASSESSMENT/PLAN:  Madison Garcia is a 60 year old female who is seen in follow up for right breast DCIS s/p lumpectomy and radiation.  She underwent a seed localized right breast lumpectomy on 7/8/2020 for ER/IL negative grade 3 DCIS. Her breast imaging reveals loosely grouped upper inner calcifications on the right breast. She is due for a 6 month follow up MRI now as well given findings of diaphragmatic and  intramammary lymph nodes and we will order and schedule this for her. I will also order a follow up bilateral mammogram for 6 months from now. I will see her for follow up again after her mammogram in 6 months and will call her with her MRI results when available. She is comfortable with our plan.       Elise Slater MD  [facility]  [phone]        Please route or send letter to:  Primary Care Provider (PCP) and Referring Provider

## 2021-01-29 ENCOUNTER — TRANSFERRED RECORDS (OUTPATIENT)
Dept: HEALTH INFORMATION MANAGEMENT | Facility: CLINIC | Age: 61
End: 2021-01-29

## 2021-02-03 DIAGNOSIS — F41.9 ANXIETY: ICD-10-CM

## 2021-02-04 NOTE — TELEPHONE ENCOUNTER
Alprazolam 1 mg tablets    Summary: TAKE 1 TABLET BY MOUTH EVERY DAY AT BEDTIME AS NEEDED FOR ANXIETY, Disp-60 tablet, R-0, E-Prescribe   Start: 12/10/2020  Ord/Sold: 12/10/2020     PATIENT IS REQUESTING 90 DAYS SUPPLY

## 2021-02-05 ENCOUNTER — ANCILLARY PROCEDURE (OUTPATIENT)
Dept: MRI IMAGING | Facility: CLINIC | Age: 61
End: 2021-02-05
Attending: SURGERY
Payer: COMMERCIAL

## 2021-02-05 DIAGNOSIS — R59.9 ADENOPATHY: ICD-10-CM

## 2021-02-05 PROCEDURE — 77049 MRI BREAST C-+ W/CAD BI: CPT | Performed by: RADIOLOGY

## 2021-02-05 PROCEDURE — A9585 GADOBUTROL INJECTION: HCPCS | Performed by: RADIOLOGY

## 2021-02-05 RX ORDER — GADOBUTROL 604.72 MG/ML
10 INJECTION INTRAVENOUS ONCE
Status: COMPLETED | OUTPATIENT
Start: 2021-02-05 | End: 2021-02-05

## 2021-02-05 RX ADMIN — GADOBUTROL 8 ML: 604.72 INJECTION INTRAVENOUS at 14:56

## 2021-02-07 RX ORDER — ALPRAZOLAM 1 MG
TABLET ORAL
Qty: 60 TABLET | Refills: 0 | Status: SHIPPED | OUTPATIENT
Start: 2021-02-07 | End: 2021-04-06

## 2021-02-09 ENCOUNTER — TELEPHONE (OUTPATIENT)
Dept: SURGERY | Facility: PHYSICIAN GROUP | Age: 61
End: 2021-02-09

## 2021-02-09 NOTE — TELEPHONE ENCOUNTER
I called Madison and let her know her MRI results were good. There are expected post surgical changes related to her lumpectomy. I did discuss her MRI with Dr. Rivera (radiology) to evaluate the right diaphragmatic and internal mammary nodes which were more prominent on the MRI from last summer. These nodes on MR on 2/5/21 are unchanged which is great. Plan for mammogram this summer and clinic visit to follow as planned. Continue with high risk screening alternating mammogram and MRI.     Elise Slater MD  [facility]  [phone]

## 2021-02-10 ENCOUNTER — TRANSFERRED RECORDS (OUTPATIENT)
Dept: HEALTH INFORMATION MANAGEMENT | Facility: CLINIC | Age: 61
End: 2021-02-10

## 2021-03-17 ENCOUNTER — TRANSFERRED RECORDS (OUTPATIENT)
Dept: HEALTH INFORMATION MANAGEMENT | Facility: CLINIC | Age: 61
End: 2021-03-17

## 2021-03-19 ENCOUNTER — IMMUNIZATION (OUTPATIENT)
Dept: PEDIATRICS | Facility: CLINIC | Age: 61
End: 2021-03-19
Payer: COMMERCIAL

## 2021-03-19 PROCEDURE — 91300 PR COVID VAC PFIZER DIL RECON 30 MCG/0.3 ML IM: CPT

## 2021-03-19 PROCEDURE — 0001A PR COVID VAC PFIZER DIL RECON 30 MCG/0.3 ML IM: CPT

## 2021-04-05 DIAGNOSIS — F41.9 ANXIETY: ICD-10-CM

## 2021-04-06 RX ORDER — ALPRAZOLAM 1 MG
TABLET ORAL
Qty: 60 TABLET | Refills: 0 | Status: SHIPPED | OUTPATIENT
Start: 2021-04-06 | End: 2021-06-04

## 2021-04-06 NOTE — TELEPHONE ENCOUNTER
Routing refill request to provider for review/approval because:  Drug not on the [facility] refill protocol     Also routing to TC to schedule   Rosalinda Mary RN

## 2021-04-06 NOTE — TELEPHONE ENCOUNTER
alprazolam      Last Written Prescription Date:  02/07/2021  Last Fill Quantity: 60,   # refills: 0  Last Office Visit: 02/10/2020  Future Office visit:       Routing refill request to provider for review/approval because:  Drug not on the [facility], [facility] or [facility] refill protocol or controlled substance    Zulma Wilson MA

## 2021-04-06 NOTE — TELEPHONE ENCOUNTER
Alprazolam 1 mg tablets    Summary: TAKE 1 TABLET BY MOUTH EVERY DAY AT BEDTIME AS NEEDED FOR ANXIETY, Disp-60 tablet, R-0, E-Prescribe   Start: 2/7/2021  Ord/Sold: 2/7/2021

## 2021-04-09 ENCOUNTER — IMMUNIZATION (OUTPATIENT)
Dept: PEDIATRICS | Facility: CLINIC | Age: 61
End: 2021-04-09
Attending: INTERNAL MEDICINE
Payer: COMMERCIAL

## 2021-04-09 PROCEDURE — 91300 PR COVID VAC PFIZER DIL RECON 30 MCG/0.3 ML IM: CPT

## 2021-04-09 PROCEDURE — 0002A PR COVID VAC PFIZER DIL RECON 30 MCG/0.3 ML IM: CPT

## 2021-05-02 NOTE — TELEPHONE ENCOUNTER
Pharmacy requested Bupropion 300mg and 150mg - pt is now taking 3 x 150mg instead    Changed the 150mg to 3tabs, please deny the 300mg    Pending Prescriptions:                       Disp   Refills    buPROPion (WELLBUTRIN XL) 300 MG 24 hr ta*90 tab*0            Sig: TAKE 1 TABLET BY MOUTH EVERY DAY ALONG WITH THE           150MG TABLET    ALPRAZolam (XANAX) 1 MG tablet [Pharmacy *90 tab*0            Sig: TAKE 1 TAB AT NIGHT AS NEEDED FOR ANXIETY    buPROPion (WELLBUTRIN XL) 150 MG 24 hr ta*270 ta*0            Sig: Take 3 tablets (450 mg) by mouth daily      Xanax      Last Written Prescription Date:  5/1//17  Last Fill Quantity: 90,   # refills: 0  Last Office Visit with [facility], [facility] or  [facility] prescribing provider: 11/4/16 [facility]  Future Office visit:    Next 5 appointments (look out 90 days)     Oct 24, 2017  8:45 AM CDT   Return Visit with Casey Roberts MD   [facility] ([facility])    [address] [place] [id number]   [phone]                   Routing refill request to provider for review/approval because:  Drug not on the [facility], [facility] or  [facility] refill protocol or controlled substance    Bupropion 150mg       Last Written Prescription Date: 12/28/16  Last Fill Quantity: 270; # refills: 1  Last Office Visit with [facility], [facility] or  [facility] prescribing provider:  11/4/16 [facility]   Next 5 appointments (look out 90 days)     Oct 24, 2017  8:45 AM CDT   Return Visit with Casey Roberts MD   [facility] ([facility])    [address] [place], [place] [id number]  [facility] [id number]   [phone]                   Last PHQ-9 score on record=   PHQ-9 SCORE 11/4/2016   Total Score -   Total Score 5       Lab Results   Component Value Date    AST 21 11/04/2016     Lab Results   Component Value Date    ALT 27 11/04/2016     Melly Lechuga RT(R)    
Xanax faxed to [facility] [place].   Zulma Wilson MA    
no

## 2021-06-02 DIAGNOSIS — F41.9 ANXIETY: ICD-10-CM

## 2021-06-02 NOTE — TELEPHONE ENCOUNTER
Alprazolam 1 mg tablets    Summary: TAKE 1 TABLET BY MOUTH EVERY DAY AT BEDTIME AS NEEDED FOR ANXIETY, Disp-60 tablet, R-0, E-Prescribe   Start: 4/6/2021  Ord/Sold: 4/6/2021

## 2021-06-04 RX ORDER — ALPRAZOLAM 1 MG
TABLET ORAL
Qty: 60 TABLET | Refills: 0 | Status: SHIPPED | OUTPATIENT
Start: 2021-06-04 | End: 2021-08-03

## 2021-07-14 NOTE — PATIENT INSTRUCTIONS
I would recommend getting the new shingles shot called shingrix, but I would do it at your pharmacy as they can check with the insurance company to see if it is paid for.    Someone should call you in the next few days to schedule the stress test, if not let me know    Tera Keith M.D.     Referral faxed.

## 2021-07-21 ENCOUNTER — ANCILLARY PROCEDURE (OUTPATIENT)
Dept: MAMMOGRAPHY | Facility: CLINIC | Age: 61
End: 2021-07-21
Attending: SURGERY
Payer: COMMERCIAL

## 2021-07-21 DIAGNOSIS — R92.8 BI-RADS CATEGORY 3 MAMMOGRAM RESULT: ICD-10-CM

## 2021-07-21 PROCEDURE — 77066 DX MAMMO INCL CAD BI: CPT | Performed by: RADIOLOGY

## 2021-07-21 PROCEDURE — 77062 BREAST TOMOSYNTHESIS BI: CPT | Performed by: RADIOLOGY

## 2021-07-22 ENCOUNTER — OFFICE VISIT (OUTPATIENT)
Dept: SURGERY | Facility: CLINIC | Age: 61
End: 2021-07-22
Payer: COMMERCIAL

## 2021-07-22 DIAGNOSIS — D05.11 DUCTAL CARCINOMA IN SITU (DCIS) OF RIGHT BREAST: Primary | ICD-10-CM

## 2021-07-22 PROCEDURE — 99213 OFFICE O/P EST LOW 20 MIN: CPT | Performed by: SURGERY

## 2021-07-22 NOTE — NURSING NOTE
Madison Garcia's goals for this visit include:   Chief Complaint   Patient presents with     RECHECK     6 month exam, hx of DCIS       She requests these members of her care team be copied on today's visit information: no    PCP: Tera Keith    Referring Provider:  Elise Slater MD  [address]  [place] [id number]    There were no vitals taken for this visit.    Do you need any medication refills at today's visit? No    Radha Phillips LPN

## 2021-07-22 NOTE — LETTER
7/22/2021         RE: Madison Garcia  [address] [address] [place] [id number]        Dear Colleague,    Thank you for referring your patient, Madison Garcia, to the [facility]. Please see a copy of my visit note below.    [facility] Follow Up Note    CHIEF COMPLAINT:  H/o DCIS    HISTORY OF PRESENT ILLNESS:   Madison Garcia is a 60 year old female who is seen in follow up for right breast DCIS s/p lumpectomy and radiation.  She underwent a seed localized right breast lumpectomy on 7/8/2020 for ER/LA negative grade 3 DCIS. She then completed radiation under the care of Dr. Santillan. She had a right diagnostic mammogram with tomosynthesis on 1/18/2021 to reestablish a new baseline following surgery and radiation. It revealed post surgical changes in the right breast and loosely grouped coarse calcifications in the right upper inner breast for which 6 month follow up was recommended.     She had her 6 month mammogram on 7/21/2021 which unfortunately revealed new concerning calcifications on the left upper outer breast near a prior biopsy marker (2018 - fibroadenoma). She is scheduled for stereotactic biopsy on 8/2/2021.     REVIEW OF SYSTEMS:  Constitutional:  Negative for chills, fatigue, fever and weight change.  Eyes:  Negative for blurred vision, eye pain and photophobia.  ENT:  Negative for hearing problems, ENT pain, congestion, rhinorrhea, epistaxis, hoarseness and dental problems.  Cardiovascular:  Negative for chest pain, palpitations, tachycardia, orthopnea and edema.  Respiratory:  Negative for cough, dyspnea and hemoptysis.  Gastrointestinal:  Negative for abdominal pain, heartburn, constipation, diarrhea and stool changes.  Musculoskeletal:  Negative for arthralgias, back pain and myalgias.  Integumentary/Breast:  See HPI.    Past Medical History:   Diagnosis Date     Abdominal pain 2008 and 2010    ct neg 2008, ovar us 2010 with fallopian cyst and  fibroids     Anxiety and depression 90's     ASCVD (arteriosclerotic cardiovascular disease) 12/2012    pos est, ct angio and cards fu, 25-49% lad     Breast cancer (H)      Chest tightness 12/2019    neg est echo     Chronic cystitis     Dr. Mcgrath     Chronic insomnia     for years, seen in sleep clinic 2016     Colonic polyp fu nl 2008    fu due 2013, fu done 2014 and nl, to fu 2019, fu nl 12/20     [place] (dyspnea on exertion)     est echo nl 2015, ct chest and pulm eval by Dr. Quiroga and no cause found     Ductal carcinoma in situ (DCIS) of right breast 2020    lumpectomy and xrt     Elevated TSH 11/2017     Hyperlipidaemia      Leg pain 2014    stopped lipitor     Lung nodules 01/2012    seen on coronary calcium ct, fu 1 year, fu done 12/12 and likely benign, fu done 2016 and no change, Dr. Quiroga     MVP (mitral valve prolapse) 2013    Had it surgically repaired, Robotic, done [place] 6/13 seen by cardiology 2011, echo 2007 with mild mr and nl lv fxn, fu echo 12/11 with lv size upper limits of nl, nl ef, no wma, mild lae, mvp and mild mr present     Palpitations      Screening 01/2012    cor ct score 0       Past Surgical History:   Procedure Laterality Date     BIOPSY BREAST       COLONOSCOPY WITH CO2 INSUFFLATION N/A 12/30/2020    Procedure: COLONOSCOPY, WITH CO2 INSUFFLATION;  Surgeon: Tera Luu MD;  Location: [facility] OR     HERNIA REPAIR       LUMPECTOMY BREAST       LUMPECTOMY BREAST WITH SEED LOCALIZATION Right 7/8/2020    Procedure: SEED LOCALIZED RIGHT BREAST LUMPECTOMY;  Surgeon: Elise Slater MD;  Location: [facility] OR     REPAIR VALVE MITRAL  6/13    done at [place], robotic       Family History   Problem Relation Age of Onset     Hypertension Father        Social History     Tobacco Use     Smoking status: Never Smoker     Smokeless tobacco: Never Used   Substance Use Topics     Alcohol use: Yes     Alcohol/week: 0.0 standard drinks     Comment: 1-2/week        Patient Active Problem List   Diagnosis      Colonic polyp     Lung nodules     ASCVD (arteriosclerotic cardiovascular disease)     Advanced directives, counseling/discussion     MVP (mitral valve prolapse)     Hyperlipidemia LDL goal <100     Chronic cystitis     Anxiety     [place] (dyspnea on exertion)     Recurrent major depressive disorder, in partial remission (H)     Insomnia, unspecified type     Elevated TSH     Ductal carcinoma in situ (DCIS) of right breast     Malignant neoplasm of upper-inner quadrant of right breast in female, estrogen receptor negative (H)     Allergies   Allergen Reactions     Albumin (Human) Difficulty breathing     Atorvastatin Muscle Pain (Myalgia)     Erythromycin      GI upset, intolerance     Current Outpatient Medications   Medication Sig Dispense Refill     ALPRAZolam (XANAX) 1 MG tablet TAKE 1 TABLET BY MOUTH EVERY DAY AT BEDTIME AS NEEDED FOR ANXIETY 60 tablet 0     amoxicillin (AMOXIL) 500 MG tablet Take 2,000 mg by mouth daily as needed (prior to dental appointments)       aspirin 81 MG tablet Take 1 tablet by mouth daily. 1 tablet 3     buPROPion (WELLBUTRIN XL) 150 MG 24 hr tablet Take 1 of the 150mg along with one of the 300mg tablets for total daily dose of 450mg 90 tablet 3     buPROPion (WELLBUTRIN XL) 300 MG 24 hr tablet TAKE 1 TABLET BY MOUTH EVERY DAY ALONG WITH THE 150MG TABLET 90 tablet 3     diphenhydrAMINE-APAP, sleep, (TYLENOL PM EXTRA STRENGTH PO) Take 2 tablets by mouth every evening        ibuprofen (ADVIL) 200 MG tablet Take 400 mg by mouth every 4 hours as needed       Ibuprofen-Diphenhydramine Cit (IBUPROFEN PM PO) Take 2 tablets by mouth every evening        melatonin 5 MG CAPS Take 6-7.5 mg by mouth At Bedtime        Multiple Vitamin (DAILY MULTIVITAMIN PO) Take 1 tablet by mouth daily.       rosuvastatin (CRESTOR) 20 MG tablet Take 1 tablet (20 mg) by mouth daily 90 tablet 3       EXAM:  GENERAL: healthy, alert and no distress   BREAST:  The breasts appear symmetric with no overlying skin changes.   The nipples are normal bilaterally.  There is no dimpling or thickening of the skin.well healed lumpectomy scar on the right breast.   No mass is appreciated in either breast.  The breast tissue is generally dense.  There is no axillary or supraclavicular lymphadenopathy.  CARDIOVASCULAR:  RRR  RESPIRATORY: nonlabored breathing  NECK: Neck supple. No adenopathy. Thyroid symmetric, normal size,, Carotids without bruits.  SKIN: No suspicious lesions or rashes  LYMPH: Normal cervical lymph nodes      ASSESSMENT/PLAN:  Madison Garcia is a 61yof with h/o right breast DCIS with a new area of concern on the left breast for possible DCIS. She will have her biopsy as scheduled on 8/2. I would like to see her back for a follow up visit to review pathology and discuss options.           Elise Slater MD  [facility]  [phone]        Please route or send letter to:  Primary Care Provider (PCP) and Referring Provider        Again, thank you for allowing me to participate in the care of your patient.        Sincerely,        Elise Slater MD

## 2021-07-22 NOTE — PROGRESS NOTES
[facility] Follow Up Note    CHIEF COMPLAINT:  H/o DCIS    HISTORY OF PRESENT ILLNESS:   Madison Garcia is a 60 year old female who is seen in follow up for right breast DCIS s/p lumpectomy and radiation.  She underwent a seed localized right breast lumpectomy on 7/8/2020 for ER/WA negative grade 3 DCIS. She then completed radiation under the care of Dr. Santillan. She had a right diagnostic mammogram with tomosynthesis on 1/18/2021 to reestablish a new baseline following surgery and radiation. It revealed post surgical changes in the right breast and loosely grouped coarse calcifications in the right upper inner breast for which 6 month follow up was recommended.     She had her 6 month mammogram on 7/21/2021 which unfortunately revealed new concerning calcifications on the left upper outer breast near a prior biopsy marker (2018 - fibroadenoma). She is scheduled for stereotactic biopsy on 8/2/2021.     REVIEW OF SYSTEMS:  Constitutional:  Negative for chills, fatigue, fever and weight change.  Eyes:  Negative for blurred vision, eye pain and photophobia.  ENT:  Negative for hearing problems, ENT pain, congestion, rhinorrhea, epistaxis, hoarseness and dental problems.  Cardiovascular:  Negative for chest pain, palpitations, tachycardia, orthopnea and edema.  Respiratory:  Negative for cough, dyspnea and hemoptysis.  Gastrointestinal:  Negative for abdominal pain, heartburn, constipation, diarrhea and stool changes.  Musculoskeletal:  Negative for arthralgias, back pain and myalgias.  Integumentary/Breast:  See HPI.    Past Medical History:   Diagnosis Date     Abdominal pain 2008 and 2010    ct neg 2008, ovar us 2010 with fallopian cyst and fibroids     Anxiety and depression 90's     ASCVD (arteriosclerotic cardiovascular disease) 12/2012    pos est, ct angio and cards fu, 25-49% lad     Breast cancer (H)      Chest tightness 12/2019    neg est echo     Chronic cystitis     Dr. Mcgrath     Chronic  insomnia     for years, seen in sleep clinic 2016     Colonic polyp fu nl 2008    fu due 2013, fu done 2014 and nl, to fu 2019, fu nl 12/20     [place] (dyspnea on exertion)     est echo nl 2015, ct chest and pulm eval by Dr. Quiroga and no cause found     Ductal carcinoma in situ (DCIS) of right breast 2020    lumpectomy and xrt     Elevated TSH 11/2017     Hyperlipidaemia      Leg pain 2014    stopped lipitor     Lung nodules 01/2012    seen on coronary calcium ct, fu 1 year, fu done 12/12 and likely benign, fu done 2016 and no change, Dr. Quiroga     MVP (mitral valve prolapse) 2013    Had it surgically repaired, Robotic, done [place] 6/13 seen by cardiology 2011, echo 2007 with mild mr and nl lv fxn, fu echo 12/11 with lv size upper limits of nl, nl ef, no wma, mild lae, mvp and mild mr present     Palpitations      Screening 01/2012    cor ct score 0       Past Surgical History:   Procedure Laterality Date     BIOPSY BREAST       COLONOSCOPY WITH CO2 INSUFFLATION N/A 12/30/2020    Procedure: COLONOSCOPY, WITH CO2 INSUFFLATION;  Surgeon: Tera Luu MD;  Location: [facility] OR     HERNIA REPAIR       LUMPECTOMY BREAST       LUMPECTOMY BREAST WITH SEED LOCALIZATION Right 7/8/2020    Procedure: SEED LOCALIZED RIGHT BREAST LUMPECTOMY;  Surgeon: Elise Slater MD;  Location: [facility] OR     REPAIR VALVE MITRAL  6/13    done at [place], robotic       Family History   Problem Relation Age of Onset     Hypertension Father        Social History     Tobacco Use     Smoking status: Never Smoker     Smokeless tobacco: Never Used   Substance Use Topics     Alcohol use: Yes     Alcohol/week: 0.0 standard drinks     Comment: 1-2/week        Patient Active Problem List   Diagnosis     Colonic polyp     Lung nodules     ASCVD (arteriosclerotic cardiovascular disease)     Advanced directives, counseling/discussion     MVP (mitral valve prolapse)     Hyperlipidemia LDL goal <100     Chronic cystitis     Anxiety     [place] (dyspnea on exertion)      Recurrent major depressive disorder, in partial remission (H)     Insomnia, unspecified type     Elevated TSH     Ductal carcinoma in situ (DCIS) of right breast     Malignant neoplasm of upper-inner quadrant of right breast in female, estrogen receptor negative (H)     Allergies   Allergen Reactions     Albumin (Human) Difficulty breathing     Atorvastatin Muscle Pain (Myalgia)     Erythromycin      GI upset, intolerance     Current Outpatient Medications   Medication Sig Dispense Refill     ALPRAZolam (XANAX) 1 MG tablet TAKE 1 TABLET BY MOUTH EVERY DAY AT BEDTIME AS NEEDED FOR ANXIETY 60 tablet 0     amoxicillin (AMOXIL) 500 MG tablet Take 2,000 mg by mouth daily as needed (prior to dental appointments)       aspirin 81 MG tablet Take 1 tablet by mouth daily. 1 tablet 3     buPROPion (WELLBUTRIN XL) 150 MG 24 hr tablet Take 1 of the 150mg along with one of the 300mg tablets for total daily dose of 450mg 90 tablet 3     buPROPion (WELLBUTRIN XL) 300 MG 24 hr tablet TAKE 1 TABLET BY MOUTH EVERY DAY ALONG WITH THE 150MG TABLET 90 tablet 3     diphenhydrAMINE-APAP, sleep, (TYLENOL PM EXTRA STRENGTH PO) Take 2 tablets by mouth every evening        ibuprofen (ADVIL) 200 MG tablet Take 400 mg by mouth every 4 hours as needed       Ibuprofen-Diphenhydramine Cit (IBUPROFEN PM PO) Take 2 tablets by mouth every evening        melatonin 5 MG CAPS Take 6-7.5 mg by mouth At Bedtime        Multiple Vitamin (DAILY MULTIVITAMIN PO) Take 1 tablet by mouth daily.       rosuvastatin (CRESTOR) 20 MG tablet Take 1 tablet (20 mg) by mouth daily 90 tablet 3       EXAM:  GENERAL: healthy, alert and no distress   BREAST:  The breasts appear symmetric with no overlying skin changes.  The nipples are normal bilaterally.  There is no dimpling or thickening of the skin.well healed lumpectomy scar on the right breast.   No mass is appreciated in either breast.  The breast tissue is generally dense.  There is no axillary or supraclavicular  lymphadenopathy.  CARDIOVASCULAR:  RRR  RESPIRATORY: nonlabored breathing  NECK: Neck supple. No adenopathy. Thyroid symmetric, normal size,, Carotids without bruits.  SKIN: No suspicious lesions or rashes  LYMPH: Normal cervical lymph nodes      ASSESSMENT/PLAN:  Madison Garcia is a 61yof with h/o right breast DCIS with a new area of concern on the left breast for possible DCIS. She will have her biopsy as scheduled on 8/2. I would like to see her back for a follow up visit to review pathology and discuss options.           Elise Slater MD  [facility]  [phone]        Please route or send letter to:  Primary Care Provider (PCP) and Referring Provider

## 2021-08-02 ENCOUNTER — ANCILLARY PROCEDURE (OUTPATIENT)
Dept: MAMMOGRAPHY | Facility: CLINIC | Age: 61
End: 2021-08-02
Attending: SURGERY
Payer: COMMERCIAL

## 2021-08-02 DIAGNOSIS — F41.9 ANXIETY: ICD-10-CM

## 2021-08-02 DIAGNOSIS — R92.8 ABNORMAL FINDING ON BREAST IMAGING: Primary | ICD-10-CM

## 2021-08-02 DIAGNOSIS — R92.8 BI-RADS CATEGORY 3 MAMMOGRAM RESULT: ICD-10-CM

## 2021-08-02 PROCEDURE — 19081 BX BREAST 1ST LESION STRTCTC: CPT | Mod: LT | Performed by: RADIOLOGY

## 2021-08-02 RX ORDER — LIDOCAINE HYDROCHLORIDE AND EPINEPHRINE 10; 10 MG/ML; UG/ML
1 INJECTION, SOLUTION INFILTRATION; PERINEURAL ONCE
Status: COMPLETED | OUTPATIENT
Start: 2021-08-02 | End: 2021-08-02

## 2021-08-02 RX ADMIN — LIDOCAINE HYDROCHLORIDE AND EPINEPHRINE 1 ML: 10; 10 INJECTION, SOLUTION INFILTRATION; PERINEURAL at 09:26

## 2021-08-03 LAB
PATH REPORT.COMMENTS IMP SPEC: NORMAL
PATH REPORT.COMMENTS IMP SPEC: NORMAL
PATH REPORT.FINAL DX SPEC: NORMAL
PATH REPORT.GROSS SPEC: NORMAL
PATH REPORT.MICROSCOPIC SPEC OTHER STN: NORMAL
PATH REPORT.RELEVANT HX SPEC: NORMAL
PHOTO IMAGE: NORMAL

## 2021-08-03 PROCEDURE — 88305 TISSUE EXAM BY PATHOLOGIST: CPT | Performed by: RADIOLOGY

## 2021-08-03 RX ORDER — ALPRAZOLAM 1 MG
TABLET ORAL
Qty: 30 TABLET | Refills: 0 | Status: SHIPPED | OUTPATIENT
Start: 2021-08-03 | End: 2021-09-03

## 2021-08-03 NOTE — TELEPHONE ENCOUNTER
Pt states her pharmacy send her a message that rx for alprazolam was denied. She would like to get more information why.     Per chart review, Alprazolam Rx was approved today. [facility] pharmacy was called and triage verified Rx was received.

## 2021-08-03 NOTE — TELEPHONE ENCOUNTER
ALPRAZolam (XANAX) 1 MG tablet 60 tablet 0 6/4/2021  No   Sig: TAKE 1 TABLET BY MOUTH EVERY DAY AT BEDTIME AS NEEDED FOR ANXIETY   Sent to pharmacy as: ALPRAZolam 1 MG Oral Tablet (XANAX)   Class: E-Prescribe   Order: [id number]   E-Prescribing Status: Receipt confirmed by pharmacy (6/4/2021 11:51 AM CDT)     Last visit 4/8/21    Routing refill request to provider for review/approval because:  Drug not on the [facility] refill protocol     Ni MANJARREZ RN

## 2021-08-04 ENCOUNTER — TELEPHONE (OUTPATIENT)
Dept: GENERAL RADIOLOGY | Facility: CLINIC | Age: 61
End: 2021-08-04

## 2021-08-04 NOTE — TELEPHONE ENCOUNTER
Spoke with patient regarding left breast biopsy results, which indicate benign fibroadenomatoid nodule with calcifications, no evidence of atypia or malignancy. Notified patient that the radiologist's recommendation is a six month follow-up right breast mammogram and then return to routine screening. Patient verbalized understanding of these results and all questions answered to her satisfaction.

## 2021-08-12 ENCOUNTER — OFFICE VISIT (OUTPATIENT)
Dept: SURGERY | Facility: CLINIC | Age: 61
End: 2021-08-12
Payer: COMMERCIAL

## 2021-08-12 DIAGNOSIS — R92.1 CALCIFICATION OF BOTH BREASTS ON MAMMOGRAPHY: Primary | ICD-10-CM

## 2021-08-12 PROCEDURE — 99212 OFFICE O/P EST SF 10 MIN: CPT | Performed by: SURGERY

## 2021-08-12 NOTE — PROGRESS NOTES
[facility] Follow Up Note    CHIEF COMPLAINT:  H/o right breast DCIS    HISTORY OF PRESENT ILLNESS: Madison Garcia is a 60 year old female who is seen in follow up for RIGHT breast DCIS s/p lumpectomy and radiation.  She underwent a seed localized right breast lumpectomy on 7/8/2020 for ER/GA negative grade 3 DCIS. She then completed radiation under the care of Dr. Santillan. She had a right diagnostic mammogram with tomosynthesis on 1/18/2021 to reestablish a new baseline following surgery and radiation. It revealed post surgical changes in the right breast and loosely grouped coarse calcifications in the right upper inner breast for which 6 month follow up was recommended.      She had her 6 month mammogram on 7/21/2021 which unfortunately revealed new concerning calcifications on the LEFT upper outer breast near a prior biopsy marker (2018 - fibroadenoma). She underwent stereotactic biopsy on 8/2/2021 and is here to discuss her pathology results. She reports more bruising after this biopsy. Her biopsy revealed benign findings with fibroadenomatioid chaange with stomal and luminal calcifications. No atypia or malignancy. Radiologist recommend a 6 month follow up right breast mammogram and return to annual imaging if benign.        REVIEW OF SYSTEMS:  Constitutional:  Negative for chills, fatigue, fever and weight change.  Eyes:  Negative for blurred vision, eye pain and photophobia.  ENT:  Negative for hearing problems, ENT pain, congestion, rhinorrhea, epistaxis, hoarseness and dental problems.  Cardiovascular:  Negative for chest pain, palpitations, tachycardia, orthopnea and edema.  Respiratory:  Negative for cough, dyspnea and hemoptysis.  Gastrointestinal:  Negative for abdominal pain, heartburn, constipation, diarrhea and stool changes.  Musculoskeletal:  Negative for arthralgias, back pain and myalgias.  Integumentary/Breast:  See HPI.    Past Medical History:   Diagnosis Date     Abdominal  pain 2008 and 2010    ct neg 2008, ovar us 2010 with fallopian cyst and fibroids     Anxiety and depression 90's     ASCVD (arteriosclerotic cardiovascular disease) 12/2012    pos est, ct angio and cards fu, 25-49% lad     Breast cancer (H)      Chest tightness 12/2019    neg est echo     Chronic cystitis     Dr. Mcgrath     Chronic insomnia     for years, seen in sleep clinic 2016     Colonic polyp fu nl 2008    fu due 2013, fu done 2014 and nl, to fu 2019, fu nl 12/20     [place] (dyspnea on exertion)     est echo nl 2015, ct chest and pulm eval by Dr. Quiroga and no cause found     Ductal carcinoma in situ (DCIS) of right breast 2020    lumpectomy and xrt     Elevated TSH 11/2017     Hyperlipidaemia      Leg pain 2014    stopped lipitor     Lung nodules 01/2012    seen on coronary calcium ct, fu 1 year, fu done 12/12 and likely benign, fu done 2016 and no change, Dr. Quiroga     MVP (mitral valve prolapse) 2013    Had it surgically repaired, Robotic, done [place] 6/13 seen by cardiology 2011, echo 2007 with mild mr and nl lv fxn, fu echo 12/11 with lv size upper limits of nl, nl ef, no wma, mild lae, mvp and mild mr present     Palpitations      Screening 01/2012    cor ct score 0       Past Surgical History:   Procedure Laterality Date     BIOPSY BREAST       COLONOSCOPY WITH CO2 INSUFFLATION N/A 12/30/2020    Procedure: COLONOSCOPY, WITH CO2 INSUFFLATION;  Surgeon: Tera Luu MD;  Location: [facility] OR     HERNIA REPAIR       LUMPECTOMY BREAST       LUMPECTOMY BREAST WITH SEED LOCALIZATION Right 7/8/2020    Procedure: SEED LOCALIZED RIGHT BREAST LUMPECTOMY;  Surgeon: Elise Slater MD;  Location: [facility] OR     REPAIR VALVE MITRAL  6/13    done at [place], robotic       Family History   Problem Relation Age of Onset     Hypertension Father        Social History     Tobacco Use     Smoking status: Never Smoker     Smokeless tobacco: Never Used   Substance Use Topics     Alcohol use: Yes     Alcohol/week: 0.0 standard drinks      Comment: 1-2/week        Patient Active Problem List   Diagnosis     Colonic polyp     Lung nodules     ASCVD (arteriosclerotic cardiovascular disease)     Advanced directives, counseling/discussion     MVP (mitral valve prolapse)     Hyperlipidemia LDL goal <100     Chronic cystitis     Anxiety     [place] (dyspnea on exertion)     Recurrent major depressive disorder, in partial remission (H)     Insomnia, unspecified type     Elevated TSH     Ductal carcinoma in situ (DCIS) of right breast     Malignant neoplasm of upper-inner quadrant of right breast in female, estrogen receptor negative (H)     Allergies   Allergen Reactions     Albumin (Human) Difficulty breathing     Atorvastatin Muscle Pain (Myalgia)     Erythromycin      GI upset, intolerance     Current Outpatient Medications   Medication Sig Dispense Refill     ALPRAZolam (XANAX) 1 MG tablet TAKE 1 TABLET BY MOUTH EVERY DAY AT BEDTIME AS NEEDED FOR ANXIETY 30 tablet 0     amoxicillin (AMOXIL) 500 MG tablet Take 2,000 mg by mouth daily as needed (prior to dental appointments)       aspirin 81 MG tablet Take 1 tablet by mouth daily. 1 tablet 3     buPROPion (WELLBUTRIN XL) 150 MG 24 hr tablet Take 1 of the 150mg along with one of the 300mg tablets for total daily dose of 450mg 90 tablet 3     buPROPion (WELLBUTRIN XL) 300 MG 24 hr tablet TAKE 1 TABLET BY MOUTH EVERY DAY ALONG WITH THE 150MG TABLET 90 tablet 3     diphenhydrAMINE-APAP, sleep, (TYLENOL PM EXTRA STRENGTH PO) Take 2 tablets by mouth every evening        ibuprofen (ADVIL) 200 MG tablet Take 400 mg by mouth every 4 hours as needed       Ibuprofen-Diphenhydramine Cit (IBUPROFEN PM PO) Take 2 tablets by mouth every evening        melatonin 5 MG CAPS Take 6-7.5 mg by mouth At Bedtime        Multiple Vitamin (DAILY MULTIVITAMIN PO) Take 1 tablet by mouth daily.       rosuvastatin (CRESTOR) 20 MG tablet Take 1 tablet (20 mg) by mouth daily 90 tablet 3         ASSESSMENT/PLAN:  Madison Garcia is here to  discuss her recent left breast biopsy. We reviewed her pathology and it is benign. Given the multiple callbacks for additional imaging and biopsy bilaterally, I would recommend she proceed with a bilateral diagnostic mammogram in 6 months. If benign at that point, would recommend return to annual mammography. No indication for further MR imaging from my perspective for screening purposes. I will see her back after her mammogram in 6 months for clinical breast exam.       Elise Slater MD  [facility]  [phone]        Please route or send letter to:  Primary Care Provider (PCP) and Referring Provider

## 2021-08-12 NOTE — NURSING NOTE
Madison Garcia's goals for this visit include:   Chief Complaint   Patient presents with     RECHECK     discuss imaging results       She requests these members of her care team be copied on today's visit information: no    PCP: Tera Keith    Referring Provider:  No referring provider defined for this encounter.    There were no vitals taken for this visit.    Do you need any medication refills at today's visit? No    Radha Phillips LPN

## 2021-08-12 NOTE — LETTER
[facility]    [address], [address]  [place], [place] [address]  Phone [phone]  Fax [phone]    [address], [address]  [place] [address]  Phone [phone]  Fax [phone]    [email or web address]     2021    Re: Madison Garcia  : 1960      [facility] Follow Up Note     CHIEF COMPLAINT:  H/o right breast DCIS     HISTORY OF PRESENT ILLNESS: Madison Garcia is a 60 year old female who is seen in follow up for RIGHT breast DCIS s/p lumpectomy and radiation.  She underwent a seed localized right breast lumpectomy on 2020 for ER/DE negative grade 3 DCIS. She then completed radiation under the care of Dr. Santillan. She had a right diagnostic mammogram with tomosynthesis on 2021 to reestablish a new baseline following surgery and radiation. It revealed post surgical changes in the right breast and loosely grouped coarse calcifications in the right upper inner breast for which 6 month follow up was recommended.      She had her 6 month mammogram on 2021 which unfortunately revealed new concerning calcifications on the LEFT upper outer breast near a prior biopsy marker (2018 - fibroadenoma). She underwent stereotactic biopsy on 2021 and is here to discuss her pathology results. She reports more bruising after this biopsy. Her biopsy revealed benign findings with fibroadenomatioid chaange with stomal and luminal calcifications. No atypia or malignancy. Radiologist recommend a 6 month follow up right breast mammogram and return to annual imaging if benign.         REVIEW OF SYSTEMS:  Constitutional:  Negative for chills, fatigue, fever and weight change.  Eyes:  Negative for blurred vision, eye pain and photophobia.  ENT:  Negative for hearing problems, ENT pain, congestion, rhinorrhea, epistaxis, hoarseness and dental problems.  Cardiovascular:  Negative for chest pain,  palpitations, tachycardia, orthopnea and edema.  Respiratory:  Negative for cough, dyspnea and hemoptysis.  Gastrointestinal:  Negative for abdominal pain, heartburn, constipation, diarrhea and stool changes.  Musculoskeletal:  Negative for arthralgias, back pain and myalgias.  Integumentary/Breast:  See HPI.     Past Medical History        Past Medical History:   Diagnosis Date     Abdominal pain 2008 and 2010     ct neg 2008, ovar us 2010 with fallopian cyst and fibroids     Anxiety and depression 90's     ASCVD (arteriosclerotic cardiovascular disease) 12/2012     pos est, ct angio and cards fu, 25-49% lad     Breast cancer (H)       Chest tightness 12/2019     neg est echo     Chronic cystitis       Dr. Mcgrath     Chronic insomnia       for years, seen in sleep clinic 2016     Colonic polyp fu nl 2008     fu due 2013, fu done 2014 and nl, to fu 2019, fu nl 12/20     [place] (dyspnea on exertion)       est echo nl 2015, ct chest and pulm eval by Dr. Quiroga and no cause found     Ductal carcinoma in situ (DCIS) of right breast 2020     lumpectomy and xrt     Elevated TSH 11/2017     Hyperlipidaemia       Leg pain 2014     stopped lipitor     Lung nodules 01/2012     seen on coronary calcium ct, fu 1 year, fu done 12/12 and likely benign, fu done 2016 and no change, Dr. Quiroga     MVP (mitral valve prolapse) 2013     Had it surgically repaired, Robotic, done [place] 6/13 seen by cardiology 2011, echo 2007 with mild mr and nl lv fxn, fu echo 12/11 with lv size upper limits of nl, nl ef, no wma, mild lae, mvp and mild mr present     Palpitations       Screening 01/2012     cor ct score 0            Past Surgical History         Past Surgical History:   Procedure Laterality Date     BIOPSY BREAST         COLONOSCOPY WITH CO2 INSUFFLATION N/A 12/30/2020     Procedure: COLONOSCOPY, WITH CO2 INSUFFLATION;  Surgeon: Tera Luu MD;  Location: [facility] OR     HERNIA REPAIR         LUMPECTOMY BREAST         LUMPECTOMY BREAST WITH  SEED LOCALIZATION Right 7/8/2020     Procedure: SEED LOCALIZED RIGHT BREAST LUMPECTOMY;  Surgeon: Elise Slater MD;  Location: [facility] OR     REPAIR VALVE MITRAL   6/13     done at [facility], robotic            Family History         Family History   Problem Relation Age of Onset     Hypertension Father              Social History            Tobacco Use     Smoking status: Never Smoker     Smokeless tobacco: Never Used   Substance Use Topics     Alcohol use: Yes       Alcohol/week: 0.0 standard drinks       Comment: 1-2/week              Patient Active Problem List   Diagnosis     Colonic polyp     Lung nodules     ASCVD (arteriosclerotic cardiovascular disease)     Advanced directives, counseling/discussion     MVP (mitral valve prolapse)     Hyperlipidemia LDL goal <100     Chronic cystitis     Anxiety     [place] (dyspnea on exertion)     Recurrent major depressive disorder, in partial remission (H)     Insomnia, unspecified type     Elevated TSH     Ductal carcinoma in situ (DCIS) of right breast     Malignant neoplasm of upper-inner quadrant of right breast in female, estrogen receptor negative (H)            Allergies   Allergen Reactions     Albumin (Human) Difficulty breathing     Atorvastatin Muscle Pain (Myalgia)     Erythromycin         GI upset, intolerance      Current Outpatient Prescriptions          Current Outpatient Medications   Medication Sig Dispense Refill     ALPRAZolam (XANAX) 1 MG tablet TAKE 1 TABLET BY MOUTH EVERY DAY AT BEDTIME AS NEEDED FOR ANXIETY 30 tablet 0     amoxicillin (AMOXIL) 500 MG tablet Take 2,000 mg by mouth daily as needed (prior to dental appointments)         aspirin 81 MG tablet Take 1 tablet by mouth daily. 1 tablet 3     buPROPion (WELLBUTRIN XL) 150 MG 24 hr tablet Take 1 of the 150mg along with one of the 300mg tablets for total daily dose of 450mg 90 tablet 3     buPROPion (WELLBUTRIN XL) 300 MG 24 hr tablet TAKE 1 TABLET BY MOUTH EVERY DAY ALONG WITH THE 150MG TABLET 90  tablet 3     diphenhydrAMINE-APAP, sleep, (TYLENOL PM EXTRA STRENGTH PO) Take 2 tablets by mouth every evening          ibuprofen (ADVIL) 200 MG tablet Take 400 mg by mouth every 4 hours as needed         Ibuprofen-Diphenhydramine Cit (IBUPROFEN PM PO) Take 2 tablets by mouth every evening          melatonin 5 MG CAPS Take 6-7.5 mg by mouth At Bedtime          Multiple Vitamin (DAILY MULTIVITAMIN PO) Take 1 tablet by mouth daily.         rosuvastatin (CRESTOR) 20 MG tablet Take 1 tablet (20 mg) by mouth daily 90 tablet 3               ASSESSMENT/PLAN:  Madison Garcia is here to discuss her recent left breast biopsy. We reviewed her pathology and it is benign. Given the multiple callbacks for additional imaging and biopsy bilaterally, I would recommend she proceed with a bilateral diagnostic mammogram in 6 months. If benign at that point, would recommend return to annual mammography. No indication for further MR imaging from my perspective for screening purposes. I will see her back after her mammogram in 6 months for clinical breast exam.         Elise Slater MD  [facility]  [phone]

## 2021-08-12 NOTE — LETTER
8/12/2021         RE: Madison Garcia  [address] [address] [place] [id number]        Dear Colleague,    Thank you for referring your patient, Madison Garcia, to the [facility]. Please see a copy of my visit note below.    [facility] Follow Up Note    CHIEF COMPLAINT:  H/o right breast DCIS    HISTORY OF PRESENT ILLNESS: Madison Garcia is a 60 year old female who is seen in follow up for RIGHT breast DCIS s/p lumpectomy and radiation.  She underwent a seed localized right breast lumpectomy on 7/8/2020 for ER/OH negative grade 3 DCIS. She then completed radiation under the care of Dr. Santillan. She had a right diagnostic mammogram with tomosynthesis on 1/18/2021 to reestablish a new baseline following surgery and radiation. It revealed post surgical changes in the right breast and loosely grouped coarse calcifications in the right upper inner breast for which 6 month follow up was recommended.      She had her 6 month mammogram on 7/21/2021 which unfortunately revealed new concerning calcifications on the LEFT upper outer breast near a prior biopsy marker (2018 - fibroadenoma). She underwent stereotactic biopsy on 8/2/2021 and is here to discuss her pathology results. She reports more bruising after this biopsy. Her biopsy revealed benign findings with fibroadenomatioid chaange with stomal and luminal calcifications. No atypia or malignancy. Radiologist recommend a 6 month follow up right breast mammogram and return to annual imaging if benign.        REVIEW OF SYSTEMS:  Constitutional:  Negative for chills, fatigue, fever and weight change.  Eyes:  Negative for blurred vision, eye pain and photophobia.  ENT:  Negative for hearing problems, ENT pain, congestion, rhinorrhea, epistaxis, hoarseness and dental problems.  Cardiovascular:  Negative for chest pain, palpitations, tachycardia, orthopnea and edema.  Respiratory:  Negative for cough, dyspnea and  hemoptysis.  Gastrointestinal:  Negative for abdominal pain, heartburn, constipation, diarrhea and stool changes.  Musculoskeletal:  Negative for arthralgias, back pain and myalgias.  Integumentary/Breast:  See HPI.    Past Medical History:   Diagnosis Date     Abdominal pain 2008 and 2010    ct neg 2008, ovar us 2010 with fallopian cyst and fibroids     Anxiety and depression 90's     ASCVD (arteriosclerotic cardiovascular disease) 12/2012    pos est, ct angio and cards fu, 25-49% lad     Breast cancer (H)      Chest tightness 12/2019    neg est echo     Chronic cystitis     Dr. Mcgrath     Chronic insomnia     for years, seen in sleep clinic 2016     Colonic polyp fu nl 2008    fu due 2013, fu done 2014 and nl, to fu 2019, fu nl 12/20     [place] (dyspnea on exertion)     est echo nl 2015, ct chest and pulm eval by Dr. Quiroga and no cause found     Ductal carcinoma in situ (DCIS) of right breast 2020    lumpectomy and xrt     Elevated TSH 11/2017     Hyperlipidaemia      Leg pain 2014    stopped lipitor     Lung nodules 01/2012    seen on coronary calcium ct, fu 1 year, fu done 12/12 and likely benign, fu done 2016 and no change, Dr. Quiroga     MVP (mitral valve prolapse) 2013    Had it surgically repaired, Robotic, done [place] 6/13 seen by cardiology 2011, echo 2007 with mild mr and nl lv fxn, fu echo 12/11 with lv size upper limits of nl, nl ef, no wma, mild lae, mvp and mild mr present     Palpitations      Screening 01/2012    cor ct score 0       Past Surgical History:   Procedure Laterality Date     BIOPSY BREAST       COLONOSCOPY WITH CO2 INSUFFLATION N/A 12/30/2020    Procedure: COLONOSCOPY, WITH CO2 INSUFFLATION;  Surgeon: Tera Luu MD;  Location: [facility] OR     HERNIA REPAIR       LUMPECTOMY BREAST       LUMPECTOMY BREAST WITH SEED LOCALIZATION Right 7/8/2020    Procedure: SEED LOCALIZED RIGHT BREAST LUMPECTOMY;  Surgeon: Elise Slater MD;  Location:  OR     REPAIR VALVE MITRAL  6/13    done at [facility],  robotic       Family History   Problem Relation Age of Onset     Hypertension Father        Social History     Tobacco Use     Smoking status: Never Smoker     Smokeless tobacco: Never Used   Substance Use Topics     Alcohol use: Yes     Alcohol/week: 0.0 standard drinks     Comment: 1-2/week        Patient Active Problem List   Diagnosis     Colonic polyp     Lung nodules     ASCVD (arteriosclerotic cardiovascular disease)     Advanced directives, counseling/discussion     MVP (mitral valve prolapse)     Hyperlipidemia LDL goal <100     Chronic cystitis     Anxiety     [place] (dyspnea on exertion)     Recurrent major depressive disorder, in partial remission (H)     Insomnia, unspecified type     Elevated TSH     Ductal carcinoma in situ (DCIS) of right breast     Malignant neoplasm of upper-inner quadrant of right breast in female, estrogen receptor negative (H)     Allergies   Allergen Reactions     Albumin (Human) Difficulty breathing     Atorvastatin Muscle Pain (Myalgia)     Erythromycin      GI upset, intolerance     Current Outpatient Medications   Medication Sig Dispense Refill     ALPRAZolam (XANAX) 1 MG tablet TAKE 1 TABLET BY MOUTH EVERY DAY AT BEDTIME AS NEEDED FOR ANXIETY 30 tablet 0     amoxicillin (AMOXIL) 500 MG tablet Take 2,000 mg by mouth daily as needed (prior to dental appointments)       aspirin 81 MG tablet Take 1 tablet by mouth daily. 1 tablet 3     buPROPion (WELLBUTRIN XL) 150 MG 24 hr tablet Take 1 of the 150mg along with one of the 300mg tablets for total daily dose of 450mg 90 tablet 3     buPROPion (WELLBUTRIN XL) 300 MG 24 hr tablet TAKE 1 TABLET BY MOUTH EVERY DAY ALONG WITH THE 150MG TABLET 90 tablet 3     diphenhydrAMINE-APAP, sleep, (TYLENOL PM EXTRA STRENGTH PO) Take 2 tablets by mouth every evening        ibuprofen (ADVIL) 200 MG tablet Take 400 mg by mouth every 4 hours as needed       Ibuprofen-Diphenhydramine Cit (IBUPROFEN PM PO) Take 2 tablets by mouth every evening         melatonin 5 MG CAPS Take 6-7.5 mg by mouth At Bedtime        Multiple Vitamin (DAILY MULTIVITAMIN PO) Take 1 tablet by mouth daily.       rosuvastatin (CRESTOR) 20 MG tablet Take 1 tablet (20 mg) by mouth daily 90 tablet 3         ASSESSMENT/PLAN:  Madison Garcia is here to discuss her recent left breast biopsy. We reviewed her pathology and it is benign. Given the multiple callbacks for additional imaging and biopsy bilaterally, I would recommend she proceed with a bilateral diagnostic mammogram in 6 months. If benign at that point, would recommend return to annual mammography. No indication for further MR imaging from my perspective for screening purposes. I will see her back after her mammogram in 6 months for clinical breast exam.       Elise Slater MD  [facility]  [phone]        Please route or send letter to:  Primary Care Provider (PCP) and Referring Provider        Again, thank you for allowing me to participate in the care of your patient.        Sincerely,        Elise Slater MD

## 2021-09-03 DIAGNOSIS — F41.9 ANXIETY: ICD-10-CM

## 2021-09-03 RX ORDER — ALPRAZOLAM 1 MG
TABLET ORAL
Qty: 30 TABLET | Refills: 0 | Status: SHIPPED | OUTPATIENT
Start: 2021-09-03 | End: 2021-09-30

## 2021-09-03 NOTE — TELEPHONE ENCOUNTER
ALPRAZolam (XANAX) 1 MG tablet      Last Written Prescription Date:  8/03/21  Last Fill Quantity: 30 tablet,   # refills: 0  Last Office Visit: 12/10/2020 with Dr Keith  Future Office visit:       Routing refill request to provider for review/approval because:  Drug not on the [facility], [facility] or [facility] refill protocol or controlled substance

## 2021-09-04 ENCOUNTER — HEALTH MAINTENANCE LETTER (OUTPATIENT)
Age: 61
End: 2021-09-04

## 2021-09-10 ENCOUNTER — E-VISIT (OUTPATIENT)
Dept: URGENT CARE | Facility: URGENT CARE | Age: 61
End: 2021-09-10
Payer: COMMERCIAL

## 2021-09-10 DIAGNOSIS — J40 BRONCHITIS: Primary | ICD-10-CM

## 2021-09-10 DIAGNOSIS — J98.01 ACUTE BRONCHOSPASM: ICD-10-CM

## 2021-09-10 PROCEDURE — 99421 OL DIG E/M SVC 5-10 MIN: CPT | Performed by: PHYSICIAN ASSISTANT

## 2021-09-10 RX ORDER — ALBUTEROL SULFATE 90 UG/1
2 AEROSOL, METERED RESPIRATORY (INHALATION) EVERY 6 HOURS
Qty: 8.5 G | Refills: 0 | Status: SHIPPED | OUTPATIENT
Start: 2021-09-10 | End: 2021-12-21

## 2021-09-10 RX ORDER — PREDNISONE 20 MG/1
20 TABLET ORAL 2 TIMES DAILY
Qty: 10 TABLET | Refills: 0 | Status: SHIPPED | OUTPATIENT
Start: 2021-09-10 | End: 2021-12-21

## 2021-09-10 RX ORDER — DOXYCYCLINE 100 MG/1
100 CAPSULE ORAL 2 TIMES DAILY
Qty: 20 CAPSULE | Refills: 0 | Status: SHIPPED | OUTPATIENT
Start: 2021-09-10 | End: 2021-12-21

## 2021-10-19 ENCOUNTER — NURSE TRIAGE (OUTPATIENT)
Dept: FAMILY MEDICINE | Facility: CLINIC | Age: 61
End: 2021-10-19

## 2021-10-19 NOTE — TELEPHONE ENCOUNTER
"Patient called clinic with concern over what she thought was Bell's Palsy.  Pt described left side of her face numbness, weakness, difficulty closing left eye, trouble enunciating words, drooping on left side of smile, drooling out of left side of mouth.  No headache, no dizziness, not unsteady on her feet, no numbness or weakness in either arms or legs. Advised patient this was not necessarily Bell's Palsy and could be a stroke. Per Protocol advise patient to call 911 or have her  drive her immediately to nearest ER.  Patient agreed.     Shirin Garcia, MSN, RN   [facility] Triage    Reason for Disposition    New neurologic deficit that is present NOW, sudden onset of ANY of the following: * Weakness of the face, arm, or leg on one side of the body* Numbness of the face, arm, or leg on one side of the body* Loss of speech or garbled speech    Answer Assessment - Initial Assessment Questions  1. SYMPTOM: \"What is the main symptom you are concerned about?\" (e.g., weakness, numbness)    Left side of face numbness, some weakness.       2. ONSET: \"When did this start?\" (minutes, hours, days; while sleeping)    Slight symptoms began last night and were worse this am when she woke up.    3. LAST NORMAL: \"When was the last time you were normal (no symptoms)?\"    8 pm last night.        4. PATTERN \"Does this come and go, or has it been constant since it started?\"  \"Is it present now?\"    Constant, progressively worsening        5. CARDIAC SYMPTOMS: \"Have you had any of the following symptoms: chest pain, difficulty breathing, palpitations?\"        None    6. NEUROLOGIC SYMPTOMS: \"Have you had any of the following symptoms: headache, dizziness, vision loss, double vision, changes in speech, unsteady on your feet?\"    No headache, no dizziness, not unsteady on her feet, no numbness or weakness in either arms or legs.     Difficulty closing eye on one side of face, problems enunciating words, uneven smile , Drooling " "out of same side of mouth,     7. OTHER SYMPTOMS: \"Do you have any other symptoms?\"    See above    8. PREGNANCY: \"Is there any chance you are pregnant?\" \"When was your last menstrual period?\"      NA    Protocols used: NEUROLOGIC DEFICIT-A-OH      "

## 2021-10-28 DIAGNOSIS — F41.9 ANXIETY: ICD-10-CM

## 2021-10-29 RX ORDER — ALPRAZOLAM 1 MG
TABLET ORAL
Qty: 30 TABLET | Refills: 0 | Status: SHIPPED | OUTPATIENT
Start: 2021-10-29 | End: 2021-12-01

## 2021-10-29 NOTE — TELEPHONE ENCOUNTER
Routing refill request to provider for review/approval because:  Drug not on the [facility] refill protocol   Renetta Vanessa RN

## 2021-11-29 ENCOUNTER — MYC MEDICAL ADVICE (OUTPATIENT)
Dept: SURGERY | Facility: CLINIC | Age: 61
End: 2021-11-29
Payer: COMMERCIAL

## 2021-11-29 DIAGNOSIS — Z85.3 HISTORY OF MALIGNANT NEOPLASM OF RIGHT BREAST: ICD-10-CM

## 2021-11-29 DIAGNOSIS — D05.11 DUCTAL CARCINOMA IN SITU (DCIS) OF RIGHT BREAST: Primary | ICD-10-CM

## 2021-11-29 DIAGNOSIS — R22.31 LUMP OF AXILLA, RIGHT: ICD-10-CM

## 2021-11-29 NOTE — TELEPHONE ENCOUNTER
Patient has history of right breast DCIS, gr. 3 ER/IN (-),  in which she had a Right Breast Lumpectomy on 7/8/20 with Dr. Cottrell and Radiation under Dr. Santillan's care.    Last 6 month follow up diagnostic mammogram was done 7/21/21 and patient was called back for a stereo biopsy on 8/2/21 which was benign.      Patient is scheduled to return 2/2021 for diagnostic follow up imaging and clinic visit with Dr. Cottrell.    Informed patient I will check with Dr. Cottrell to advise on if she wants a diagnostic right mammo and right axilla ultrasound done prior to clinic visit.    Daxa Mcleod RN BSN  Breast Nurse Care Coordinator  [facility]- [facility] [place]  [phone]

## 2021-11-30 DIAGNOSIS — F41.9 ANXIETY: ICD-10-CM

## 2021-11-30 NOTE — TELEPHONE ENCOUNTER
Patient is now scheduled for right breast diagnostic mammogram and right breast/axilla US on 12/7/21 @ 8:00a.m., with check in time of 7:45a.m. at [facility].     I will check with Dr. Slater to see when we should schedule patient for a follow up.  Daxa Mcleod, RN, BSN

## 2021-11-30 NOTE — TELEPHONE ENCOUNTER
Orders placed for right breast diagnostic mammogram and right breast US.   Pending Dr. Slater's signature and I will assist in getting patient scheduled.  Daxa Mcleod, RN, BSN

## 2021-12-01 RX ORDER — ALPRAZOLAM 1 MG
TABLET ORAL
Qty: 30 TABLET | Refills: 0 | Status: SHIPPED | OUTPATIENT
Start: 2021-12-01 | End: 2022-01-04

## 2021-12-01 NOTE — TELEPHONE ENCOUNTER
Routing refill request to provider for review/approval because:  Drug not on the [facility] refill protocol     Pending Prescriptions:                       Disp   Refills    ALPRAZolam (XANAX) 1 MG tablet [Pharmacy M*30 tab*         Sig: TAKE 1 TABLET BY MOUTH EVERY DAY AT BEDTIME AS NEEDED           FOR ANXIETY    Last Written Prescription Date:  10/29/21  Last Fill Quantity: 30,  # refills: 0   Last office visit: 12/10/2020 with prescribing provider:  Dr. Keith.    Future Office Visit:   Next 5 appointments (look out 90 days)      Jan 04, 2022  7:30 AM  PHYSICAL with Tera Keith MD  [facility] ([facility] ) [address] [place] [address], [address] [address]  [phone]     Feb 24, 2022  1:00 PM  Return Visit with Elise Slater MD  [facility] ([facility]) [address] [place] [address]  [phone]             Siomara Metzger RN  [facility] Triage Nurse

## 2021-12-02 DIAGNOSIS — F33.41 RECURRENT MAJOR DEPRESSIVE DISORDER, IN PARTIAL REMISSION (H): Chronic | ICD-10-CM

## 2021-12-06 RX ORDER — BUPROPION HYDROCHLORIDE 300 MG/1
TABLET ORAL
Qty: 90 TABLET | Refills: 3 | Status: SHIPPED | OUTPATIENT
Start: 2021-12-06 | End: 2022-11-29

## 2021-12-06 RX ORDER — BUPROPION HYDROCHLORIDE 150 MG/1
TABLET ORAL
Qty: 90 TABLET | Refills: 3 | Status: SHIPPED | OUTPATIENT
Start: 2021-12-06 | End: 2022-11-29

## 2021-12-06 NOTE — TELEPHONE ENCOUNTER
Has appointment with Dr. Keith in 4 weeks.    Please refill as appropriate.  Thank you,    Theresa Hollingsworth RN  [facility]

## 2021-12-07 ENCOUNTER — HOSPITAL ENCOUNTER (OUTPATIENT)
Dept: MAMMOGRAPHY | Facility: CLINIC | Age: 61
End: 2021-12-07
Attending: SURGERY
Payer: COMMERCIAL

## 2021-12-07 DIAGNOSIS — R22.31 LUMP OF AXILLA, RIGHT: ICD-10-CM

## 2021-12-07 DIAGNOSIS — Z85.3 HISTORY OF MALIGNANT NEOPLASM OF RIGHT BREAST: ICD-10-CM

## 2021-12-07 PROCEDURE — 77061 BREAST TOMOSYNTHESIS UNI: CPT | Mod: RT

## 2021-12-07 PROCEDURE — 76882 US LMTD JT/FCL EVL NVASC XTR: CPT | Mod: RT

## 2021-12-08 ENCOUNTER — IMMUNIZATION (OUTPATIENT)
Dept: NURSING | Facility: CLINIC | Age: 61
End: 2021-12-08
Payer: COMMERCIAL

## 2021-12-08 PROCEDURE — 0004A PR COVID VAC PFIZER DIL RECON 30 MCG/0.3 ML IM: CPT

## 2021-12-08 PROCEDURE — 91300 PR COVID VAC PFIZER DIL RECON 30 MCG/0.3 ML IM: CPT

## 2021-12-21 ENCOUNTER — OFFICE VISIT (OUTPATIENT)
Dept: SURGERY | Facility: CLINIC | Age: 61
End: 2021-12-21
Payer: COMMERCIAL

## 2021-12-21 VITALS
HEART RATE: 83 BPM | SYSTOLIC BLOOD PRESSURE: 112 MMHG | HEIGHT: 69 IN | OXYGEN SATURATION: 99 % | BODY MASS INDEX: 24.44 KG/M2 | WEIGHT: 165 LBS | DIASTOLIC BLOOD PRESSURE: 70 MMHG

## 2021-12-21 DIAGNOSIS — M79.621 AXILLARY PAIN, RIGHT: Primary | ICD-10-CM

## 2021-12-21 DIAGNOSIS — Z12.31 VISIT FOR SCREENING MAMMOGRAM: ICD-10-CM

## 2021-12-21 PROCEDURE — 99213 OFFICE O/P EST LOW 20 MIN: CPT | Performed by: SURGERY

## 2021-12-21 ASSESSMENT — MIFFLIN-ST. JEOR: SCORE: 1379.88

## 2021-12-21 NOTE — PROGRESS NOTES
[facility] Follow Up Note    CHIEF COMPLAINT:  H/o right breast DCIS, axillary adenopathy    HISTORY OF PRESENT ILLNESS:  Madison Garcia is a 61 year old female who is seen in follow up for RIGHT breast DCIS s/p lumpectomy and radiation.  She underwent a seed localized right breast lumpectomy on 7/8/2020 for ER/MA negative grade 3 DCIS. She then completed radiation under the care of Dr. Santillan. She had a right diagnostic mammogram with tomosynthesis on 1/18/2021 to reestablish a new baseline following surgery and radiation. It revealed post surgical changes in the right breast and loosely grouped coarse calcifications in the right upper inner breast for which 6 month follow up was recommended.      She had her 6 month mammogram on 7/21/2021 which unfortunately revealed new concerning calcifications on the LEFT upper outer breast near a prior biopsy marker (2018 - fibroadenoma). She underwent stereotactic biopsy on 8/2/2021 which revealed benign findings.     She began to feel lumpiness in the right axilla about a month or so ago. She  had imaging with a right mammogram on 12/7/2021 and US of the axilla. Both were benign. She reports some tenderness in the axilla and along her prior minithoracotomy site for her heart surgery.      REVIEW OF SYSTEMS:  Constitutional:  Negative for chills, fatigue, fever and weight change.  Eyes:  Negative for blurred vision, eye pain and photophobia.  ENT:  Negative for hearing problems, ENT pain, congestion, rhinorrhea, epistaxis, hoarseness and dental problems.  Cardiovascular:  Negative for chest pain, palpitations, tachycardia, orthopnea and edema.  Respiratory:  Negative for cough, dyspnea and hemoptysis.  Gastrointestinal:  Negative for abdominal pain, heartburn, constipation, diarrhea and stool changes.  Musculoskeletal:  Negative for arthralgias, back pain and myalgias.  Integumentary/Breast:  See HPI.    Past Medical History:   Diagnosis Date     Abdominal  pain 2008 and 2010    ct neg 2008, ovar us 2010 with fallopian cyst and fibroids     Anxiety and depression 90's     ASCVD (arteriosclerotic cardiovascular disease) 12/2012    pos est, ct angio and cards fu, 25-49% lad     Breast cancer (H)      Chest tightness 12/2019    neg est echo     Chronic cystitis     Dr. Mcgrath     Chronic insomnia     for years, seen in sleep clinic 2016     Colonic polyp fu nl 2008    fu due 2013, fu done 2014 and nl, to fu 2019, fu nl 12/20     [place] (dyspnea on exertion)     est echo nl 2015, ct chest and pulm eval by Dr. Quiroga and no cause found     Ductal carcinoma in situ (DCIS) of right breast 2020    lumpectomy and xrt     Elevated TSH 11/2017     Hyperlipidaemia      Leg pain 2014    stopped lipitor     Lung nodules 01/2012    seen on coronary calcium ct, fu 1 year, fu done 12/12 and likely benign, fu done 2016 and no change, Dr. Quiroga     MVP (mitral valve prolapse) 2013    Had it surgically repaired, Robotic, done [facility] 6/13 seen by cardiology 2011, echo 2007 with mild mr and nl lv fxn, fu echo 12/11 with lv size upper limits of nl, nl ef, no wma, mild lae, mvp and mild mr present     Palpitations      Screening 01/2012    cor ct score 0       Past Surgical History:   Procedure Laterality Date     BIOPSY BREAST       COLONOSCOPY WITH CO2 INSUFFLATION N/A 12/30/2020    Procedure: COLONOSCOPY, WITH CO2 INSUFFLATION;  Surgeon: Tera Luu MD;  Location: [facility] OR     HERNIA REPAIR       LUMPECTOMY BREAST       LUMPECTOMY BREAST WITH SEED LOCALIZATION Right 7/8/2020    Procedure: SEED LOCALIZED RIGHT BREAST LUMPECTOMY;  Surgeon: Elise Slater MD;  Location: [facility] OR     REPAIR VALVE MITRAL  6/13    done at [facility], robotic       Family History   Problem Relation Age of Onset     Hypertension Father        Social History     Tobacco Use     Smoking status: Never Smoker     Smokeless tobacco: Never Used   Substance Use Topics     Alcohol use: Yes     Alcohol/week: 0.0 standard drinks      Comment: 1-2/week        Patient Active Problem List   Diagnosis     Colonic polyp     Lung nodules     ASCVD (arteriosclerotic cardiovascular disease)     Advanced directives, counseling/discussion     MVP (mitral valve prolapse)     Hyperlipidemia LDL goal <100     Chronic cystitis     Anxiety     [place] (dyspnea on exertion)     Recurrent major depressive disorder, in partial remission (H)     Insomnia, unspecified type     Elevated TSH     Ductal carcinoma in situ (DCIS) of right breast     Malignant neoplasm of upper-inner quadrant of right breast in female, estrogen receptor negative (H)     Allergies   Allergen Reactions     Albumin (Human) Difficulty breathing     Atorvastatin Muscle Pain (Myalgia)     Erythromycin      GI upset, intolerance     Current Outpatient Medications   Medication Sig Dispense Refill     albuterol (PROVENTIL HFA) 108 (90 Base) MCG/ACT inhaler Inhale 2 puffs into the lungs every 6 hours 8.5 g 0     ALPRAZolam (XANAX) 1 MG tablet TAKE 1 TABLET BY MOUTH EVERY DAY AT BEDTIME AS NEEDED FOR ANXIETY 30 tablet 0     amoxicillin (AMOXIL) 500 MG tablet Take 2,000 mg by mouth daily as needed (prior to dental appointments)       aspirin 81 MG tablet Take 1 tablet by mouth daily. 1 tablet 3     buPROPion (WELLBUTRIN XL) 150 MG 24 hr tablet Take 1 of the 150mg along with one of the 300mg tablets for total daily dose of 450mg 90 tablet 3     buPROPion (WELLBUTRIN XL) 300 MG 24 hr tablet TAKE 1 TABLET BY MOUTH EVERY DAY ALONG WITH THE 150MG TABLET 90 tablet 3     diphenhydrAMINE-APAP, sleep, (TYLENOL PM EXTRA STRENGTH PO) Take 2 tablets by mouth every evening        doxycycline monohydrate (MONODOX) 100 MG capsule Take 1 capsule (100 mg) by mouth 2 times daily 20 capsule 0     ibuprofen (ADVIL) 200 MG tablet Take 400 mg by mouth every 4 hours as needed       Ibuprofen-Diphenhydramine Cit (IBUPROFEN PM PO) Take 2 tablets by mouth every evening        melatonin 5 MG CAPS Take 6-7.5 mg by mouth At Bedtime         Multiple Vitamin (DAILY MULTIVITAMIN PO) Take 1 tablet by mouth daily.       predniSONE (DELTASONE) 20 MG tablet Take 1 tablet (20 mg) by mouth 2 times daily 10 tablet 0     rosuvastatin (CRESTOR) 20 MG tablet Take 1 tablet (20 mg) by mouth daily 90 tablet 3       EXAM:  GENERAL: healthy, alert and no distress   BREAST:  Right breast with lumpectomy scar at 12:00 and mini thoracotomy scar at 10:00 near the axilla. There is visible and palpable cording in the right axilla which extends down to the thoracotomy scar and then down laterally along her breast. This is tender with palpation. No other areas of concern on either breast.   There is no axillary or supraclavicular lymphadenopathy.  CARDIOVASCULAR:  RRR  RESPIRATORY: nonlabored breathing  NECK: Neck supple. No adenopathy. Thyroid symmetric, normal size,, Carotids without bruits.  SKIN: No suspicious lesions or rashes  LYMPH: Normal cervical lymph nodes      ASSESSMENT/PLAN:  Madison Garcia is a 61yof with h/o lumpectomy and radiation for right breast DCIS. She has cording in the axilla, presumably related to her prior mini thoracotomy scarring (no lymph node surgery done with her lumpectomy). We discussed axillary cording. I would recommend PT to address this. Referral placed.     She should have a bilateral 3D mammogram in July 2022 and see me if there are any areas of concern or for annual breast exam. She is working on establishing care with a new OB GYN.           Elise Slater MD  [facility]  [phone]        Please route or send letter to:  Primary Care Provider (PCP) and Referring Provider

## 2021-12-21 NOTE — NURSING NOTE
Breast Patients    BREAST PATIENTS (ALL)    1-Do you have any of the following symptoms? Other: right axilla  2-In which breast are you having the symptoms? right  3-Have you had a Mammogram? Cristian Enriquez - Date:  12/7/21  4-Have you ever had a breast cyst drained? No  5-Have you ever had a breast biopsy? Yes: both, multiple times  6-Have you ever had a Breast Cancer? Yes:  Right  -  Date:  7/2020   7-Is there a history of Breast Cancer in your family? Yes   Relationship to you:    Grandmother  8-Have you ever had Ovarian Cancer? No  9-Is there a history of Ovarian Cancer in your family? No  10-Summarize your caffeine intake (i.e. coffee, tea, chocolate, soda etc.): 1-2 daily    BREAST PATIENTS (FEMALE)    11-What age did your periods begin? 15  12-Date your last menstrual period began? 2010  13-Number of full-term pregnancies: 0  14-Your age when your first child was born? N/A  15-Did you nurse your children? N/A  16-Are you pregnant now? No  17-Have you begun menopause? Yes  Age Menopause began:  2010  18-Have you had either ovary removed?No  19-Do you have breast implants? No   20-Do you use hormone replacement therapy?  Yes  Type: vagifem/estrace    21-Have you taken oral contraceptive pills?  Yes, For how many years?  1  22-Have you had an intrauterine device (IUD) placed?  No  23-What is your current bra size?  38 D    Nitza Drew RN-BSN

## 2021-12-28 DIAGNOSIS — I25.10 CORONARY ARTERY DISEASE DUE TO LIPID RICH PLAQUE: ICD-10-CM

## 2021-12-28 DIAGNOSIS — I25.83 CORONARY ARTERY DISEASE DUE TO LIPID RICH PLAQUE: ICD-10-CM

## 2021-12-30 NOTE — TELEPHONE ENCOUNTER
LDL Cholesterol Calculated   Date Value Ref Range Status   12/10/2020 76 <100 mg/dL Final     Comment:     Desirable:       <100 mg/dl     LOV 12/10/20.    Needs appointment for further refills.    Please call patient to schedule appointment.  Thank you,    Theresa Hollingsworth RN  [facility]

## 2021-12-31 DIAGNOSIS — F41.9 ANXIETY: ICD-10-CM

## 2022-01-02 ASSESSMENT — ENCOUNTER SYMPTOMS
HEMATOCHEZIA: 0
EYE PAIN: 0
BREAST MASS: 0
ABDOMINAL PAIN: 0
SORE THROAT: 0
JOINT SWELLING: 1
PARESTHESIAS: 0
HEMATURIA: 0
PALPITATIONS: 0
NAUSEA: 0
WEAKNESS: 0
NERVOUS/ANXIOUS: 0
MYALGIAS: 0
CONSTIPATION: 0
ARTHRALGIAS: 1
HEARTBURN: 0
DIARRHEA: 0
COUGH: 0
DIZZINESS: 0
SHORTNESS OF BREATH: 0
DYSURIA: 0
FREQUENCY: 0
CHILLS: 0
HEADACHES: 0
FEVER: 0

## 2022-01-04 ENCOUNTER — OFFICE VISIT (OUTPATIENT)
Dept: FAMILY MEDICINE | Facility: CLINIC | Age: 62
End: 2022-01-04
Payer: COMMERCIAL

## 2022-01-04 VITALS
HEIGHT: 69 IN | WEIGHT: 170.2 LBS | BODY MASS INDEX: 25.21 KG/M2 | RESPIRATION RATE: 16 BRPM | OXYGEN SATURATION: 97 % | TEMPERATURE: 97.3 F | DIASTOLIC BLOOD PRESSURE: 78 MMHG | SYSTOLIC BLOOD PRESSURE: 120 MMHG | HEART RATE: 80 BPM

## 2022-01-04 DIAGNOSIS — Z17.1 MALIGNANT NEOPLASM OF UPPER-INNER QUADRANT OF RIGHT BREAST IN FEMALE, ESTROGEN RECEPTOR NEGATIVE (H): ICD-10-CM

## 2022-01-04 DIAGNOSIS — I25.83 CORONARY ARTERY DISEASE DUE TO LIPID RICH PLAQUE: ICD-10-CM

## 2022-01-04 DIAGNOSIS — F41.9 ANXIETY: ICD-10-CM

## 2022-01-04 DIAGNOSIS — C50.211 MALIGNANT NEOPLASM OF UPPER-INNER QUADRANT OF RIGHT BREAST IN FEMALE, ESTROGEN RECEPTOR NEGATIVE (H): ICD-10-CM

## 2022-01-04 DIAGNOSIS — I25.10 CORONARY ARTERY DISEASE DUE TO LIPID RICH PLAQUE: ICD-10-CM

## 2022-01-04 DIAGNOSIS — K63.5 POLYP OF COLON, UNSPECIFIED PART OF COLON, UNSPECIFIED TYPE: ICD-10-CM

## 2022-01-04 DIAGNOSIS — F33.41 RECURRENT MAJOR DEPRESSIVE DISORDER, IN PARTIAL REMISSION (H): Chronic | ICD-10-CM

## 2022-01-04 DIAGNOSIS — G47.00 INSOMNIA, UNSPECIFIED TYPE: Chronic | ICD-10-CM

## 2022-01-04 DIAGNOSIS — I25.10 ASCVD (ARTERIOSCLEROTIC CARDIOVASCULAR DISEASE): Chronic | ICD-10-CM

## 2022-01-04 DIAGNOSIS — R79.89 ELEVATED TSH: ICD-10-CM

## 2022-01-04 DIAGNOSIS — E78.5 HYPERLIPIDEMIA LDL GOAL <100: Chronic | ICD-10-CM

## 2022-01-04 DIAGNOSIS — Z00.00 ENCOUNTER FOR ANNUAL PHYSICAL EXAM: Primary | ICD-10-CM

## 2022-01-04 LAB
ALBUMIN SERPL-MCNC: 3.7 G/DL (ref 3.4–5)
ALP SERPL-CCNC: 63 U/L (ref 40–150)
ALT SERPL W P-5'-P-CCNC: 26 U/L (ref 0–50)
ANION GAP SERPL CALCULATED.3IONS-SCNC: 5 MMOL/L (ref 3–14)
AST SERPL W P-5'-P-CCNC: 20 U/L (ref 0–45)
BILIRUB SERPL-MCNC: 0.6 MG/DL (ref 0.2–1.3)
BUN SERPL-MCNC: 9 MG/DL (ref 7–30)
CALCIUM SERPL-MCNC: 8.9 MG/DL (ref 8.5–10.1)
CHLORIDE BLD-SCNC: 106 MMOL/L (ref 94–109)
CHOLEST SERPL-MCNC: 149 MG/DL
CO2 SERPL-SCNC: 28 MMOL/L (ref 20–32)
CREAT SERPL-MCNC: 0.86 MG/DL (ref 0.52–1.04)
ERYTHROCYTE [DISTWIDTH] IN BLOOD BY AUTOMATED COUNT: 13 % (ref 10–15)
FASTING STATUS PATIENT QL REPORTED: YES
GFR SERPL CREATININE-BSD FRML MDRD: 77 ML/MIN/1.73M2
GLUCOSE BLD-MCNC: 99 MG/DL (ref 70–99)
HCT VFR BLD AUTO: 39.5 % (ref 35–47)
HDLC SERPL-MCNC: 54 MG/DL
HGB BLD-MCNC: 12.7 G/DL (ref 11.7–15.7)
LDLC SERPL CALC-MCNC: 68 MG/DL
MCH RBC QN AUTO: 29.2 PG (ref 26.5–33)
MCHC RBC AUTO-ENTMCNC: 32.2 G/DL (ref 31.5–36.5)
MCV RBC AUTO: 91 FL (ref 78–100)
NONHDLC SERPL-MCNC: 95 MG/DL
PLATELET # BLD AUTO: 285 10E3/UL (ref 150–450)
POTASSIUM BLD-SCNC: 4.2 MMOL/L (ref 3.4–5.3)
PROT SERPL-MCNC: 7 G/DL (ref 6.8–8.8)
RBC # BLD AUTO: 4.35 10E6/UL (ref 3.8–5.2)
SODIUM SERPL-SCNC: 139 MMOL/L (ref 133–144)
T4 FREE SERPL-MCNC: 1.08 NG/DL (ref 0.76–1.46)
TRIGL SERPL-MCNC: 136 MG/DL
TSH SERPL DL<=0.005 MIU/L-ACNC: 6.8 MU/L (ref 0.4–4)
WBC # BLD AUTO: 4.7 10E3/UL (ref 4–11)

## 2022-01-04 PROCEDURE — 85027 COMPLETE CBC AUTOMATED: CPT | Performed by: INTERNAL MEDICINE

## 2022-01-04 PROCEDURE — 80061 LIPID PANEL: CPT | Performed by: INTERNAL MEDICINE

## 2022-01-04 PROCEDURE — 80053 COMPREHEN METABOLIC PANEL: CPT | Performed by: INTERNAL MEDICINE

## 2022-01-04 PROCEDURE — 84439 ASSAY OF FREE THYROXINE: CPT | Performed by: INTERNAL MEDICINE

## 2022-01-04 PROCEDURE — 84443 ASSAY THYROID STIM HORMONE: CPT | Performed by: INTERNAL MEDICINE

## 2022-01-04 PROCEDURE — 36415 COLL VENOUS BLD VENIPUNCTURE: CPT | Performed by: INTERNAL MEDICINE

## 2022-01-04 PROCEDURE — 99396 PREV VISIT EST AGE 40-64: CPT | Performed by: INTERNAL MEDICINE

## 2022-01-04 RX ORDER — ALPRAZOLAM 1 MG
TABLET ORAL
Qty: 60 TABLET | Refills: 0 | Status: SHIPPED | OUTPATIENT
Start: 2022-01-04 | End: 2022-03-02

## 2022-01-04 RX ORDER — ALPRAZOLAM 1 MG
TABLET ORAL
Qty: 30 TABLET | OUTPATIENT
Start: 2022-01-04

## 2022-01-04 RX ORDER — ROSUVASTATIN CALCIUM 20 MG/1
20 TABLET, COATED ORAL DAILY
Qty: 90 TABLET | Refills: 3 | Status: SHIPPED | OUTPATIENT
Start: 2022-01-04 | End: 2023-01-27

## 2022-01-04 ASSESSMENT — PAIN SCALES - GENERAL: PAINLEVEL: NO PAIN (0)

## 2022-01-04 ASSESSMENT — MIFFLIN-ST. JEOR: SCORE: 1403.46

## 2022-01-04 NOTE — RESULT ENCOUNTER NOTE
It was a pleasure seeing you for your physical examination.  I wanted to get back to you with your test results.  I have enclosed a copy for your review.     I am happy to report that your cbc or complete blood count is normal with no signs of anemia, leukemia or platelet abnormalities. Your chemistry panel shows no signs of diabetes.  Your blood salts, kidney tests, liver tests, and proteins are all fine.    Your total cholesterol is 149 with the normal range being below 200.  Your HDL or good cholesterol is 54 with the normal range being above 50.  Your LDL or bad cholesterol is 68 with the normal range being below 130.  These numbers are very good.    One additional test is your thyroid.  The TSH is just slightly off but the free T4 is normal.  The bottom line is that there is nothing to do different but I would like to check it in about 6 months.  Sometimes as we age the thyroid test goes just a little bit off but yours is not at all concerningIf you have any questions let me know.    Tera Keith M.D.

## 2022-01-04 NOTE — PROGRESS NOTES
SUBJECTIVE:   CC: Madison Garcia is an 61 year old woman who presents for preventive health visit.     She is doing well overall.  She was working out a lot with the winter.  She is up-to-date with her surgeon.  She needs to see her gynecologist and will do so.  Her recent anxiety and depression are under good control.  She takes Xanax nightly with no signs of abuse.  I did review the PDMP.  She otherwise feels fine.    She did have an episode of chest pain in November with a negative CT.  Since then she has had none.      Patient has been advised of split billing requirements and indicates understanding: Yes  Healthy Habits:     Getting at least 3 servings of Calcium per day:  Yes    Bi-annual eye exam:  Yes    Dental care twice a year:  Yes    Sleep apnea or symptoms of sleep apnea:  None    Diet:  Vegetarian/vegan    Frequency of exercise:  None    Taking medications regularly:  Yes    Medication side effects:  None    PHQ-2 Total Score: 0    Additional concerns today:  Yes              Today's PHQ-2 Score:   PHQ-2 ( 1999 [employer]) 1/2/2022   Q1: Little interest or pleasure in doing things 0   Q2: Feeling down, depressed or hopeless 0   PHQ-2 Score 0   PHQ-2 Total Score (12-17 Years)- Positive if 3 or more points; Administer PHQ-A if positive -   Q1: Little interest or pleasure in doing things Not at all   Q2: Feeling down, depressed or hopeless Not at all   PHQ-2 Score 0       Abuse: Current or Past (Physical, Sexual or Emotional) - No  Do you feel safe in your environment? Yes        Social History     Tobacco Use     Smoking status: Never Smoker     Smokeless tobacco: Never Used   Substance Use Topics     Alcohol use: Yes     Alcohol/week: 0.0 standard drinks     Comment: 1-2/week                 Past Medical History:      Past Medical History:   Diagnosis Date     Abdominal pain 2008 and 2010    ct neg 2008, ovar us 2010 with fallopian cyst and fibroids     Anxiety and depression 90's    on xanax hs for  years     ASCVD (arteriosclerotic cardiovascular disease) 12/2012    pos est, ct angio and cards fu, 25-49% lad     Cancer (H) 2020     Chest tightness 12/2019    neg est echo     Chronic cystitis     Dr. Mcgrath     Chronic insomnia     for years, seen in sleep clinic 2016     Colonic polyp fu nl 2008    fu due 2013, fu done 2014 and nl, to fu 2019, fu nl 12/20     Depressive disorder      [place] (dyspnea on exertion)     est echo nl 2015, ct chest and pulm eval by Dr. Quiroga and no cause found     Ductal carcinoma in situ (DCIS) of right breast 2020    lumpectomy and xrt     Elevated TSH 11/2017     Hyperlipidaemia      Leg pain 2014    stopped lipitor     Lung nodules 01/2012    seen on coronary calcium ct, fu 1 year, fu done 12/12 and likely benign, fu done 2016 and no change, Dr. Quiroga     MVP (mitral valve prolapse) 2013    Had it surgically repaired, Robotic, done [place] 6/13 seen by cardiology 2011, echo 2007 with mild mr and nl lv fxn, fu echo 12/11 with lv size upper limits of nl, nl ef, no wma, mild lae, mvp and mild mr present     Palpitations      Screening 01/2012    cor ct score 0             Past Surgical History:      Past Surgical History:   Procedure Laterality Date     BIOPSY BREAST       COLONOSCOPY  12/20     COLONOSCOPY WITH CO2 INSUFFLATION N/A 12/30/2020    Procedure: COLONOSCOPY, WITH CO2 INSUFFLATION;  Surgeon: Tera Luu MD;  Location: [facility] OR     HERNIA REPAIR       LUMPECTOMY BREAST       LUMPECTOMY BREAST WITH SEED LOCALIZATION Right 7/8/2020    Procedure: SEED LOCALIZED RIGHT BREAST LUMPECTOMY;  Surgeon: Elise Slater MD;  Location: [facility] OR     REPAIR VALVE MITRAL  6/13    done at [place], robotic             Social History:     Social History     Socioeconomic History     Marital status:      Spouse name: Not on file     Number of children: 2     Years of education: Not on file     Highest education level: Not on file   Occupational History     Occupation:       Employer: [employer]     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   Tobacco Use     Smoking status: Never Smoker     Smokeless tobacco: Never Used   Substance and Sexual Activity     Alcohol use: Yes     Alcohol/week: 0.0 standard drinks     Comment: 1-2/week      Drug use: No     Sexual activity: Yes     Partners: Male     Birth control/protection: Condom   Other Topics Concern      Service Not Asked     Blood Transfusions Not Asked     Caffeine Concern Yes     Comment: 2 cans of pop per day     Occupational Exposure Not Asked     Hobby Hazards Not Asked     Sleep Concern Not Asked     Stress Concern Not Asked     Weight Concern Not Asked     Special Diet Yes     Comment: vegetarian     Back Care Not Asked     Exercise Yes     Comment: 3x per week      Bike Helmet Not Asked     Seat Belt Not Asked     Self-Exams Not Asked     Parent/sibling w/ CABG, MI or angioplasty before 65F 55M? No   Social History Narrative     Not on file     Social Determinants of Health     Financial Resource Strain: Not on file   Food Insecurity: Not on file   Transportation Needs: Not on file   Physical Activity: Not on file   Stress: Not on file   Social Connections: Not on file   Intimate Partner Violence: Not on file   Housing Stability: Not on file             Family History:   reviewed         Allergies:     Allergies   Allergen Reactions     Albumin (Human) Difficulty breathing     Atorvastatin Muscle Pain (Myalgia)     Erythromycin      GI upset, intolerance             Medications:     Current Outpatient Medications   Medication Sig Dispense Refill     ALPRAZolam (XANAX) 1 MG tablet TAKE 1 TABLET BY MOUTH EVERY DAY AT BEDTIME AS NEEDED FOR ANXIETY 60 tablet 0     amoxicillin (AMOXIL) 500 MG tablet Take 2,000 mg by mouth daily as needed (prior to dental appointments)       aspirin 81 MG tablet Take 1 tablet by mouth daily. 1 tablet 3     buPROPion (WELLBUTRIN XL) 150 MG 24 hr tablet Take 1 of the 150mg along with one  "of the 300mg tablets for total daily dose of 450mg 90 tablet 3     buPROPion (WELLBUTRIN XL) 300 MG 24 hr tablet TAKE 1 TABLET BY MOUTH EVERY DAY ALONG WITH THE 150MG TABLET 90 tablet 3     diphenhydrAMINE-APAP, sleep, (TYLENOL PM EXTRA STRENGTH PO) Take 2 tablets by mouth every evening        ibuprofen (ADVIL) 200 MG tablet Take 400 mg by mouth every 4 hours as needed       Ibuprofen-Diphenhydramine Cit (IBUPROFEN PM PO) Take 2 tablets by mouth every evening        melatonin 5 MG CAPS Take 6-7.5 mg by mouth At Bedtime        Multiple Vitamin (DAILY MULTIVITAMIN PO) Take 1 tablet by mouth daily.       rosuvastatin (CRESTOR) 20 MG tablet Take 1 tablet (20 mg) by mouth daily 90 tablet 3               Review of Systems:   The 10 point Review of Systems is negative other than noted in the HPI           Physical Exam:   Blood pressure 120/78, pulse 80, temperature 97.3  F (36.3  C), temperature source Tympanic, resp. rate 16, height 1.74 m (5' 8.5\"), weight 77.2 kg (170 lb 3.2 oz), SpO2 97 %, not currently breastfeeding.    Exam:  Constitutional: healthy appearing, alert and in no distress  Heent: Normocephalic. Head without obvious masses or lesions. PERRLDC, EOMI. Mouth exam within normal limits: tongue, mucous membranes, posterior pharynx all normal, no lesions or abnormalities seen.  Tm's and canals within normal limits bilaterally. Neck supple, no nuchal rigidity or masses. No supraclavicular, or cervical adenopathy. Thyroid symmetric, no masses.  Cardiovascular: Regular rate and rhythm, no murmer, rub or gallops.  JVP not elevated, no edema.  Carotids within normal limits bilaterally, no bruits.  Respiratory: Normal respiratory effort.  Lungs clear, normal flow, no wheezing or crackles.  Gastrointestinal: Normal active bowel sounds.   Soft, not tender, no masses, guarding or rebound.  No hepatosplenomegaly.   Musculoskeletal: extremities normal, no gross deformities noted.  Skin: no suspicious lesions or rashes "   Neurologic: Mental status within normal limits.  Speech fluent.  No gross motor abnormalities and gait intact.  Psychiatric: mentation appears normal and affect normal.         Data:   Labs sent        Assessment:   1. Normal complete physical exam  2. Chest pain, doubt cv, pulmonary embolis, tumor, if more to call  3. Depression and anxiety, doing well, no signs xanax abuse  4. Dcis, nick  5. Elevated cholesterol, follow up labs  6. Cad, med mgmt  7. Elevated tsh, follow up labs  8. Colon polyp, up to date on follow up  9. hcm         Plan:   shingrix at pharm  Gyn for pap  Exercise, diet  Letter with labs      Tera Keith M.D.

## 2022-01-04 NOTE — LETTER
January 4, 2022      Madison Garcia  [address] [address] [place] [id number]        Dear ,    We are writing to inform you of your test results.    It was a pleasure seeing you for your physical examination.  I wanted to get back to you with your test results.  I have enclosed a copy for your review.     I am happy to report that your cbc or complete blood count is normal with no signs of anemia, leukemia or platelet abnormalities. Your chemistry panel shows no signs of diabetes.  Your blood salts, kidney tests, liver tests, and proteins are all fine.     Your total cholesterol is 149 with the normal range being below 200.  Your HDL or good cholesterol is 54 with the normal range being above 50.  Your LDL or bad cholesterol is 68 with the normal range being below 130.  These numbers are very good.     One additional test is your thyroid.  The TSH is just slightly off but the free T4 is normal.  The bottom line is that there is nothing to do different but I would like to check it in about 6 months.  Sometimes as we age the thyroid test goes just a little bit off but yours is not at all concerning    If you have any questions let me know.       Resulted Orders   CBC with platelets   Result Value Ref Range    WBC Count 4.7 4.0 - 11.0 10e3/uL    RBC Count 4.35 3.80 - 5.20 10e6/uL    Hemoglobin 12.7 11.7 - 15.7 g/dL    Hematocrit 39.5 35.0 - 47.0 %    MCV 91 78 - 100 fL    MCH 29.2 26.5 - 33.0 pg    MCHC 32.2 31.5 - 36.5 g/dL    RDW 13.0 10.0 - 15.0 %    Platelet Count 285 150 - 450 10e3/uL   Comprehensive metabolic panel   Result Value Ref Range    Sodium 139 133 - 144 mmol/L    Potassium 4.2 3.4 - 5.3 mmol/L    Chloride 106 94 - 109 mmol/L    Carbon Dioxide (CO2) 28 20 - 32 mmol/L    Anion Gap 5 3 - 14 mmol/L    Urea Nitrogen 9 7 - 30 mg/dL    Creatinine 0.86 0.52 - 1.04 mg/dL    Calcium 8.9 8.5 - 10.1 mg/dL    Glucose 99 70 - 99 mg/dL    Alkaline Phosphatase 63 40 - 150 U/L    AST 20 0 - 45 U/L    ALT  26 0 - 50 U/L    Protein Total 7.0 6.8 - 8.8 g/dL    Albumin 3.7 3.4 - 5.0 g/dL    Bilirubin Total 0.6 0.2 - 1.3 mg/dL    GFR Estimate 77 >60 mL/min/1.73m2      Comment:      Effective December 21, 2021 eGFRcr in adults is calculated using the 2021 CKD-EPI creatinine equation which includes age and gender (Papo et al., [place], DOI: 10.1056/RJLEdn1225721)   Lipid panel reflex to direct LDL Fasting   Result Value Ref Range    Cholesterol 149 <200 mg/dL    Triglycerides 136 <150 mg/dL    Direct Measure HDL 54 >=50 mg/dL    LDL Cholesterol Calculated 68 <=100 mg/dL    Non HDL Cholesterol 95 <130 mg/dL    Patient Fasting > 8hrs? Yes     Narrative    Cholesterol  Desirable:  <200 mg/dL    Triglycerides  Normal:  Less than 150 mg/dL  Borderline High:  150-199 mg/dL  High:  200-499 mg/dL  Very High:  Greater than or equal to 500 mg/dL    Direct Measure HDL  Female:  Greater than or equal to 50 mg/dL   Male:  Greater than or equal to 40 mg/dL    LDL Cholesterol  Desirable:  <100mg/dL  Above Desirable:  100-129 mg/dL   Borderline High:  130-159 mg/dL   High:  160-189 mg/dL   Very High:  >= 190 mg/dL    Non HDL Cholesterol  Desirable:  130 mg/dL  Above Desirable:  130-159 mg/dL  Borderline High:  160-189 mg/dL  High:  190-219 mg/dL  Very High:  Greater than or equal to 220 mg/dL   TSH with free T4 reflex   Result Value Ref Range    TSH 6.80 (H) 0.40 - 4.00 mU/L   T4 free   Result Value Ref Range    Free T4 1.08 0.76 - 1.46 ng/dL       If you have any questions or concerns, please call the clinic at the number listed above.       Sincerely,      Tera Keith MD

## 2022-01-27 ENCOUNTER — HOSPITAL ENCOUNTER (OUTPATIENT)
Dept: OCCUPATIONAL THERAPY | Facility: CLINIC | Age: 62
Setting detail: THERAPIES SERIES
End: 2022-01-27
Attending: SURGERY
Payer: COMMERCIAL

## 2022-01-27 DIAGNOSIS — L90.5 AXILLARY WEB SYNDROME: Primary | ICD-10-CM

## 2022-01-27 DIAGNOSIS — M79.621 AXILLARY PAIN, RIGHT: ICD-10-CM

## 2022-01-27 DIAGNOSIS — L76.82 AXILLARY WEB SYNDROME: Primary | ICD-10-CM

## 2022-01-27 PROCEDURE — 97140 MANUAL THERAPY 1/> REGIONS: CPT | Mod: GO

## 2022-01-27 PROCEDURE — 97165 OT EVAL LOW COMPLEX 30 MIN: CPT | Mod: GO

## 2022-01-27 NOTE — PROGRESS NOTES
01/27/22 1200   Rehab Discipline   Discipline OT   Type of Visit   Type of visit Initial Edema Evaluation   General Information   Start of care 01/27/22   Referring physician Elise Slater   Orders Evaluate and treat as indicated   Order date 12/21/21   Medical diagnosis axillary pain; axillary cording   Onset of illness / date of surgery 12/21/21   Edema onset 12/21/21  (no edema/lymphedema)   Affected body parts RUE;Trunk   Edema etiology Radiation;Surgery   Location - Radiation R chest wall [id number]   Edema etiology comments Pt has history lumpectomy 2020 on her R side, no LN removal, and radiation to R chest wall back in [id number] when she was receiving care. Pt does not display s/s lymphedema rather some STARR or cording R axilla and trunk. This is hard to determine if true cording or more scar tissue formation and restrictions from past thoracotomy procedures for cardiac valve repair and chest tube placement. regardless of STARR or scar tissue formation from past surgery, tissue will benefit from remodeling to soften and loosen adherence for better AROM and less discomfort with activity.   Pertinent history of current problem (PT: include personal factors and/or comorbidities that impact the POC; OT: include additional occupational profile info) PMH significant for arthritis, breast cancer R sided treated with lumpectomy and radiation [id number], hypothyroid issues reported, hernia reported, and heart valve repair 2013.   Surgical / medical history reviewed Yes   Prior level of functional mobility I   Prior treatment   (none)   Community support Family / friend caregiver   Patient role / employment history Employed  (computer job; works from home)   Living environment [place] [facility]   Fall Risk Screen   Fall screen completed by OT   Have you fallen 2 or more times in the past year? No   Have you fallen and had an injury in the past year? No   Is patient a fall risk? No   Abuse Screen (yes response referral  indicated)   Feels Unsafe at Home or Work/School no   Feels Threatened by Someone no   Does Anyone Try to Keep You From Having Contact with Others or Doing Things Outside Your Home? no   Physical Signs of Abuse Present no   System Outcome Measures   Lymphedema Life Impact Scale (score range 0-72). A higher score indicates greater impairment. 2   Subjective Report   Patient report of symptoms discomfort in R axilla; tension R axilla and R trunk   Patient / Family Goals   Patient / family goals statement to reduce discomfort and help AROM RUE   Pain   Pain comments Pt reports had pain back when referral placed but now not pain rather discomfort R trunk vs R axilla   Cognitive Status   Orientation Orientation to person, place and time   Level of consciousness Alert   Follows commands and answers questions 100% of the time   Personal safety and judgement Intact   Edema Exam / Assessment   Skin condition Non-pitting;Intact   Skin condition comments no lymphedema identified; possible mild lymph R axilla   Scar   (R sided lumpectomy near midline; thoracotomy scar R chest)   Capillary refill Symmetrical   Stemmer sign Negative   Girth Measurements   Girth Measurements   (will obtain upon return for services)   Range of Motion   ROM comments slight AROM deficit RUE; supriya flex  degrees; supriya flex L side 145 degrees   Strength   Strength comments NT'd; 3/5 displayed   Activities of Daily Living   Activities of Daily Living I   Bed Mobility   Bed mobility I   Transfers   Transfers I   Gait / Locomotion   Gait / Locomotion I   Sensory   Sensory perception comments no neuropathy reported or identified   Vascular Assessment   Vascular Assessment Comments no issues noted or reported   Coordination   Coordination Gross motor coordination appropriate   Muscle Tone   Muscle tone No deficits were identified   Planned Edema Interventions   Planned edema interventions Manual lymph drainage;Fit for compression  garment;Exercises;Precautions to prevent infection / exacerbation;Education;Manual therapy;Skin care / precautions;Scar mobilization;Myofascial release;Home management program development   Clinical Impression   Criteria for skilled therapeutic intervention met Yes   Therapy diagnosis STARR RUE; AROM deficits RUE   Influenced by the following impairments / conditions Axillary Web Syndrome   Assessment of Occupational Performance 1-3 Performance Deficits   Identified Performance Deficits discomfort with RUE use; compromised reaching 2/2 discomfort in RUE/trunk   Clinical Decision Making (Complexity) Low complexity   Treatment Frequency 2x/week   Treatment duration 2x/wk x 8 weeks   Patient / family and/or staff in agreement with plan of care Yes   Risks and benefits of therapy have been explained Yes   Clinical impression comments Pt can benefit from skilled lymphedema services to help remodel R sided scar tissue from lumpectmy, thoracotomy for cardiac valve repair, and radiated tissue to loosen and free STARR RUE/trunk for releif in discomfort needed for better RUE reaching and functional activity engagement   Goals   Edema Eval Goals 1;2;3   Goal 1   Goal identifier MLD/MFR/HEP   Goal description Demonstrate independence in performing prescribed exercises to facilate the lymph system, muslce pumping systems, and self MLD/MFR for home management to remodel scar tissue R chest wall for releif in discomfort for improved reaching and functional use RUE   Target date 04/22/22   Goal 2   Goal identifier Garments   Goal description Be independent in donning/doffing, wearing schedule, and care of compression garments for I building with home management R sided chest wall scar tissue and STARR for reduction in discomfort needed to promote optimal AROM RUE use and lessen discomfort for reaching an dfunctional use RUE with daily activity   Target date 04/22/22   Goal 3   Goal identifier Discomfort   Goal description Pt will report  a reduction in the discomfort she has R trunk/R axilla for pain releif needed to improve reaching task engagement and overal funtional use RUE with daily activity   Target date 04/22/22   Total Evaluation Time   OT Maria D, Low Complexity Minutes (44868) 15

## 2022-01-31 RX ORDER — ROSUVASTATIN CALCIUM 20 MG/1
TABLET, COATED ORAL
Qty: 90 TABLET | Refills: 3 | OUTPATIENT
Start: 2022-01-31

## 2022-01-31 NOTE — TELEPHONE ENCOUNTER
This is a duplicate medication request. Mediation script last sent on last ordered 1/4/22.     Johny Gold RN  [facility]

## 2022-02-23 ENCOUNTER — VIRTUAL VISIT (OUTPATIENT)
Dept: FAMILY MEDICINE | Facility: CLINIC | Age: 62
End: 2022-02-23
Payer: COMMERCIAL

## 2022-02-23 DIAGNOSIS — R10.13 DYSPEPSIA: Primary | ICD-10-CM

## 2022-02-23 PROCEDURE — 99213 OFFICE O/P EST LOW 20 MIN: CPT | Mod: GT | Performed by: PHYSICIAN ASSISTANT

## 2022-02-23 ASSESSMENT — ANXIETY QUESTIONNAIRES
2. NOT BEING ABLE TO STOP OR CONTROL WORRYING: NOT AT ALL
IF YOU CHECKED OFF ANY PROBLEMS ON THIS QUESTIONNAIRE, HOW DIFFICULT HAVE THESE PROBLEMS MADE IT FOR YOU TO DO YOUR WORK, TAKE CARE OF THINGS AT HOME, OR GET ALONG WITH OTHER PEOPLE: SOMEWHAT DIFFICULT
7. FEELING AFRAID AS IF SOMETHING AWFUL MIGHT HAPPEN: NOT AT ALL
5. BEING SO RESTLESS THAT IT IS HARD TO SIT STILL: NOT AT ALL
6. BECOMING EASILY ANNOYED OR IRRITABLE: NOT AT ALL
3. WORRYING TOO MUCH ABOUT DIFFERENT THINGS: NOT AT ALL
1. FEELING NERVOUS, ANXIOUS, OR ON EDGE: MORE THAN HALF THE DAYS
GAD7 TOTAL SCORE: 2

## 2022-02-23 ASSESSMENT — PATIENT HEALTH QUESTIONNAIRE - PHQ9
SUM OF ALL RESPONSES TO PHQ QUESTIONS 1-9: 7
5. POOR APPETITE OR OVEREATING: NOT AT ALL

## 2022-02-23 NOTE — PATIENT INSTRUCTIONS
Antacids help neutralize the normal acids in your stomach.    Acid blockers (H2 blockers) decrease acid production. Examples are cimetidine and famotidine.

## 2022-02-23 NOTE — PROGRESS NOTES
Madison is a 62 year old who is being evaluated via a billable video visit.      How would you like to obtain your AVS? MyChart  If the video visit is dropped, the invitation should be resent by: Text to cell phone: [phone]  Will anyone else be joining your video visit? No      Video Start Time: 1:08 PM    Assessment & Plan     Dyspepsia  Discussed using TUMs and or Pepcid for this.  [place] diet, push fluids.  She will consider getting a PCR covid test if not improving or other symptoms develop.  Come in to be seen or go to UC if worsening.        Return in about 3 days (around 2/26/2022) for if symptoms worsen or fail to improve.    Darius Church PA-C  [employer]    Alejandra Wallace is a 62 year old who presents for the following health issues     History of Present Illness     Reason for visit:  Fever, body aches, intestinal issues, heartburn. Covid antigen test came back negative.  Symptom onset:  1-3 days ago  Symptoms include:  Fever, body aches, intestinal issues, heartburn.  Symptom intensity:  Moderate  Symptom progression:  Staying the same  Had these symptoms before:  No  What makes it worse:  Lying down. Heart burn/acid reflux worse?    She eats 2-3 servings of fruits and vegetables daily.She consumes 2 sweetened beverage(s) daily.She exercises with enough effort to increase her heart rate 9 or less minutes per day.  She exercises with enough effort to increase her heart rate 3 or less days per week.   She is taking medications regularly.     PHQ 12/2/2019 12/10/2020 2/23/2022   PHQ-9 Total Score 2 0 7   Q9: Thoughts of better off dead/self-harm past 2 weeks Not at all Not at all Not at all     ANUPAM-7 SCORE 2/23/2022   Total Score 2     Symptoms started with nausea and diarrhea.  Also having indigestion and reflux, isa when laying down.  Kids had covid in the middle of Jan- she was around them. She has done vaccines and booster.  Did rapid covid test that was negative.  No  "one else is ill.  Appetite is down- not eating much at all  No medication changes or new medications      Review of Systems   Constitutional, HEENT, cardiovascular, pulmonary, gi and gu systems are negative, except as otherwise noted.      Objective    Vitals - Patient Reported  Weight (Patient Reported): 77.1 kg (170 lb) (LAST OV APPT)  Height (Patient Reported): 174 cm (5' 8.5\")  BMI (Based on Pt Reported Ht/Wt): 25.47  Temperature (Patient Reported): 100.5  F (38.1  C) (TYMPANIC)  Pain Score: Severe Pain (6)  Pain Loc:  (ALL OVER PAIN)      Vitals:  No vitals were obtained today due to virtual visit.    Physical Exam   GENERAL: Healthy, alert and no distress  EYES: Eyes grossly normal to inspection.  No discharge or erythema, or obvious scleral/conjunctival abnormalities.  RESP: No audible wheeze, cough, or visible cyanosis.  No visible retractions or increased work of breathing.    SKIN: Visible skin clear. No significant rash, abnormal pigmentation or lesions.  NEURO: Cranial nerves grossly intact.  Mentation and speech appropriate for age.  PSYCH: Mentation appears normal, affect normal/bright, judgement and insight intact, normal speech and appearance well-groomed.            Video-Visit Details    Type of service:  Video Visit    Video End Time:1:24 PM    Originating Location (pt. Location): Home    Distant Location (provider location):  [facility]     Platform used for Video Visit: Sahra"

## 2022-02-24 ASSESSMENT — ANXIETY QUESTIONNAIRES: GAD7 TOTAL SCORE: 2

## 2022-02-28 DIAGNOSIS — F41.9 ANXIETY: ICD-10-CM

## 2022-03-02 RX ORDER — ALPRAZOLAM 1 MG
TABLET ORAL
Qty: 60 TABLET | Refills: 0 | Status: SHIPPED | OUTPATIENT
Start: 2022-03-02 | End: 2022-04-28

## 2022-03-02 NOTE — TELEPHONE ENCOUNTER
Routing refill request to provider for review/approval because:  Drug not on the [facility] refill protocol     Pending Prescriptions:                       Disp   Refills    ALPRAZolam (XANAX) 1 MG tablet [Pharmacy *60 tab*             Sig: TAKE 1 TABLET BY MOUTH EVERY DAY AT BEDTIME AS           NEEDED FOR ANXIETY    Last Written Prescription Date:  1/4/2022  Last Fill Quantity: 60,  # refills: 0   Last office visit: 1/4/2022 with prescribing provider:  Tera Keith   Future Office Visit:  none    Johny Gold RN  [facility] Triage Nurse

## 2022-04-27 DIAGNOSIS — F41.9 ANXIETY: ICD-10-CM

## 2022-04-28 RX ORDER — ALPRAZOLAM 1 MG
TABLET ORAL
Qty: 60 TABLET | Refills: 0 | Status: SHIPPED | OUTPATIENT
Start: 2022-04-28 | End: 2022-06-27

## 2022-04-28 NOTE — TELEPHONE ENCOUNTER
Routing refill request to provider for review/approval because:  Drug not on the [facility] refill protocol   Kirsten Gilmore RN

## 2022-06-27 DIAGNOSIS — F41.9 ANXIETY: ICD-10-CM

## 2022-06-27 RX ORDER — ALPRAZOLAM 1 MG
TABLET ORAL
Qty: 60 TABLET | Refills: 0 | Status: SHIPPED | OUTPATIENT
Start: 2022-06-27 | End: 2022-08-26

## 2022-06-27 NOTE — TELEPHONE ENCOUNTER
Routing refill request to provider for review/approval because:  Drug not on the [facility] refill protocol   Татьяна CHAPPELL RN  [facility]

## 2022-07-25 ENCOUNTER — ANCILLARY PROCEDURE (OUTPATIENT)
Dept: MAMMOGRAPHY | Facility: CLINIC | Age: 62
End: 2022-07-25
Attending: SURGERY
Payer: COMMERCIAL

## 2022-07-25 DIAGNOSIS — R92.1 CALCIFICATION OF BOTH BREASTS ON MAMMOGRAPHY: ICD-10-CM

## 2022-07-25 PROCEDURE — 77066 DX MAMMO INCL CAD BI: CPT | Performed by: RADIOLOGY

## 2022-07-25 PROCEDURE — G0279 TOMOSYNTHESIS, MAMMO: HCPCS | Performed by: RADIOLOGY

## 2022-08-11 ENCOUNTER — LAB (OUTPATIENT)
Dept: LAB | Facility: CLINIC | Age: 62
End: 2022-08-11
Payer: COMMERCIAL

## 2022-08-11 DIAGNOSIS — R79.89 ELEVATED TSH: ICD-10-CM

## 2022-08-11 PROCEDURE — 84443 ASSAY THYROID STIM HORMONE: CPT

## 2022-08-11 PROCEDURE — 36415 COLL VENOUS BLD VENIPUNCTURE: CPT

## 2022-08-11 PROCEDURE — 84439 ASSAY OF FREE THYROXINE: CPT

## 2022-08-12 LAB
T4 FREE SERPL-MCNC: 0.93 NG/DL (ref 0.76–1.46)
TSH SERPL DL<=0.005 MIU/L-ACNC: 4.85 MU/L (ref 0.4–4)

## 2022-08-12 NOTE — RESULT ENCOUNTER NOTE
Your thyroid test has improved.  The TSH is closer to normal and the free T4 remains normal.  Nothing needs to be done about it at this point.    Let me know if you have questions.    Tera Keith M.D.

## 2022-08-26 DIAGNOSIS — F41.9 ANXIETY: ICD-10-CM

## 2022-08-26 RX ORDER — ALPRAZOLAM 1 MG
TABLET ORAL
Qty: 60 TABLET | Refills: 0 | Status: SHIPPED | OUTPATIENT
Start: 2022-08-26 | End: 2022-10-26

## 2022-08-29 ENCOUNTER — MEDICAL CORRESPONDENCE (OUTPATIENT)
Dept: HEALTH INFORMATION MANAGEMENT | Facility: CLINIC | Age: 62
End: 2022-08-29

## 2022-08-29 ENCOUNTER — TRANSFERRED RECORDS (OUTPATIENT)
Dept: HEALTH INFORMATION MANAGEMENT | Facility: CLINIC | Age: 62
End: 2022-08-29

## 2022-08-30 ENCOUNTER — TRANSCRIBE ORDERS (OUTPATIENT)
Dept: OTHER | Age: 62
End: 2022-08-30

## 2022-08-30 ENCOUNTER — PRE VISIT (OUTPATIENT)
Dept: ONCOLOGY | Facility: CLINIC | Age: 62
End: 2022-08-30

## 2022-08-30 DIAGNOSIS — N95.2 ATROPHIC VAGINITIS: Primary | ICD-10-CM

## 2022-08-30 NOTE — PROGRESS NOTES
Action    Action Taken 8/30/2022 3:33pm KEB   I faxed records from [facility] to [facility]     I called pt Madison - unavailable.     8/31/2022 11:11AM KEB   I faxed the [facility] Records to [facility] a second time.     8/31/2022 12:02pm KEB   I called pt Madison -     9/7/2022 3:23pm KEB   [facility] Recs still aren't in [facility]- I escalated the issue to Antonia    9/8/2022 9:24AM KEB   I called [facility] to see if they have any imaging on pt Madison     I spoke to a nurse at [facility]- she was in on August 29th 2022.  They don't have any images on pt Madison. The nurse will fax over the office note.     I called Madison - all records have been accounted for in Epic- no imaging to collect.      RECORDS STATUS - ALL OTHER DIAGNOSIS      RECORDS RECEIVED FROM: [facility]/ [facility]    DATE RECEIVED:TBD- 9/8/2022    NOTES STATUS DETAILS   OFFICE NOTE from referring provider     OFFICE NOTE from medical oncologist Complete  Breast Cancer Records are also in [facility] Records    DISCHARGE SUMMARY from hospital     DISCHARGE REPORT from the ER     OPERATIVE REPORT     MEDICATION LIST Complete Epic    CLINICAL TRIAL TREATMENTS TO DATE     LABS     PATHOLOGY REPORTS     ANYTHING RELATED TO DIAGNOSIS Complete Labs last updated on 8/11/2022    GENONOMIC TESTING     TYPE:     IMAGING (NEED IMAGES & REPORT)     CT SCANS     MRI     MAMMO Complete MA 7/25/2022 more in PACS   ULTRASOUND     PET

## 2022-08-31 ENCOUNTER — PATIENT OUTREACH (OUTPATIENT)
Dept: ONCOLOGY | Facility: CLINIC | Age: 62
End: 2022-08-31

## 2022-08-31 NOTE — PROGRESS NOTES
New Patient Hematology / Oncology Nurse Navigator Note     Referral Date: 8/30/22    Referring provider:       Referred to: GynOnc/MedOnc?    Requested provider (if applicable): First available - did not specify     Evaluation for : atrophic vaginitis     Clinical History (per Nurse review of records provided):    Pt with h/o DCIS and is not established with an Oncologist for her DCIS so [facility] is referring to Medical Oncology at [facility] to consult re: vaginal estrogen treatment given her past medical history.       Clinical Assessment / Barriers to Care (Per Nurse):  Patient lives in [place]    Records Location: Epic/faced from [facility] (sent to HIMs, not yet scanned)    Records Needed:   Any imaging not in Epic from [facility]?    Additional testing needed prior to consult:   N/A    Referral updates and Plan:   OUTGOING CALL to pt:  Introduced my role as nurse navigator with [facility] Hematology/Oncology dept and that we have recd the referral for dx of atrophic vaginitis from Dr Nichols.  Pt confirms they are aware of the referral. She explains Dr. Nichols wanted input from Oncology on whether she should be concerned using vaginal estrogen given her history of DCIS. Writer will review with GynOnc APPs whether best to meet with GynOnc or MedOnc to weigh in on this and follow-up with patient or have scheduling reach out to arrange appointment.    GynOnc team recommends patient continue to follow with OBGYN and meet with Medical Oncology to advise on history of DCIS. Reviewed with NAILA Yee Navigator with breast team. Nakia will reach out to patients current care team for recommendations and follow-up as needed.    Estee Billings, ELENAN, RN, PHN, OCN  Hematology/Oncology Nurse Navigator  [facility]  [phone]

## 2022-09-01 NOTE — PROGRESS NOTES
New Patient Oncology Nurse Navigator Note     Referring provider: Dr. Gena Nichols     Referring Clinic/Organization:  [facility]     Referred to (specialty:) Medical Oncology      Date Referral Received: August 31, 2022     Evaluation for:  Breast cancer     Clinical History (per Nurse review of records provided):      The patient's history dates back to 05/2020 when she was undergoing a routine screening mammogram and was found to have microcalcifications in the upper inner quadrant, posterior depth.  She proceeded to have a diagnostic mammogram, which showed a 2 cm group of amorphous calcifications at the 1 o'clock position.  A stereotactic biopsy confirmed DCIS, grade 3, ER negative.  No invasive tumor.  She was then evaluated by Dr. Elise Slater.   She underwent a seed localized right breast lumpectomy on 7/8/2020 for ER/AR negative grade 3 DCIS. She then completed radiation under the care of Dr. Santillan. Patient consulted with Dr. Radha Huerta and then followed with Dr. Elise Slater, last seen in 2021.  Dr. Gena Nichols of [facility] has recommended a vaginal estrogen cream for atrophic vaginitis.  Patient is concerned about taking this due to her breast cancer history. Dr. Slater would like patient to see medical oncology at [facility].    Records Location: Care Everywhere, Media and See Bookmarked material

## 2022-09-01 NOTE — PROGRESS NOTES
Telephoned and spoke with Madison to assist in scheduling medical oncology consult at [facility].  She prefers female provider and call transferred to New Patient Scheduling to schedule next available consult with Dr. Cerna. Nakia Garza RN

## 2022-09-09 ENCOUNTER — ONCOLOGY VISIT (OUTPATIENT)
Dept: ONCOLOGY | Facility: CLINIC | Age: 62
End: 2022-09-09
Payer: COMMERCIAL

## 2022-09-09 VITALS
RESPIRATION RATE: 16 BRPM | WEIGHT: 171.2 LBS | DIASTOLIC BLOOD PRESSURE: 74 MMHG | SYSTOLIC BLOOD PRESSURE: 111 MMHG | HEART RATE: 61 BPM | HEIGHT: 68 IN | TEMPERATURE: 98.1 F | OXYGEN SATURATION: 97 % | BODY MASS INDEX: 25.94 KG/M2

## 2022-09-09 DIAGNOSIS — D05.11 DUCTAL CARCINOMA IN SITU (DCIS) OF RIGHT BREAST: Primary | ICD-10-CM

## 2022-09-09 PROCEDURE — 99214 OFFICE O/P EST MOD 30 MIN: CPT | Performed by: INTERNAL MEDICINE

## 2022-09-09 ASSESSMENT — PAIN SCALES - GENERAL: PAINLEVEL: NO PAIN (0)

## 2022-09-09 NOTE — LETTER
9/9/2022         RE: Madison Garcia  [address] [place] [id number]        Dear Colleague,    Thank you for referring your patient, Madison Garcia, to the [facility]. Please see a copy of my visit note below.    [facility]  MEDICAL ONCOLOGY    NEW PATIENT VISIT NOTE    Reason for consultation: DCIS    Referring Provider: Gena Nichols MD    Oncology Treatment Summary  1. 4/25/99 screening mammogram negative  2. 5/26/2020 mammogram RIGHT UOQ/UIQ with calcifications  3. 5/29/2020 diagnostic mammogram with calcifications over a 20 x 6 x 5 mm area; BX with DCIS G3  4. 6/16/2020 MRI 1.4 cm nodule at bx site  5. 7/8/2020 RIGHT Lumpectomy microscopic DCIS G3 with cribiform changes, Margins negative ER-AK-  6. Adjuvant radiation completed 9/1/2020 to [id number]     HISTORY OF PRESENTING ILLNESS  Very pleasant 61 yo female presents today for medical oncology consultation. She has the above hx. She has done well on follow-up and is here today for issues with vaginal dryness. She has discomfort with intercourse and has been unable to have a pelvic exam due to this. She is wondering if she can use a vaginal estrogen tablet.     PAST MEDICAL HISTORY  MVP s/p surgery, HLD, depression      CURRENT OUTPATIENT MEDICATIONS  Current Outpatient Medications   Medication     ALPRAZolam (XANAX) 1 MG tablet     aspirin 81 MG tablet     buPROPion (WELLBUTRIN XL) 150 MG 24 hr tablet     buPROPion (WELLBUTRIN XL) 300 MG 24 hr tablet     diphenhydrAMINE-APAP, sleep, (TYLENOL PM EXTRA STRENGTH PO)     ibuprofen (ADVIL/MOTRIN) 200 MG tablet     Ibuprofen-Diphenhydramine Cit (IBUPROFEN PM PO)     melatonin 5 MG CAPS     Multiple Vitamin (DAILY MULTIVITAMIN PO)     rosuvastatin (CRESTOR) 20 MG tablet     amoxicillin (AMOXIL) 500 MG tablet     No current facility-administered medications for this visit.        ALLERGIES     Allergies   Allergen Reactions     Albumin (Human)  Difficulty breathing     Atorvastatin Muscle Pain (Myalgia)     Erythromycin      GI upset, intolerance        SOCIAL HISTORY   to Kel, works at [employer], lives in [place], no tobacco, no etoh, 2 adopted children.     FAMILY HISTORY  MGM breast cancer, PGF unknown cancer     REVIEW OF SYSTEMS  Review Of Systems  Skin: negative  Eyes: negative  Ears/Nose/Throat: negative  Respiratory: No shortness of breath, dyspnea on exertion, cough, or hemoptysis  Cardiovascular: negative  Gastrointestinal: negative  Genitourinary: negative  Musculoskeletal: negative  Neurologic: negative  Psychiatric: negative  Hematologic/Lymphatic/Immunologic: negative  Endocrine: negative      PHYSICAL EXAM  B/P: 111/74, T: 98.1, P: 61, R: 16  Wt Readings from Last 3 Encounters:   09/09/22 77.7 kg (171 lb 3.2 oz)   01/04/22 77.2 kg (170 lb 3.2 oz)   12/21/21 74.8 kg (165 lb)       ECOG PPS0    Physical Exam  Vitals reviewed.   Constitutional:       Appearance: Normal appearance. She is normal weight.   HENT:      Head: Normocephalic and atraumatic.   Eyes:      Extraocular Movements: Extraocular movements intact.      Conjunctiva/sclera: Conjunctivae normal.      Pupils: Pupils are equal, round, and reactive to light.   Pulmonary:      Effort: Pulmonary effort is normal.   Neurological:      General: No focal deficit present.      Mental Status: She is alert and oriented to person, place, and time. Mental status is at baseline.   Psychiatric:         Mood and Affect: Mood normal.         Behavior: Behavior normal.         Thought Content: Thought content normal.         Judgment: Judgment normal.              LABORATORY AND IMAGING STUDIES  Recent Labs   Lab Test 01/04/22  0747 12/10/20  0744 06/29/20  1605 12/02/19  0937 11/08/18  0832    139 138 141 142   POTASSIUM 4.2 4.5 4.4 4.3 4.0   CHLORIDE 106 106 106 107 106   CO2 28 28 27 27 29   ANIONGAP 5 5 5 7 7   BUN 9 16 12 15 12   CR 0.86 0.95 0.85 0.96 0.88   GLC  99 89 93 86 86   JANIS 8.9 9.1 9.2 9.7 9.2     No results for input(s): MAG, PHOS in the last 07083 hours.  Recent Labs   Lab Test 01/04/22  0747 12/10/20  0744 06/29/20  1605 12/02/19  0937 11/08/18  0832   WBC 4.7 3.8* 4.4 4.1 4.5   HGB 12.7 12.3 12.3 12.9 12.7    279 266 302 285   MCV 91 89 88 89 88   NEUTROPHIL  --   --  50.9  --   --      Recent Labs   Lab Test 01/04/22  0747 12/10/20  0744 12/02/19  0937   BILITOTAL 0.6 0.4 0.6   ALKPHOS 63 64 66   ALT 26 31 30   AST 20 22 20   ALBUMIN 3.7 3.5 3.8     TSH   Date Value Ref Range Status   08/11/2022 4.85 (H) 0.40 - 4.00 mU/L Final   01/04/2022 6.80 (H) 0.40 - 4.00 mU/L Final   12/10/2020 6.14 (H) 0.40 - 4.00 mU/L Final   12/02/2019 5.54 (H) 0.40 - 4.00 mU/L Final   11/08/2018 4.26 (H) 0.40 - 4.00 mU/L Final     No results for input(s): CEA in the last 03709 hours.  Results for orders placed or performed in visit on 07/25/22   MA Diagnostic Bilateral w/Abad    Narrative    Examination: Bilateral digital diagnostic mammography and digital  breast tomosynthesis with computer aided detection.    Comparison: 12/7/2021    History/Family history: Follow-up calcifications. History of right  breast cancer, status post breast conservation therapy.    Technique:  Tomosynthesis    BREAST DENSITY: Heterogeneously dense.    Findings: Breast conservation changes right breast. No significant  change. No suspicious findings.          Impression    IMPRESSION: BI-RADS CATEGORY: 2 - Benign.    RECOMMENDED FOLLOW-UP: Annual Mammography.          The patient was given the results of the examination.      BEN ERNST MD         SYSTEM ID:  [id number]        ASSESSMENT  AND RECOMMENDATIONS:    1. 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 Vaginal dryness     PLAN      We discussed the natural history of breast cancer in general and DCIS in specific and reviewed treatment options. She  had a small DCIS ER- s/p lumpectomy and adjuvant radiation and risk of local recurrence in the breast is approximately 5%. Given her disease was estrogen and progesterone receptor negative there is no benefit to the use of tamoxifen or other antiestrogens. At this point she should have an annual mammogram and exam.     She does not meet criteria for genetic testing.    We then dicussed the use of vaginal estrogens. Given she had a Stage 0 DCIS with clear margins and adjuvant radiation and it was ER-HI-  She is at low risk of recurrence. She has significant vaginal symptoms from estrogen deficiency. There is little systemic absorption from these creams/vaginal inserts and I think risk vs benefits that she would be fine to use the vaginal estrogens with a low risk towards her breast cancer.    She will try them and see me back next July with a mammogram.    Elva Cerna MD on 9/9/2022 at 12:03 PM             Again, thank you for allowing me to participate in the care of your patient.        Sincerely,        Elva Cerna MD

## 2022-09-09 NOTE — NURSING NOTE
"Oncology Rooming Note    September 9, 2022 11:03 AM   Madison Garcia is a 62 year old female who presents for:    Chief Complaint   Patient presents with     Oncology Clinic Visit     New patient     Initial Vitals: /74 (BP Location: Left arm)   Pulse 61   Temp 98.1  F (36.7  C) (Oral)   Resp 16   Ht 1.727 m (5' 8\")   Wt 77.7 kg (171 lb 3.2 oz)   SpO2 97%   BMI 26.03 kg/m   Estimated body mass index is 26.03 kg/m  as calculated from the following:    Height as of this encounter: 1.727 m (5' 8\").    Weight as of this encounter: 77.7 kg (171 lb 3.2 oz). Body surface area is 1.93 meters squared.  No Pain (0) Comment: Data Unavailable   No LMP recorded. Patient is postmenopausal.  Allergies reviewed: Yes  Medications reviewed: Yes    Medications: Medication refills not needed today.  Pharmacy name entered into [facility]:    [id number] [place], [place] - [address] [facility] [address]  [id number] [place], [place] - [address], [place] AT [facility]  WRITTEN PRESCRIPTION REQUESTED  [facility] [address] [place] [address] [id number] - [place], [place] - [address] AT [facility] & [facility]    Clinical concerns: New Patient- Breast Cancer       Paige Washington LPN              "

## 2022-09-09 NOTE — PROGRESS NOTES
[facility] PHYSICIANS  MEDICAL ONCOLOGY    NEW PATIENT VISIT NOTE    Reason for consultation: DCIS    Referring Provider: Gena Nichols MD    Oncology Treatment Summary  1. 4/25/99 screening mammogram negative  2. 5/26/2020 mammogram RIGHT UOQ/UIQ with calcifications  3. 5/29/2020 diagnostic mammogram with calcifications over a 20 x 6 x 5 mm area; BX with DCIS G3  4. 6/16/2020 MRI 1.4 cm nodule at bx site  5. 7/8/2020 RIGHT Lumpectomy microscopic DCIS G3 with cribiform changes, Margins negative ER-CT-  6. Adjuvant radiation completed 9/1/2020 to [id number]     HISTORY OF PRESENTING ILLNESS  Very pleasant 61 yo female presents today for medical oncology consultation. She has the above hx. She has done well on follow-up and is here today for issues with vaginal dryness. She has discomfort with intercourse and has been unable to have a pelvic exam due to this. She is wondering if she can use a vaginal estrogen tablet.     PAST MEDICAL HISTORY  MVP s/p surgery, HLD, depression      CURRENT OUTPATIENT MEDICATIONS  Current Outpatient Medications   Medication     ALPRAZolam (XANAX) 1 MG tablet     aspirin 81 MG tablet     buPROPion (WELLBUTRIN XL) 150 MG 24 hr tablet     buPROPion (WELLBUTRIN XL) 300 MG 24 hr tablet     diphenhydrAMINE-APAP, sleep, (TYLENOL PM EXTRA STRENGTH PO)     ibuprofen (ADVIL/MOTRIN) 200 MG tablet     Ibuprofen-Diphenhydramine Cit (IBUPROFEN PM PO)     melatonin 5 MG CAPS     Multiple Vitamin (DAILY MULTIVITAMIN PO)     rosuvastatin (CRESTOR) 20 MG tablet     amoxicillin (AMOXIL) 500 MG tablet     No current facility-administered medications for this visit.        ALLERGIES     Allergies   Allergen Reactions     Albumin (Human) Difficulty breathing     Atorvastatin Muscle Pain (Myalgia)     Erythromycin      GI upset, intolerance        SOCIAL HISTORY   to Kel, works at [employer], lives in [place], no tobacco, no etoh, 2 adopted children.     FAMILY HISTORY  MGM  breast cancer, PGF unknown cancer     REVIEW OF SYSTEMS  Review Of Systems  Skin: negative  Eyes: negative  Ears/Nose/Throat: negative  Respiratory: No shortness of breath, dyspnea on exertion, cough, or hemoptysis  Cardiovascular: negative  Gastrointestinal: negative  Genitourinary: negative  Musculoskeletal: negative  Neurologic: negative  Psychiatric: negative  Hematologic/Lymphatic/Immunologic: negative  Endocrine: negative      PHYSICAL EXAM  B/P: 111/74, T: 98.1, P: 61, R: 16  Wt Readings from Last 3 Encounters:   09/09/22 77.7 kg (171 lb 3.2 oz)   01/04/22 77.2 kg (170 lb 3.2 oz)   12/21/21 74.8 kg (165 lb)       ECOG PPS0    Physical Exam  Vitals reviewed.   Constitutional:       Appearance: Normal appearance. She is normal weight.   HENT:      Head: Normocephalic and atraumatic.   Eyes:      Extraocular Movements: Extraocular movements intact.      Conjunctiva/sclera: Conjunctivae normal.      Pupils: Pupils are equal, round, and reactive to light.   Pulmonary:      Effort: Pulmonary effort is normal.   Neurological:      General: No focal deficit present.      Mental Status: She is alert and oriented to person, place, and time. Mental status is at baseline.   Psychiatric:         Mood and Affect: Mood normal.         Behavior: Behavior normal.         Thought Content: Thought content normal.         Judgment: Judgment normal.              LABORATORY AND IMAGING STUDIES  Recent Labs   Lab Test 01/04/22  0747 12/10/20  0744 06/29/20  1605 12/02/19  0937 11/08/18  0832    139 138 141 142   POTASSIUM 4.2 4.5 4.4 4.3 4.0   CHLORIDE 106 106 106 107 106   CO2 28 28 27 27 29   ANIONGAP 5 5 5 7 7   BUN 9 16 12 15 12   CR 0.86 0.95 0.85 0.96 0.88   GLC 99 89 93 86 86   JANIS 8.9 9.1 9.2 9.7 9.2     No results for input(s): MAG, PHOS in the last 34769 hours.  Recent Labs   Lab Test 01/04/22  0747 12/10/20  0744 06/29/20  1605 12/02/19  0937 11/08/18  0832   WBC 4.7 3.8* 4.4 4.1 4.5   HGB 12.7 12.3 12.3 12.9 12.7     279 266 302 285   MCV 91 89 88 89 88   NEUTROPHIL  --   --  50.9  --   --      Recent Labs   Lab Test 01/04/22  0747 12/10/20  0744 12/02/19  0937   BILITOTAL 0.6 0.4 0.6   ALKPHOS 63 64 66   ALT 26 31 30   AST 20 22 20   ALBUMIN 3.7 3.5 3.8     TSH   Date Value Ref Range Status   08/11/2022 4.85 (H) 0.40 - 4.00 mU/L Final   01/04/2022 6.80 (H) 0.40 - 4.00 mU/L Final   12/10/2020 6.14 (H) 0.40 - 4.00 mU/L Final   12/02/2019 5.54 (H) 0.40 - 4.00 mU/L Final   11/08/2018 4.26 (H) 0.40 - 4.00 mU/L Final     No results for input(s): CEA in the last 79375 hours.  Results for orders placed or performed in visit on 07/25/22   MA Diagnostic Bilateral w/Abad    Narrative    Examination: Bilateral digital diagnostic mammography and digital  breast tomosynthesis with computer aided detection.    Comparison: 12/7/2021    History/Family history: Follow-up calcifications. History of right  breast cancer, status post breast conservation therapy.    Technique:  Tomosynthesis    BREAST DENSITY: Heterogeneously dense.    Findings: Breast conservation changes right breast. No significant  change. No suspicious findings.          Impression    IMPRESSION: BI-RADS CATEGORY: 2 - Benign.    RECOMMENDED FOLLOW-UP: Annual Mammography.          The patient was given the results of the examination.      BEN ERNST MD         SYSTEM ID:  [id number]        ASSESSMENT  AND RECOMMENDATIONS:    1. 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 Vaginal dryness     PLAN      We discussed the natural history of breast cancer in general and DCIS in specific and reviewed treatment options. She had a small DCIS ER- s/p lumpectomy and adjuvant radiation and risk of local recurrence in the breast is approximately 5%. Given her disease was estrogen and progesterone receptor negative there is no benefit to the use of tamoxifen or other antiestrogens. At this  point she should have an annual mammogram and exam.     She does not meet criteria for genetic testing.    We then dicussed the use of vaginal estrogens. Given she had a Stage 0 DCIS with clear margins and adjuvant radiation and it was ER-WA-  She is at low risk of recurrence. She has significant vaginal symptoms from estrogen deficiency. There is little systemic absorption from these creams/vaginal inserts and I think risk vs benefits that she would be fine to use the vaginal estrogens with a low risk towards her breast cancer.    She will try them and see me back next July with a mammogram.    Elva Cerna MD on 9/9/2022 at 12:03 PM

## 2022-10-22 ENCOUNTER — HEALTH MAINTENANCE LETTER (OUTPATIENT)
Age: 62
End: 2022-10-22

## 2022-10-25 DIAGNOSIS — F41.9 ANXIETY: ICD-10-CM

## 2022-10-26 RX ORDER — ALPRAZOLAM 1 MG
TABLET ORAL
Qty: 60 TABLET | Refills: 0 | Status: SHIPPED | OUTPATIENT
Start: 2022-10-26 | End: 2022-12-22

## 2022-11-01 ENCOUNTER — TRANSFERRED RECORDS (OUTPATIENT)
Dept: MULTI SPECIALTY CLINIC | Facility: CLINIC | Age: 62
End: 2022-11-01

## 2022-11-01 LAB — PAP SMEAR - HIM PATIENT REPORTED: NEGATIVE

## 2022-11-02 LAB
HPV ABSTRACT: NORMAL
PAP-ABSTRACT: NORMAL

## 2022-11-18 ENCOUNTER — IMMUNIZATION (OUTPATIENT)
Dept: FAMILY MEDICINE | Facility: CLINIC | Age: 62
End: 2022-11-18
Payer: COMMERCIAL

## 2022-11-18 DIAGNOSIS — Z23 HIGH PRIORITY FOR 2019-NCOV VACCINE: Primary | ICD-10-CM

## 2022-11-18 PROCEDURE — 0124A COVID-19,PF,PFIZER BOOSTER BIVALENT: CPT

## 2022-11-18 PROCEDURE — 99207 PR NO CHARGE NURSE ONLY: CPT

## 2022-11-18 PROCEDURE — 91312 COVID-19,PF,PFIZER BOOSTER BIVALENT: CPT

## 2022-11-29 DIAGNOSIS — F33.41 RECURRENT MAJOR DEPRESSIVE DISORDER, IN PARTIAL REMISSION (H): Chronic | ICD-10-CM

## 2022-11-29 NOTE — TELEPHONE ENCOUNTER
LOV 1-4-2022 Inna    Appointments in Next Year    Mar 31, 2023  7:30 AM  (Arrive by 7:10 AM)  Adult Preventative Visit with Tera Keith MD  [facility] ([facility] ) [phone]          Jaqui Georges RT (R)

## 2022-12-02 RX ORDER — BUPROPION HYDROCHLORIDE 300 MG/1
TABLET ORAL
Qty: 90 TABLET | Refills: 0 | Status: SHIPPED | OUTPATIENT
Start: 2022-12-02 | End: 2023-03-24

## 2022-12-02 RX ORDER — BUPROPION HYDROCHLORIDE 150 MG/1
TABLET ORAL
Qty: 90 TABLET | Refills: 0 | Status: SHIPPED | OUTPATIENT
Start: 2022-12-02 | End: 2023-02-21

## 2022-12-02 NOTE — TELEPHONE ENCOUNTER
Routing refill request to provider for review/approval because:  Last PHQ9 score of 7     Appointments in Next Year      Mar 31, 2023  7:30 AM  (Arrive by 7:10 AM)  Adult Preventative Visit with Tera Keith MD  [facility] ([facility] ) [phone]

## 2022-12-22 DIAGNOSIS — F41.9 ANXIETY: ICD-10-CM

## 2022-12-22 RX ORDER — ALPRAZOLAM 1 MG
TABLET ORAL
Qty: 60 TABLET | Refills: 0 | Status: SHIPPED | OUTPATIENT
Start: 2022-12-22 | End: 2023-02-22

## 2023-01-27 DIAGNOSIS — I25.83 CORONARY ARTERY DISEASE DUE TO LIPID RICH PLAQUE: ICD-10-CM

## 2023-01-27 DIAGNOSIS — I25.10 CORONARY ARTERY DISEASE DUE TO LIPID RICH PLAQUE: ICD-10-CM

## 2023-01-27 RX ORDER — ROSUVASTATIN CALCIUM 20 MG/1
20 TABLET, COATED ORAL DAILY
Qty: 90 TABLET | Refills: 3 | Status: SHIPPED | OUTPATIENT
Start: 2023-01-27 | End: 2023-08-25

## 2023-01-27 NOTE — TELEPHONE ENCOUNTER
Patient calling to request refills for rosuvastatin 20 mg tablets as she will be out of script soon. Patient has OV scheduled with PCP for 3/31/2023 and will need script filled.    Script and pharmacy pended for review. Routing to refill pool.

## 2023-02-21 DIAGNOSIS — F33.41 RECURRENT MAJOR DEPRESSIVE DISORDER, IN PARTIAL REMISSION (H): Chronic | ICD-10-CM

## 2023-02-21 DIAGNOSIS — F41.9 ANXIETY: ICD-10-CM

## 2023-02-21 RX ORDER — BUPROPION HYDROCHLORIDE 150 MG/1
TABLET ORAL
Qty: 90 TABLET | Refills: 0 | Status: SHIPPED | OUTPATIENT
Start: 2023-02-21 | End: 2023-04-03

## 2023-02-22 RX ORDER — ALPRAZOLAM 1 MG
TABLET ORAL
Qty: 60 TABLET | Refills: 0 | Status: SHIPPED | OUTPATIENT
Start: 2023-02-22 | End: 2023-04-03

## 2023-03-24 ENCOUNTER — TELEPHONE (OUTPATIENT)
Dept: FAMILY MEDICINE | Facility: CLINIC | Age: 63
End: 2023-03-24
Payer: COMMERCIAL

## 2023-03-24 DIAGNOSIS — F33.41 RECURRENT MAJOR DEPRESSIVE DISORDER, IN PARTIAL REMISSION (H): Chronic | ICD-10-CM

## 2023-03-24 RX ORDER — BUPROPION HYDROCHLORIDE 300 MG/1
TABLET ORAL
Qty: 90 TABLET | Refills: 0 | Status: SHIPPED | OUTPATIENT
Start: 2023-03-24 | End: 2023-04-03

## 2023-03-24 NOTE — TELEPHONE ENCOUNTER
TO PCP:     Pt called requesting a refill for Bupropion 300mg XL   Received the 150mg dose but needs this one as well     Last OV with PCP 1/4/22 - advised she is due for appt with you. States she tried scheduling but didn't want to wait for your first available annual exam, so for this year decided to go to [facility] to be seen for annual exam but then plans to see you in the future (unless you are able to see her soon/work her in for annual exam?)     Rx is pended     Appointments in Next Year    Apr 03, 2023  7:30 AM  (Arrive by 7:10 AM)  Adult Preventative Visit with Scotty Veliz MD  [facility] ([facility] ) [phone]   Jul 14, 2023  2:30 PM  (Arrive by 2:15 PM)  MA SCREENING DIGITAL BILATERAL with MGMA1  [facility] ([facility]) [phone]   Jul 14, 2023  3:00 PM  Return Patient with Elva Cerna MD  [facility] [place] ([facility]) [phone]        Nishant Knutson RN  [facility]

## 2023-04-01 ENCOUNTER — HEALTH MAINTENANCE LETTER (OUTPATIENT)
Age: 63
End: 2023-04-01

## 2023-04-02 ASSESSMENT — ENCOUNTER SYMPTOMS
EYE PAIN: 0
NAUSEA: 0
HEADACHES: 0
NERVOUS/ANXIOUS: 0
DIARRHEA: 0
JOINT SWELLING: 0
FEVER: 0
COUGH: 0
PALPITATIONS: 0
ARTHRALGIAS: 1
PARESTHESIAS: 0
MYALGIAS: 1
ABDOMINAL PAIN: 0
CONSTIPATION: 0
WEAKNESS: 0
SORE THROAT: 0
BREAST MASS: 0
HEARTBURN: 0
DYSURIA: 0
HEMATOCHEZIA: 0
DIZZINESS: 0
FREQUENCY: 0
CHILLS: 0
HEMATURIA: 0
SHORTNESS OF BREATH: 0

## 2023-04-03 ENCOUNTER — OFFICE VISIT (OUTPATIENT)
Dept: FAMILY MEDICINE | Facility: CLINIC | Age: 63
End: 2023-04-03
Payer: COMMERCIAL

## 2023-04-03 VITALS
WEIGHT: 172.1 LBS | HEIGHT: 69 IN | BODY MASS INDEX: 25.49 KG/M2 | OXYGEN SATURATION: 99 % | HEART RATE: 78 BPM | TEMPERATURE: 99.7 F | DIASTOLIC BLOOD PRESSURE: 72 MMHG | SYSTOLIC BLOOD PRESSURE: 108 MMHG

## 2023-04-03 DIAGNOSIS — F41.9 ANXIETY: ICD-10-CM

## 2023-04-03 DIAGNOSIS — Z00.00 ROUTINE GENERAL MEDICAL EXAMINATION AT A HEALTH CARE FACILITY: ICD-10-CM

## 2023-04-03 DIAGNOSIS — E78.5 HYPERLIPIDEMIA LDL GOAL <100: Primary | ICD-10-CM

## 2023-04-03 DIAGNOSIS — F33.41 RECURRENT MAJOR DEPRESSIVE DISORDER, IN PARTIAL REMISSION (H): Chronic | ICD-10-CM

## 2023-04-03 LAB
ALBUMIN SERPL-MCNC: 3.8 G/DL (ref 3.4–5)
ALP SERPL-CCNC: 67 U/L (ref 40–150)
ALT SERPL W P-5'-P-CCNC: 30 U/L (ref 0–50)
ANION GAP SERPL CALCULATED.3IONS-SCNC: 3 MMOL/L (ref 3–14)
AST SERPL W P-5'-P-CCNC: 22 U/L (ref 0–45)
BASOPHILS # BLD AUTO: 0 10E3/UL (ref 0–0.2)
BASOPHILS NFR BLD AUTO: 1 %
BILIRUB SERPL-MCNC: 0.5 MG/DL (ref 0.2–1.3)
BUN SERPL-MCNC: 13 MG/DL (ref 7–30)
CALCIUM SERPL-MCNC: 9.3 MG/DL (ref 8.5–10.1)
CHLORIDE BLD-SCNC: 108 MMOL/L (ref 94–109)
CHOLEST SERPL-MCNC: 141 MG/DL
CO2 SERPL-SCNC: 29 MMOL/L (ref 20–32)
CREAT SERPL-MCNC: 0.85 MG/DL (ref 0.52–1.04)
EOSINOPHIL # BLD AUTO: 0.1 10E3/UL (ref 0–0.7)
EOSINOPHIL NFR BLD AUTO: 1 %
ERYTHROCYTE [DISTWIDTH] IN BLOOD BY AUTOMATED COUNT: 12 % (ref 10–15)
FASTING STATUS PATIENT QL REPORTED: YES
GFR SERPL CREATININE-BSD FRML MDRD: 78 ML/MIN/1.73M2
GLUCOSE BLD-MCNC: 99 MG/DL (ref 70–99)
HCT VFR BLD AUTO: 37.1 % (ref 35–47)
HDLC SERPL-MCNC: 58 MG/DL
HGB BLD-MCNC: 12.2 G/DL (ref 11.7–15.7)
IMM GRANULOCYTES # BLD: 0 10E3/UL
IMM GRANULOCYTES NFR BLD: 0 %
LDLC SERPL CALC-MCNC: 64 MG/DL
LYMPHOCYTES # BLD AUTO: 1 10E3/UL (ref 0.8–5.3)
LYMPHOCYTES NFR BLD AUTO: 28 %
MCH RBC QN AUTO: 29.2 PG (ref 26.5–33)
MCHC RBC AUTO-ENTMCNC: 32.9 G/DL (ref 31.5–36.5)
MCV RBC AUTO: 89 FL (ref 78–100)
MONOCYTES # BLD AUTO: 0.2 10E3/UL (ref 0–1.3)
MONOCYTES NFR BLD AUTO: 7 %
NEUTROPHILS # BLD AUTO: 2.2 10E3/UL (ref 1.6–8.3)
NEUTROPHILS NFR BLD AUTO: 63 %
NONHDLC SERPL-MCNC: 83 MG/DL
PLATELET # BLD AUTO: 269 10E3/UL (ref 150–450)
POTASSIUM BLD-SCNC: 4.3 MMOL/L (ref 3.4–5.3)
PROT SERPL-MCNC: 7.3 G/DL (ref 6.8–8.8)
RBC # BLD AUTO: 4.18 10E6/UL (ref 3.8–5.2)
SODIUM SERPL-SCNC: 140 MMOL/L (ref 133–144)
TRIGL SERPL-MCNC: 96 MG/DL
WBC # BLD AUTO: 3.5 10E3/UL (ref 4–11)

## 2023-04-03 PROCEDURE — 85025 COMPLETE CBC W/AUTO DIFF WBC: CPT | Performed by: INTERNAL MEDICINE

## 2023-04-03 PROCEDURE — 80053 COMPREHEN METABOLIC PANEL: CPT | Performed by: INTERNAL MEDICINE

## 2023-04-03 PROCEDURE — 99214 OFFICE O/P EST MOD 30 MIN: CPT | Mod: 25 | Performed by: INTERNAL MEDICINE

## 2023-04-03 PROCEDURE — 80061 LIPID PANEL: CPT | Performed by: INTERNAL MEDICINE

## 2023-04-03 PROCEDURE — 36415 COLL VENOUS BLD VENIPUNCTURE: CPT | Performed by: INTERNAL MEDICINE

## 2023-04-03 PROCEDURE — 99396 PREV VISIT EST AGE 40-64: CPT | Performed by: INTERNAL MEDICINE

## 2023-04-03 RX ORDER — BUPROPION HYDROCHLORIDE 300 MG/1
TABLET ORAL
Qty: 90 TABLET | Refills: 3 | Status: SHIPPED | OUTPATIENT
Start: 2023-04-03 | End: 2023-06-21

## 2023-04-03 RX ORDER — BUPROPION HYDROCHLORIDE 150 MG/1
TABLET ORAL
Qty: 90 TABLET | Refills: 3 | Status: SHIPPED | OUTPATIENT
Start: 2023-04-03 | End: 2023-08-25

## 2023-04-03 RX ORDER — ALPRAZOLAM 1 MG
TABLET ORAL
Qty: 60 TABLET | Refills: 0 | Status: SHIPPED | OUTPATIENT
Start: 2023-04-03 | End: 2023-06-21

## 2023-04-03 ASSESSMENT — ENCOUNTER SYMPTOMS
SORE THROAT: 0
JOINT SWELLING: 0
HEMATOCHEZIA: 0
FREQUENCY: 0
NAUSEA: 0
HEARTBURN: 0
DIZZINESS: 0
CHILLS: 0
FEVER: 0
MYALGIAS: 1
COUGH: 0
HEMATURIA: 0
CONSTIPATION: 0
BREAST MASS: 0
ARTHRALGIAS: 1
DIARRHEA: 0
SHORTNESS OF BREATH: 0
DYSURIA: 0
ABDOMINAL PAIN: 0
HEADACHES: 0
PARESTHESIAS: 0
EYE PAIN: 0
PALPITATIONS: 0
WEAKNESS: 0
NERVOUS/ANXIOUS: 0

## 2023-04-03 ASSESSMENT — PATIENT HEALTH QUESTIONNAIRE - PHQ9
SUM OF ALL RESPONSES TO PHQ QUESTIONS 1-9: 2
10. IF YOU CHECKED OFF ANY PROBLEMS, HOW DIFFICULT HAVE THESE PROBLEMS MADE IT FOR YOU TO DO YOUR WORK, TAKE CARE OF THINGS AT HOME, OR GET ALONG WITH OTHER PEOPLE: NOT DIFFICULT AT ALL
SUM OF ALL RESPONSES TO PHQ QUESTIONS 1-9: 2

## 2023-04-03 NOTE — PROGRESS NOTES
SUBJECTIVE:   CC: Madison is an 63 year old who presents for preventive health visit.       4/3/2023     7:20 AM   Additional Questions   Roomed by Cherry   Accompanied by Self         4/3/2023     7:20 AM   Patient Reported Additional Medications   Patient reports taking the following new medications None     Patient has been advised of split billing requirements and indicates understanding: Yes  Healthy Habits:     Getting at least 3 servings of Calcium per day:  Yes    Bi-annual eye exam:  Yes    Dental care twice a year:  Yes    Sleep apnea or symptoms of sleep apnea:  None    Diet:  Vegetarian/vegan    Frequency of exercise:  2-3 days/week    Duration of exercise:  15-30 minutes    Taking medications regularly:  Yes    Medication side effects:  None    PHQ-2 Total Score: 0    Additional concerns today:  No              Today's PHQ-2 Score:       4/2/2023    12:18 PM   PHQ-2 ( 1999 [employer])   Q1: Little interest or pleasure in doing things 0   Q2: Feeling down, depressed or hopeless 0   PHQ-2 Score 0   Q1: Little interest or pleasure in doing things Not at all   Q2: Feeling down, depressed or hopeless Not at all   PHQ-2 Score 0           Social History     Tobacco Use     Smoking status: Never     Smokeless tobacco: Never   Vaping Use     Vaping status: Never Used   Substance Use Topics     Alcohol use: Yes     Alcohol/week: 0.0 standard drinks of alcohol     Comment: 1-2/week              4/2/2023    12:18 PM   Alcohol Use   Prescreen: >3 drinks/day or >7 drinks/week? No          View : No data to display.              Reviewed orders with patient.  Reviewed health maintenance and updated orders accordingly - Yes  Lab work is in process    Breast Cancer Screening:    FHS-7:       7/21/2021     8:09 AM 12/7/2021     7:53 AM 1/2/2022     5:42 PM 7/22/2022    10:18 AM 7/25/2022     3:44 PM   Breast CA Risk Assessment (FHS-7)   Did any of your first-degree relatives have breast or ovarian cancer? No No No No No   Did  any of your relatives have bilateral breast cancer? No No No No No   Did any man in your family have breast cancer? No No No No No   Did any woman in your family have breast and ovarian cancer? No No No No No   Did any woman in your family have breast cancer before age 50 y? No No No No No   Do you have 2 or more relatives with breast and/or ovarian cancer? No No No No No   Do you have 2 or more relatives with breast and/or bowel cancer? No No No No No     click delete button to remove this line now  Mammogram Screening: Recommended mammography every 1-2 years with patient discussion and risk factor consideration  Pertinent mammograms are reviewed under the imaging tab.    History of abnormal Pap smear: NO - age 30-65 PAP every 5 years with negative HPV co-testing recommended      Latest Ref Rng & Units 11/2/2015     9:18 AM 11/2/2015    12:00 AM 11/26/2012    11:35 AM   PAP / HPV   PAP (Historical)   NIL   NIL     HPV 16 DNA NEG Negative       HPV 18 DNA NEG Negative       Other HR HPV NEG Negative         Reviewed and updated as needed this visit by clinical staff                  Reviewed and updated as needed this visit by Provider                     Review of Systems   Constitutional: Negative for chills and fever.   HENT: Negative for congestion, ear pain, hearing loss and sore throat.    Eyes: Negative for pain and visual disturbance.   Respiratory: Negative for cough and shortness of breath.    Cardiovascular: Negative for chest pain, palpitations and peripheral edema.   Gastrointestinal: Negative for abdominal pain, constipation, diarrhea, heartburn, hematochezia and nausea.   Breasts:  Negative for tenderness, breast mass and discharge.   Genitourinary: Negative for dysuria, frequency, genital sores, hematuria, pelvic pain, urgency, vaginal bleeding and vaginal discharge.   Musculoskeletal: Positive for arthralgias and myalgias. Negative for joint swelling.   Skin: Negative for rash.   Neurological:  "Negative for dizziness, weakness, headaches and paresthesias.   Psychiatric/Behavioral: Negative for mood changes. The patient is not nervous/anxious.           OBJECTIVE:   /72 (BP Location: Right arm, Patient Position: Sitting, Cuff Size: Adult Regular)   Pulse 78   Temp 99.7  F (37.6  C) (Oral)   Ht 1.74 m (5' 8.5\")   Wt 78.1 kg (172 lb 1.6 oz)   SpO2 99%   BMI 25.78 kg/m    Physical Exam  GENERAL: healthy, alert and no distress  EYES: Eyes grossly normal to inspection, PERRL and conjunctivae and sclerae normal  HENT: ear canals and TM's normal, nose and mouth without ulcers or lesions  NECK: no adenopathy, no asymmetry, masses, or scars and thyroid normal to palpation  RESP: lungs clear to auscultation - no rales, rhonchi or wheezes  CV: regular rate and rhythm, normal S1 S2, no S3 or S4, no murmur, click or rub, no peripheral edema and peripheral pulses strong  ABDOMEN: soft, nontender, no hepatosplenomegaly, no masses and bowel sounds normal  MS: no gross musculoskeletal defects noted, no edema  SKIN: no suspicious lesions or rashes  NEURO: Normal strength and tone, mentation intact and speech normal  PSYCH: mentation appears normal, affect normal/bright    Diagnostic Test Results:  Labs reviewed in Epic    ASSESSMENT/PLAN:   (E78.5) Hyperlipidemia LDL goal <100  (primary encounter diagnosis)  Comment: She is on Crestor  Her lipid panel is doing good when it was checked last year  Plan: Lipid panel reflex to direct LDL Fasting            (F33.41) Recurrent major depressive disorder, in partial remission (H)  Comment: Her PHQ-9 score is very good today and under 5  Continue with Wellbutrin  Plan: buPROPion (WELLBUTRIN XL) 150 MG 24 hr tablet,         buPROPion (WELLBUTRIN XL) 300 MG 24 hr tablet            (Z00.00) Routine general medical examination at a health care facility  Comment: She is up-to-date with colonoscopy  She is also up-to-date with mammograms  She had a Pap smear done recently and " "she will send us the results  She gets this done at a gynecology clinic  Discussed about shingles vaccine  Discussed about the side effect of the same  She is going to look into that  Plan: Comprehensive metabolic panel (BMP + Alb, Alk         Phos, ALT, AST, Total. Bili, TP), CBC with         platelets and differential            (F41.9) Anxiety  Comment: She takes this primarily for insomnia  I discussed about the FDA black box warnings about benzos  This cannot be stopped suddenly  She has been taking it for a long time  We have to consider slowly tapering it at some point  I discussed the alternatives for insomnia like hydroxyzine and valerian root  Plan: ALPRAZolam (XANAX) 1 MG tablet              Patient has been advised of split billing requirements and indicates understanding: No      COUNSELING:  Reviewed preventive health counseling, as reflected in patient instructions       Regular exercise       Healthy diet/nutrition       Vision screening       Hearing screening       Colorectal Cancer Screening      BMI:   Estimated body mass index is 25.78 kg/m  as calculated from the following:    Height as of this encounter: 1.74 m (5' 8.5\").    Weight as of this encounter: 78.1 kg (172 lb 1.6 oz).   Weight management plan: Discussed healthy diet and exercise guidelines      She reports that she has never smoked. She has never used smokeless tobacco.          Scotty Veliz MD  [facility]  Answers for HPI/ROS submitted by the patient on 4/3/2023  If you checked off any problems, how difficult have these problems made it for you to do your work, take care of things at home, or get along with other people?: Not difficult at all  PHQ9 TOTAL SCORE: 2      "

## 2023-05-15 NOTE — ADDENDUM NOTE
Celina Soriano is a 63 year old female presenting f/u from her surgery.     Concerns: States she will like to discuss her experience with Dr. Daniel Narayan. Pt states she did not have a good experience with him and his care team. \"They kept giving me Vicodin 5 and Vicodin 10. They gave Morphine. My pain was a 10 for 10 hours during the day and they didn't give me anything for my pain and didn't listen to me\" Voices she was not happy with her time of the surgery with Dr. Daniel Narayan because her pain was never under controlled. States during the time of her stay they called her names and made her feel uneducated. States she went through an acute narcotic withdrawal due to having to take Morphine for 25 days post surgery.    Medications verified, no changes. Pharmacy verified.     Allergies verified. Denies known Latex allergy or symptoms of Latex sensitivity.    Social History     Tobacco Use   Smoking Status Former Smoker   • Packs/day: 0.50   • Years: 8.00   • Pack years: 4.00   • Types: Cigarettes   • Last attempt to quit: 1980   • Years since quittin.1   Smokeless Tobacco Never Used       Health Maintenance Due   Topic Date Due   • Shingles Vaccine (1 of 2) 2008   • Breast Cancer Screening  2018       Patient is due for topics as listed above but is not proceeding. States she will not be getting the shingles vaccination because at 61 she had the shingles.    Unaddressed Risk Adjusted HCC Categories and Diagnoses     - Vascular Disease   Unaddressed Dx:Claudication (Cms/Hcc)      Advance Directives:  not discussed    Patient would like communication of their results via:      Cell Phone:   Telephone Information:   Mobile [phone]   Okay to leave a message containing results? Yes         Encounter addended by: Clifford Dhillon on: 5/15/2023 12:44 PM   Actions taken: Clinical Note Signed, Episode resolved

## 2023-05-15 NOTE — PROGRESS NOTES
[facility]    Outpatient Occupational Therapy Discharge Note  Patient: Madison Garcia  : 1960    Beginning/End Dates of Reporting Period:  22 to 5/15/23    Referring Provider: Elise Garcia Diagnosis: Axillary cording; axillary pain    Client Self Report:      Objective Measurements:                                                        Outcome Measures (most recent score):  Lymphedema Life Impact Scale (score range 0-72). A higher score indicates greater impairment.: 2-pre score      Goals:   Goal Identifier MLD/MFR/HEP   Goal Description Demonstrate independence in performing prescribed exercises to facilate the lymph system, muslce pumping systems, and self MLD/MFR for home management to remodel scar tissue R chest wall for releif in discomfort for improved reaching and functional use RUE   Target Date 22   Date Met      Progress (detail required for progress note):       Goal Identifier Garments   Goal Description Be independent in donning/doffing, wearing schedule, and care of compression garments for I building with home management R sided chest wall scar tissue and STARR for reduction in discomfort needed to promote optimal AROM RUE use and lessen discomfort for reaching an dfunctional use RUE with daily activity   Target Date 22   Date Met      Progress (detail required for progress note):       Goal Identifier Discomfort   Goal Description Pt will report a reduction in the discomfort she has R trunk/R axilla for pain releif needed to improve reaching task engagement and overal funtional use RUE with daily activity   Target Date 22   Date Met      Progress (detail required for progress note):       Goal Identifier     Goal Description     Target Date     Date Met      Progress (detail required for progress note):       Goal Identifier     Goal Description      Target Date     Date Met      Progress (detail required for progress note):       Goal Identifier     Goal Description     Target Date     Date Met      Progress (detail required for progress note):       Goal Identifier     Goal Description     Target Date     Date Met      Progress (detail required for progress note):       Goal Identifier     Goal Description     Target Date     Date Met      Progress (detail required for progress note):         Plan:  Discharge from therapy. Pt eval w/o return for services for STARR    Discharge:    Reason for Discharge: Patient has failed to schedule further appointments.    Equipment Issued:     Discharge Plan: Pt to return for services as needed

## 2023-06-21 DIAGNOSIS — F41.9 ANXIETY: ICD-10-CM

## 2023-06-21 DIAGNOSIS — F33.41 RECURRENT MAJOR DEPRESSIVE DISORDER, IN PARTIAL REMISSION (H): Chronic | ICD-10-CM

## 2023-06-21 RX ORDER — ALPRAZOLAM 1 MG
TABLET ORAL
Qty: 60 TABLET | Refills: 0 | Status: SHIPPED | OUTPATIENT
Start: 2023-06-21 | End: 2023-08-18

## 2023-06-21 RX ORDER — BUPROPION HYDROCHLORIDE 300 MG/1
TABLET ORAL
Qty: 90 TABLET | Refills: 3 | Status: SHIPPED | OUTPATIENT
Start: 2023-06-21 | End: 2023-08-25

## 2023-06-21 RX ORDER — BUPROPION HYDROCHLORIDE 150 MG/1
TABLET ORAL
Qty: 90 TABLET | Refills: 3 | OUTPATIENT
Start: 2023-06-21

## 2023-07-18 ENCOUNTER — ANCILLARY PROCEDURE (OUTPATIENT)
Dept: MAMMOGRAPHY | Facility: CLINIC | Age: 63
End: 2023-07-18
Attending: INTERNAL MEDICINE
Payer: COMMERCIAL

## 2023-07-18 ENCOUNTER — ONCOLOGY VISIT (OUTPATIENT)
Dept: ONCOLOGY | Facility: CLINIC | Age: 63
End: 2023-07-18
Attending: INTERNAL MEDICINE
Payer: COMMERCIAL

## 2023-07-18 VITALS
BODY MASS INDEX: 25.54 KG/M2 | SYSTOLIC BLOOD PRESSURE: 106 MMHG | OXYGEN SATURATION: 100 % | TEMPERATURE: 97.7 F | RESPIRATION RATE: 16 BRPM | DIASTOLIC BLOOD PRESSURE: 70 MMHG | HEART RATE: 80 BPM | WEIGHT: 170.5 LBS

## 2023-07-18 DIAGNOSIS — D05.11 DUCTAL CARCINOMA IN SITU (DCIS) OF RIGHT BREAST: Primary | ICD-10-CM

## 2023-07-18 DIAGNOSIS — D05.11 DUCTAL CARCINOMA IN SITU (DCIS) OF RIGHT BREAST: ICD-10-CM

## 2023-07-18 PROCEDURE — 77067 SCR MAMMO BI INCL CAD: CPT | Mod: GC

## 2023-07-18 PROCEDURE — 99213 OFFICE O/P EST LOW 20 MIN: CPT | Performed by: INTERNAL MEDICINE

## 2023-07-18 PROCEDURE — 77063 BREAST TOMOSYNTHESIS BI: CPT | Mod: GC

## 2023-07-18 ASSESSMENT — PAIN SCALES - GENERAL: PAINLEVEL: NO PAIN (0)

## 2023-07-18 NOTE — PROGRESS NOTES
[facility] PHYSICIANS  MEDICAL ONCOLOGY    RETURN PATIENT VISIT NOTE    Reason for visit: DCIS    Oncology Treatment Summary  1. 4/25/99 screening mammogram negative  2. 5/26/2020 mammogram RIGHT UOQ/UIQ with calcifications  3. 5/29/2020 diagnostic mammogram with calcifications over a 20 x 6 x 5 mm area; BX with DCIS G3  4. 6/16/2020 MRI 1.4 cm nodule at bx site  5. 7/8/2020 RIGHT Lumpectomy microscopic DCIS G3 with cribiform changes, Margins negative ER-IA-  6. Adjuvant radiation completed 9/1/2020 to 5265cGy    HISTORY OF PRESENTING ILLNESS  62 yo here for routine followup. She has been well and has not had any ER visits or hospitalizations since last seen. She started using a vaginal estrogen tablet prn She finds this helps with her urinary symptoms.  She has no other new issues.     PAST MEDICAL HISTORY  MVP s/p surgery, HLD, depression      CURRENT OUTPATIENT MEDICATIONS  Current Outpatient Medications   Medication     ALPRAZolam (XANAX) 1 MG tablet     amoxicillin (AMOXIL) 500 MG tablet     aspirin 81 MG tablet     buPROPion (WELLBUTRIN XL) 150 MG 24 hr tablet     buPROPion (WELLBUTRIN XL) 300 MG 24 hr tablet     diphenhydrAMINE-APAP, sleep, (TYLENOL PM EXTRA STRENGTH PO)     ibuprofen (ADVIL/MOTRIN) 200 MG tablet     Ibuprofen-Diphenhydramine Cit (IBUPROFEN PM PO)     melatonin 5 MG CAPS     Multiple Vitamin (DAILY MULTIVITAMIN PO)     rosuvastatin (CRESTOR) 20 MG tablet     No current facility-administered medications for this visit.        ALLERGIES     Allergies   Allergen Reactions     Albumin (Human) Difficulty breathing     Atorvastatin Muscle Pain (Myalgia)     Erythromycin      GI upset, intolerance        SOCIAL HISTORY   to Kel, works at [employer], lives in [place], no tobacco, no etoh, 2 adopted children.     FAMILY HISTORY  MGM breast cancer, PGF unknown cancer     REVIEW OF SYSTEMS  Review Of Systems  Skin: negative  Eyes: negative  Ears/Nose/Throat:  negative  Respiratory: No shortness of breath, dyspnea on exertion, cough, or hemoptysis  Cardiovascular: negative  Gastrointestinal: negative  Genitourinary: negative  Musculoskeletal: negative  Neurologic: negative  Psychiatric: negative  Hematologic/Lymphatic/Immunologic: negative  Endocrine: negative      PHYSICAL EXAM  B/P: 111/74, T: 98.1, P: 61, R: 16  Wt Readings from Last 3 Encounters:   04/03/23 78.1 kg (172 lb 1.6 oz)   09/09/22 77.7 kg (171 lb 3.2 oz)   01/04/22 77.2 kg (170 lb 3.2 oz)       ECOG PPS0    Physical Exam  Vitals reviewed.   Constitutional:       Appearance: Normal appearance. She is normal weight.   HENT:      Head: Normocephalic and atraumatic.   Eyes:      Extraocular Movements: Extraocular movements intact.      Conjunctiva/sclera: Conjunctivae normal.      Pupils: Pupils are equal, round, and reactive to light.   Cardiovascular:      Rate and Rhythm: Normal rate and regular rhythm.   Pulmonary:      Effort: Pulmonary effort is normal.      Breath sounds: Normal breath sounds.   Abdominal:      General: Abdomen is flat.      Palpations: Abdomen is soft.   Musculoskeletal:         General: Normal range of motion.      Cervical back: Normal range of motion and neck supple.   Skin:     General: Skin is warm.   Neurological:      General: No focal deficit present.      Mental Status: She is alert and oriented to person, place, and time. Mental status is at baseline.   Psychiatric:         Mood and Affect: Mood normal.         Behavior: Behavior normal.         Thought Content: Thought content normal.         Judgment: Judgment normal.         Breast exam: left breast no masses no nipple discharge and axilla benign, right breast post surgical changes noted. No masses no nipple discharge.     LABORATORY AND IMAGING STUDIES  Recent Labs   Lab Test 04/03/23  0816 01/04/22  0747 12/10/20  0744 06/29/20  1605 12/02/19  0937    139 139 138 141   POTASSIUM 4.3 4.2 4.5 4.4 4.3   CHLORIDE 108  106 106 106 107   CO2 29 28 28 27 27   ANIONGAP 3 5 5 5 7   BUN 13 9 16 12 15   CR 0.85 0.86 0.95 0.85 0.96   GLC 99 99 89 93 86   JANIS 9.3 8.9 9.1 9.2 9.7     No results for input(s): MAG, PHOS in the last 37828 hours.  Recent Labs   Lab Test 04/03/23  0816 01/04/22  0747 12/10/20  0744 06/29/20  1605 12/02/19  0937   WBC 3.5* 4.7 3.8* 4.4 4.1   HGB 12.2 12.7 12.3 12.3 12.9    285 279 266 302   MCV 89 91 89 88 89   NEUTROPHIL 63  --   --  50.9  --      Recent Labs   Lab Test 04/03/23  0816 01/04/22  0747 12/10/20  0744   BILITOTAL 0.5 0.6 0.4   ALKPHOS 67 63 64   ALT 30 26 31   AST 22 20 22   ALBUMIN 3.8 3.7 3.5     TSH   Date Value Ref Range Status   08/11/2022 4.85 (H) 0.40 - 4.00 mU/L Final   01/04/2022 6.80 (H) 0.40 - 4.00 mU/L Final   12/10/2020 6.14 (H) 0.40 - 4.00 mU/L Final   12/02/2019 5.54 (H) 0.40 - 4.00 mU/L Final   11/08/2018 4.26 (H) 0.40 - 4.00 mU/L Final     No results for input(s): CEA in the last 13197 hours.  Results for orders placed or performed in visit on 07/18/23   MA Screen Bilateral w/Sarkis    Narrative    EXAMINATION:  MA SCREENING BILATERAL W/ SARKIS, 7/18/2023 1:28 PM    HISTORY/FAMILY HISTORY: No current or new breast symptoms, screening.   Ductal carcinoma in situ (DCIS) of right breast     COMPARISON: 7/25/2022, 12/7/2021, 8/2/2021, 7/21/2021, 2/5/2021    TECHNIQUE: Standard mammographic views were performed with  tomosynthesis and synthetic mammogram.     FINDINGS:  BREAST DENSITY: Heterogeneously dense.  There has been no significant interval change or suspicious findings.   Right conservation therapy changes.      Impression    IMPRESSION: BI-RADS CATEGORY: 2 - Benign.    RECOMMENDED FOLLOW-UP: Annual Mammography.    The results of the examination will be sent to the patient.    I have personally reviewed the examination and initial interpretation  and I agree with the findings.    CELESTE BROWER MD         SYSTEM ID:  [id number]          ASSESSMENT  AND  RECOMMENDATIONS:    1. 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 Vaginal dryness     PLAN    1. Mammogram today was unremarkable  2. RTC 1 year with mammogram  3. We discussed her using the vaginal estrogen tablet sparingly.     Elva Cerna MD on 7/18/2023 at 2:47 PM

## 2023-07-18 NOTE — NURSING NOTE
"Oncology Rooming Note    July 18, 2023 1:39 PM   Madison Garcia is a 63 year old female who presents for:    Chief Complaint   Patient presents with     Oncology Clinic Visit     Follow up     Initial Vitals: /70 (BP Location: Left arm, Patient Position: Chair, Cuff Size: Adult Regular)   Pulse 80   Temp 97.7  F (36.5  C) (Oral)   Resp 16   Wt 77.3 kg (170 lb 8 oz)   SpO2 100%   BMI 25.54 kg/m   Estimated body mass index is 25.54 kg/m  as calculated from the following:    Height as of 4/3/23: 1.74 m (5' 8.5\").    Weight as of this encounter: 77.3 kg (170 lb 8 oz). Body surface area is 1.93 meters squared.  No Pain (0) Comment: Data Unavailable   No LMP recorded. Patient is postmenopausal.  Allergies reviewed: Yes  Medications reviewed: Yes    Medications: Medication refills not needed today.  Pharmacy name entered into [facility]:    [id number] [place], [place] - [address] [facility] [address]  [id number] [place], [place] - [address] AT [facility]  WRITTEN PRESCRIPTION REQUESTED  [facility] [place] [address] [place] [address] [id number] - [place], [place] - [address] AT [facility]    Clinical concerns: No       Joanna Jiménez, RN              "

## 2023-07-18 NOTE — LETTER
7/18/2023         RE: Madison Garcia  [address] [address] [place] [id number]        Dear Colleague,    Thank you for referring your patient, Madison Garcia, to the [facility]. Please see a copy of my visit note below.    [facility] PHYSICIANS  MEDICAL ONCOLOGY    RETURN PATIENT VISIT NOTE    Reason for visit: DCIS    Oncology Treatment Summary  1. 4/25/99 screening mammogram negative  2. 5/26/2020 mammogram RIGHT UOQ/UIQ with calcifications  3. 5/29/2020 diagnostic mammogram with calcifications over a 20 x 6 x 5 mm area; BX with DCIS G3  4. 6/16/2020 MRI 1.4 cm nodule at bx site  5. 7/8/2020 RIGHT Lumpectomy microscopic DCIS G3 with cribiform changes, Margins negative ER-ND-  6. Adjuvant radiation completed 9/1/2020 to 5265cGy    HISTORY OF PRESENTING ILLNESS  62 yo here for routine followup. She has been well and has not had any ER visits or hospitalizations since last seen. She started using a vaginal estrogen tablet prn She finds this helps with her urinary symptoms.  She has no other new issues.     PAST MEDICAL HISTORY  MVP s/p surgery, HLD, depression      CURRENT OUTPATIENT MEDICATIONS  Current Outpatient Medications   Medication     ALPRAZolam (XANAX) 1 MG tablet     amoxicillin (AMOXIL) 500 MG tablet     aspirin 81 MG tablet     buPROPion (WELLBUTRIN XL) 150 MG 24 hr tablet     buPROPion (WELLBUTRIN XL) 300 MG 24 hr tablet     diphenhydrAMINE-APAP, sleep, (TYLENOL PM EXTRA STRENGTH PO)     ibuprofen (ADVIL/MOTRIN) 200 MG tablet     Ibuprofen-Diphenhydramine Cit (IBUPROFEN PM PO)     melatonin 5 MG CAPS     Multiple Vitamin (DAILY MULTIVITAMIN PO)     rosuvastatin (CRESTOR) 20 MG tablet     No current facility-administered medications for this visit.        ALLERGIES     Allergies   Allergen Reactions     Albumin (Human) Difficulty breathing     Atorvastatin Muscle Pain (Myalgia)     Erythromycin      GI upset, intolerance        SOCIAL  HISTORY   to Kel, works at [email or web address], lives in [place], no tobacco, no etoh, 2 adopted children.     FAMILY HISTORY  MGM breast cancer, PGF unknown cancer     REVIEW OF SYSTEMS  Review Of Systems  Skin: negative  Eyes: negative  Ears/Nose/Throat: negative  Respiratory: No shortness of breath, dyspnea on exertion, cough, or hemoptysis  Cardiovascular: negative  Gastrointestinal: negative  Genitourinary: negative  Musculoskeletal: negative  Neurologic: negative  Psychiatric: negative  Hematologic/Lymphatic/Immunologic: negative  Endocrine: negative      PHYSICAL EXAM  B/P: 111/74, T: 98.1, P: 61, R: 16  Wt Readings from Last 3 Encounters:   04/03/23 78.1 kg (172 lb 1.6 oz)   09/09/22 77.7 kg (171 lb 3.2 oz)   01/04/22 77.2 kg (170 lb 3.2 oz)       ECOG PPS0    Physical Exam  Vitals reviewed.   Constitutional:       Appearance: Normal appearance. She is normal weight.   HENT:      Head: Normocephalic and atraumatic.   Eyes:      Extraocular Movements: Extraocular movements intact.      Conjunctiva/sclera: Conjunctivae normal.      Pupils: Pupils are equal, round, and reactive to light.   Cardiovascular:      Rate and Rhythm: Normal rate and regular rhythm.   Pulmonary:      Effort: Pulmonary effort is normal.      Breath sounds: Normal breath sounds.   Abdominal:      General: Abdomen is flat.      Palpations: Abdomen is soft.   Musculoskeletal:         General: Normal range of motion.      Cervical back: Normal range of motion and neck supple.   Skin:     General: Skin is warm.   Neurological:      General: No focal deficit present.      Mental Status: She is alert and oriented to person, place, and time. Mental status is at baseline.   Psychiatric:         Mood and Affect: Mood normal.         Behavior: Behavior normal.         Thought Content: Thought content normal.         Judgment: Judgment normal.         Breast exam: left breast no masses no nipple discharge and axilla benign, right breast  post surgical changes noted. No masses no nipple discharge.     LABORATORY AND IMAGING STUDIES  Recent Labs   Lab Test 04/03/23  0816 01/04/22  0747 12/10/20  0744 06/29/20  1605 12/02/19  0937    139 139 138 141   POTASSIUM 4.3 4.2 4.5 4.4 4.3   CHLORIDE 108 106 106 106 107   CO2 29 28 28 27 27   ANIONGAP 3 5 5 5 7   BUN 13 9 16 12 15   CR 0.85 0.86 0.95 0.85 0.96   GLC 99 99 89 93 86   JANIS 9.3 8.9 9.1 9.2 9.7     No results for input(s): MAG, PHOS in the last 58111 hours.  Recent Labs   Lab Test 04/03/23  0816 01/04/22  0747 12/10/20  0744 06/29/20  1605 12/02/19  0937   WBC 3.5* 4.7 3.8* 4.4 4.1   HGB 12.2 12.7 12.3 12.3 12.9    285 279 266 302   MCV 89 91 89 88 89   NEUTROPHIL 63  --   --  50.9  --      Recent Labs   Lab Test 04/03/23  0816 01/04/22  0747 12/10/20  0744   BILITOTAL 0.5 0.6 0.4   ALKPHOS 67 63 64   ALT 30 26 31   AST 22 20 22   ALBUMIN 3.8 3.7 3.5     TSH   Date Value Ref Range Status   08/11/2022 4.85 (H) 0.40 - 4.00 mU/L Final   01/04/2022 6.80 (H) 0.40 - 4.00 mU/L Final   12/10/2020 6.14 (H) 0.40 - 4.00 mU/L Final   12/02/2019 5.54 (H) 0.40 - 4.00 mU/L Final   11/08/2018 4.26 (H) 0.40 - 4.00 mU/L Final     No results for input(s): CEA in the last 72903 hours.  Results for orders placed or performed in visit on 07/18/23   MA Screen Bilateral w/Sarkis    Narrative    EXAMINATION:  MA SCREENING BILATERAL W/ SARKIS, 7/18/2023 1:28 PM    HISTORY/FAMILY HISTORY: No current or new breast symptoms, screening.   Ductal carcinoma in situ (DCIS) of right breast     COMPARISON: 7/25/2022, 12/7/2021, 8/2/2021, 7/21/2021, 2/5/2021    TECHNIQUE: Standard mammographic views were performed with  tomosynthesis and synthetic mammogram.     FINDINGS:  BREAST DENSITY: Heterogeneously dense.  There has been no significant interval change or suspicious findings.   Right conservation therapy changes.      Impression    IMPRESSION: BI-RADS CATEGORY: 2 - Benign.    RECOMMENDED FOLLOW-UP: Annual  Mammography.    The results of the examination will be sent to the patient.    I have personally reviewed the examination and initial interpretation  and I agree with the findings.    CELESTE BROWER MD         SYSTEM ID:  [id number]          ASSESSMENT  AND RECOMMENDATIONS:    1. 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 Vaginal dryness     PLAN    1. Mammogram today was unremarkable  2. RTC 1 year with mammogram  3. We discussed her using the vaginal estrogen tablet sparingly.     Elva Cerna MD on 7/18/2023 at 2:47 PM                 Again, thank you for allowing me to participate in the care of your patient.        Sincerely,        Elva Cerna MD

## 2023-08-18 DIAGNOSIS — F41.9 ANXIETY: ICD-10-CM

## 2023-08-18 RX ORDER — ALPRAZOLAM 1 MG
TABLET ORAL
Qty: 30 TABLET | Refills: 0 | Status: SHIPPED | OUTPATIENT
Start: 2023-08-18 | End: 2023-08-25

## 2023-08-18 NOTE — TELEPHONE ENCOUNTER
Pt called requesting Alprazolam refill. [facility] has sent Rx request but has not received response. Triage informed pt  we have no record of Rx request. She wants to  Rx before Tuesday next week. Pt has schedule future medication follow up visit with PCP 08/25.    Routing refill request to provider for review/approval because:  Drug not on the [facility] refill protocol

## 2023-08-25 ENCOUNTER — VIRTUAL VISIT (OUTPATIENT)
Dept: FAMILY MEDICINE | Facility: CLINIC | Age: 63
End: 2023-08-25
Payer: COMMERCIAL

## 2023-08-25 DIAGNOSIS — I25.83 CORONARY ARTERY DISEASE DUE TO LIPID RICH PLAQUE: ICD-10-CM

## 2023-08-25 DIAGNOSIS — Z17.1 MALIGNANT NEOPLASM OF UPPER-INNER QUADRANT OF RIGHT BREAST IN FEMALE, ESTROGEN RECEPTOR NEGATIVE (H): ICD-10-CM

## 2023-08-25 DIAGNOSIS — F33.41 RECURRENT MAJOR DEPRESSIVE DISORDER, IN PARTIAL REMISSION (H): Chronic | ICD-10-CM

## 2023-08-25 DIAGNOSIS — F41.9 ANXIETY: ICD-10-CM

## 2023-08-25 DIAGNOSIS — I25.10 CORONARY ARTERY DISEASE DUE TO LIPID RICH PLAQUE: ICD-10-CM

## 2023-08-25 DIAGNOSIS — C50.211 MALIGNANT NEOPLASM OF UPPER-INNER QUADRANT OF RIGHT BREAST IN FEMALE, ESTROGEN RECEPTOR NEGATIVE (H): ICD-10-CM

## 2023-08-25 PROCEDURE — 99207 PR NO CHARGE LOS: CPT | Mod: 93 | Performed by: INTERNAL MEDICINE

## 2023-08-25 RX ORDER — ROSUVASTATIN CALCIUM 20 MG/1
20 TABLET, COATED ORAL DAILY
Qty: 90 TABLET | Refills: 3 | Status: SHIPPED | OUTPATIENT
Start: 2023-08-25 | End: 2024-04-16

## 2023-08-25 RX ORDER — BUPROPION HYDROCHLORIDE 300 MG/1
TABLET ORAL
Qty: 90 TABLET | Refills: 3 | Status: SHIPPED | OUTPATIENT
Start: 2023-08-25 | End: 2024-04-16

## 2023-08-25 RX ORDER — ALPRAZOLAM 1 MG
TABLET ORAL
Qty: 30 TABLET | Refills: 0 | Status: SHIPPED | OUTPATIENT
Start: 2023-08-25 | End: 2023-10-20

## 2023-08-25 RX ORDER — BUPROPION HYDROCHLORIDE 150 MG/1
TABLET ORAL
Qty: 90 TABLET | Refills: 3 | Status: SHIPPED | OUTPATIENT
Start: 2023-08-25 | End: 2024-04-16

## 2023-08-25 NOTE — PROGRESS NOTES
Madison is a 63 year old who is being evaluated via a billable telephone visit.      What phone number would you like to be contacted at? [phone]    How would you like to obtain your AVS? Anne    Distant Location (provider location):  On-site        Subjective   Madison is a 63 year old, presenting for the following health issues:  No chief complaint on file.    I checked the pdmp today.  It looks appropriate.    I called patient.  She is doing well.  She had to have her physical in April with minor but plans to do it next year with me.  She is on Wellbutrin which controls her anxiety and depression.  For sleep she has been on Xanax for years and it works well.  With that she does not have side effects and the PDMP was checked.  She otherwise is feeling well.  She does use melatonin as well as Advil PM at night and I suggested that the combination well okay now especially as she ages can increase her risk of falls and confusion as well as memory issues.  We did discuss cutting down slowly on this and she will try to do this.  She otherwise feels fine.  She is up-to-date with her gynecologist as well as labs.    ASSESSMENT:  Depression and anxiety, doing well at this point  Insomnia, chronic, no signs of med abuse  Coronary artery disease, medical management, on statin therapy  Breast cancer, no evidence of disease, follows up with oncology    PLAN:  Renewed meds  Follow up yearly or prn    Tera Keith M.D.

## 2023-10-20 DIAGNOSIS — F41.9 ANXIETY: ICD-10-CM

## 2023-10-20 RX ORDER — ALPRAZOLAM 1 MG
TABLET ORAL
Qty: 30 TABLET | Refills: 0 | Status: SHIPPED | OUTPATIENT
Start: 2023-10-20 | End: 2023-11-20

## 2023-11-02 ENCOUNTER — TRANSFERRED RECORDS (OUTPATIENT)
Dept: HEALTH INFORMATION MANAGEMENT | Facility: CLINIC | Age: 63
End: 2023-11-02

## 2023-11-20 ENCOUNTER — TRANSFERRED RECORDS (OUTPATIENT)
Dept: HEALTH INFORMATION MANAGEMENT | Facility: CLINIC | Age: 63
End: 2023-11-20
Payer: COMMERCIAL

## 2023-11-20 DIAGNOSIS — F41.9 ANXIETY: ICD-10-CM

## 2023-11-20 RX ORDER — ALPRAZOLAM 1 MG
TABLET ORAL
Qty: 30 TABLET | Refills: 0 | Status: SHIPPED | OUTPATIENT
Start: 2023-11-20 | End: 2023-12-18

## 2023-12-18 ENCOUNTER — TELEPHONE (OUTPATIENT)
Dept: FAMILY MEDICINE | Facility: CLINIC | Age: 63
End: 2023-12-18
Payer: COMMERCIAL

## 2023-12-18 DIAGNOSIS — F41.9 ANXIETY: ICD-10-CM

## 2023-12-18 RX ORDER — ALPRAZOLAM 1 MG
TABLET ORAL
Qty: 30 TABLET | Refills: 0 | Status: SHIPPED | OUTPATIENT
Start: 2023-12-18 | End: 2024-01-18

## 2023-12-18 NOTE — TELEPHONE ENCOUNTER
Patient calling stating [facility] sent a text saying they did not have her medication. Reviewed chart. Patients rx for xanax was sent to pharmacy this morning with confirmation from them.     Writer advised patient to call pharmacy. Patient is in agreement.

## 2024-01-18 DIAGNOSIS — F41.9 ANXIETY: ICD-10-CM

## 2024-01-18 RX ORDER — ALPRAZOLAM 1 MG
TABLET ORAL
Qty: 30 TABLET | Refills: 0 | Status: SHIPPED | OUTPATIENT
Start: 2024-01-18 | End: 2024-02-19

## 2024-02-19 DIAGNOSIS — F41.9 ANXIETY: ICD-10-CM

## 2024-02-19 RX ORDER — ALPRAZOLAM 1 MG
TABLET ORAL
Qty: 30 TABLET | Refills: 0 | Status: SHIPPED | OUTPATIENT
Start: 2024-02-19 | End: 2024-03-18

## 2024-04-12 SDOH — HEALTH STABILITY: PHYSICAL HEALTH: ON AVERAGE, HOW MANY MINUTES DO YOU ENGAGE IN EXERCISE AT THIS LEVEL?: 20 MIN

## 2024-04-12 SDOH — HEALTH STABILITY: PHYSICAL HEALTH: ON AVERAGE, HOW MANY DAYS PER WEEK DO YOU ENGAGE IN MODERATE TO STRENUOUS EXERCISE (LIKE A BRISK WALK)?: 2 DAYS

## 2024-04-12 ASSESSMENT — SOCIAL DETERMINANTS OF HEALTH (SDOH): HOW OFTEN DO YOU GET TOGETHER WITH FRIENDS OR RELATIVES?: ONCE A WEEK

## 2024-04-15 ASSESSMENT — PATIENT HEALTH QUESTIONNAIRE - PHQ9
10. IF YOU CHECKED OFF ANY PROBLEMS, HOW DIFFICULT HAVE THESE PROBLEMS MADE IT FOR YOU TO DO YOUR WORK, TAKE CARE OF THINGS AT HOME, OR GET ALONG WITH OTHER PEOPLE: SOMEWHAT DIFFICULT
SUM OF ALL RESPONSES TO PHQ QUESTIONS 1-9: 3
SUM OF ALL RESPONSES TO PHQ QUESTIONS 1-9: 3

## 2024-04-16 ENCOUNTER — OFFICE VISIT (OUTPATIENT)
Dept: FAMILY MEDICINE | Facility: CLINIC | Age: 64
End: 2024-04-16
Payer: COMMERCIAL

## 2024-04-16 VITALS
WEIGHT: 174 LBS | DIASTOLIC BLOOD PRESSURE: 73 MMHG | TEMPERATURE: 96.8 F | BODY MASS INDEX: 26.37 KG/M2 | HEART RATE: 60 BPM | OXYGEN SATURATION: 96 % | HEIGHT: 68 IN | RESPIRATION RATE: 16 BRPM | SYSTOLIC BLOOD PRESSURE: 110 MMHG

## 2024-04-16 DIAGNOSIS — I25.83 CORONARY ARTERY DISEASE DUE TO LIPID RICH PLAQUE: ICD-10-CM

## 2024-04-16 DIAGNOSIS — Z17.1 MALIGNANT NEOPLASM OF UPPER-INNER QUADRANT OF RIGHT BREAST IN FEMALE, ESTROGEN RECEPTOR NEGATIVE (H): ICD-10-CM

## 2024-04-16 DIAGNOSIS — G47.00 INSOMNIA, UNSPECIFIED TYPE: Chronic | ICD-10-CM

## 2024-04-16 DIAGNOSIS — R79.89 ELEVATED TSH: ICD-10-CM

## 2024-04-16 DIAGNOSIS — Z00.00 ENCOUNTER FOR ANNUAL PHYSICAL EXAM: Primary | ICD-10-CM

## 2024-04-16 DIAGNOSIS — I25.10 ASCVD (ARTERIOSCLEROTIC CARDIOVASCULAR DISEASE): Chronic | ICD-10-CM

## 2024-04-16 DIAGNOSIS — K63.5 POLYP OF COLON, UNSPECIFIED PART OF COLON, UNSPECIFIED TYPE: ICD-10-CM

## 2024-04-16 DIAGNOSIS — C50.211 MALIGNANT NEOPLASM OF UPPER-INNER QUADRANT OF RIGHT BREAST IN FEMALE, ESTROGEN RECEPTOR NEGATIVE (H): ICD-10-CM

## 2024-04-16 DIAGNOSIS — E78.5 HYPERLIPIDEMIA LDL GOAL <100: Chronic | ICD-10-CM

## 2024-04-16 DIAGNOSIS — F33.41 RECURRENT MAJOR DEPRESSIVE DISORDER, IN PARTIAL REMISSION (H): ICD-10-CM

## 2024-04-16 DIAGNOSIS — D05.11 DUCTAL CARCINOMA IN SITU (DCIS) OF RIGHT BREAST: ICD-10-CM

## 2024-04-16 DIAGNOSIS — I25.10 CORONARY ARTERY DISEASE DUE TO LIPID RICH PLAQUE: ICD-10-CM

## 2024-04-16 DIAGNOSIS — R07.89 CHEST TIGHTNESS: ICD-10-CM

## 2024-04-16 LAB
ERYTHROCYTE [DISTWIDTH] IN BLOOD BY AUTOMATED COUNT: 11.8 % (ref 10–15)
HCT VFR BLD AUTO: 38.9 % (ref 35–47)
HGB BLD-MCNC: 12.8 G/DL (ref 11.7–15.7)
MCH RBC QN AUTO: 29 PG (ref 26.5–33)
MCHC RBC AUTO-ENTMCNC: 32.9 G/DL (ref 31.5–36.5)
MCV RBC AUTO: 88 FL (ref 78–100)
PLATELET # BLD AUTO: 286 10E3/UL (ref 150–450)
RBC # BLD AUTO: 4.41 10E6/UL (ref 3.8–5.2)
WBC # BLD AUTO: 4.6 10E3/UL (ref 4–11)

## 2024-04-16 PROCEDURE — 99396 PREV VISIT EST AGE 40-64: CPT | Performed by: INTERNAL MEDICINE

## 2024-04-16 PROCEDURE — 99213 OFFICE O/P EST LOW 20 MIN: CPT | Mod: 25 | Performed by: INTERNAL MEDICINE

## 2024-04-16 PROCEDURE — 80061 LIPID PANEL: CPT | Performed by: INTERNAL MEDICINE

## 2024-04-16 PROCEDURE — 36415 COLL VENOUS BLD VENIPUNCTURE: CPT | Performed by: INTERNAL MEDICINE

## 2024-04-16 PROCEDURE — 85027 COMPLETE CBC AUTOMATED: CPT | Performed by: INTERNAL MEDICINE

## 2024-04-16 PROCEDURE — 80053 COMPREHEN METABOLIC PANEL: CPT | Performed by: INTERNAL MEDICINE

## 2024-04-16 PROCEDURE — 84443 ASSAY THYROID STIM HORMONE: CPT | Performed by: INTERNAL MEDICINE

## 2024-04-16 PROCEDURE — 84439 ASSAY OF FREE THYROXINE: CPT | Performed by: INTERNAL MEDICINE

## 2024-04-16 RX ORDER — BUPROPION HYDROCHLORIDE 300 MG/1
TABLET ORAL
Qty: 90 TABLET | Refills: 3 | Status: SHIPPED | OUTPATIENT
Start: 2024-04-16

## 2024-04-16 RX ORDER — ROSUVASTATIN CALCIUM 20 MG/1
20 TABLET, COATED ORAL DAILY
Qty: 90 TABLET | Refills: 3 | Status: SHIPPED | OUTPATIENT
Start: 2024-04-16

## 2024-04-16 RX ORDER — BUPROPION HYDROCHLORIDE 150 MG/1
TABLET ORAL
Qty: 90 TABLET | Refills: 3 | Status: SHIPPED | OUTPATIENT
Start: 2024-04-16

## 2024-04-16 ASSESSMENT — PAIN SCALES - GENERAL: PAINLEVEL: NO PAIN (0)

## 2024-04-16 NOTE — LETTER
April 17, 2024      Madison Garcia  [address] [address] [place] [id number]        Dear ,    We are writing to inform you of your test results.    It was a pleasure seeing you for your physical examination.  I wanted to get back to you with your test results.  I have enclosed a copy for your review.     I am happy to report that your cbc or complete blood count is normal with no signs of anemia, leukemia or platelet abnormalities. Your chemistry panel shows no signs of diabetes.  Your blood salts, kidney tests, liver tests, and proteins are all fine.     Your total cholesterol is 153 with the normal range being below 200.  Your HDL or good cholesterol is 45 with the normal range being above 50.  Your LDL or bad cholesterol is 82 with the normal range being below 130.  Overall these numbers are very good but not as good as last year.  Please be sure to exercise and eat a healthy diet.     Your tsh or thyroid test is just barely off but the other thyroid test is normal so nothing needs to be done.     I am happy to bring you this overall excellent report.  If you have any questions please call me.     Tera Keith M.D.     Resulted Orders   CBC with platelets   Result Value Ref Range    WBC Count 4.6 4.0 - 11.0 10e3/uL    RBC Count 4.41 3.80 - 5.20 10e6/uL    Hemoglobin 12.8 11.7 - 15.7 g/dL    Hematocrit 38.9 35.0 - 47.0 %    MCV 88 78 - 100 fL    MCH 29.0 26.5 - 33.0 pg    MCHC 32.9 31.5 - 36.5 g/dL    RDW 11.8 10.0 - 15.0 %    Platelet Count 286 150 - 450 10e3/uL   Comprehensive metabolic panel   Result Value Ref Range    Sodium 143 135 - 145 mmol/L      Comment:      Reference intervals for this test were updated on 09/26/2023 to more accurately reflect our healthy population. There may be differences in the flagging of prior results with similar values performed with this method. Interpretation of those prior results can be made in the context of the updated reference intervals.     Potassium  4.3 3.4 - 5.3 mmol/L    Carbon Dioxide (CO2) 27 22 - 29 mmol/L    Anion Gap 11 7 - 15 mmol/L    Urea Nitrogen 14.7 8.0 - 23.0 mg/dL    Creatinine 0.92 0.51 - 0.95 mg/dL    GFR Estimate 70 >60 mL/min/1.73m2    Calcium 9.6 8.8 - 10.2 mg/dL    Chloride 105 98 - 107 mmol/L    Glucose 86 70 - 99 mg/dL    Alkaline Phosphatase 65 40 - 150 U/L      Comment:      Reference intervals for this test were updated on 11/14/2023 to more accurately reflect our healthy population. There may be differences in the flagging of prior results with similar values performed with this method. Interpretation of those prior results can be made in the context of the updated reference intervals.    AST 27 0 - 45 U/L      Comment:      Reference intervals for this test were updated on 6/12/2023 to more accurately reflect our healthy population. There may be differences in the flagging of prior results with similar values performed with this method. Interpretation of those prior results can be made in the context of the updated reference intervals.    ALT 20 0 - 50 U/L      Comment:      Reference intervals for this test were updated on 6/12/2023 to more accurately reflect our healthy population. There may be differences in the flagging of prior results with similar values performed with this method. Interpretation of those prior results can be made in the context of the updated reference intervals.      Protein Total 7.4 6.4 - 8.3 g/dL    Albumin 4.3 3.5 - 5.2 g/dL    Bilirubin Total 0.4 <=1.2 mg/dL   Lipid panel reflex to direct LDL Fasting   Result Value Ref Range    Cholesterol 153 <200 mg/dL    Triglycerides 132 <150 mg/dL    Direct Measure HDL 45 (L) >=50 mg/dL    LDL Cholesterol Calculated 82 <=100 mg/dL    Non HDL Cholesterol 108 <130 mg/dL    Patient Fasting > 8hrs? Yes     Narrative    Cholesterol  Desirable:  <200 mg/dL    Triglycerides  Normal:  Less than 150 mg/dL  Borderline High:  150-199 mg/dL  High:  200-499 mg/dL  Very High:   Greater than or equal to 500 mg/dL    Direct Measure HDL  Female:  Greater than or equal to 50 mg/dL   Male:  Greater than or equal to 40 mg/dL    LDL Cholesterol  Desirable:  <100mg/dL  Above Desirable:  100-129 mg/dL   Borderline High:  130-159 mg/dL   High:  160-189 mg/dL   Very High:  >= 190 mg/dL    Non HDL Cholesterol  Desirable:  130 mg/dL  Above Desirable:  130-159 mg/dL  Borderline High:  160-189 mg/dL  High:  190-219 mg/dL  Very High:  Greater than or equal to 220 mg/dL   TSH with free T4 reflex   Result Value Ref Range    TSH 5.79 (H) 0.30 - 4.20 uIU/mL   T4 free   Result Value Ref Range    Free T4 1.12 0.90 - 1.70 ng/dL       If you have any questions or concerns, please call the clinic at the number listed above.       Sincerely,      Tera Keith MD

## 2024-04-16 NOTE — PROGRESS NOTES
Preventive Care Visit  [facility] SURESH Keith MD, Internal Medicine  Apr 16, 2024          Alejandra Wallace is a 64 year old, presenting for the following:    Overall she is doing well and her depression and anxiety are fairly well-controlled.  She has chronic insomnia and uses over-the-counter med as well as Xanax for that.  I checked the PDMP and there is no signs of abuse.    She does have chest tightness which she has had off-and-on for a number of years.  It can be every few months.  Is not necessarily exertional.  She is not really exercising due to knee issues.    She is up-to-date with oncology as well as gynecology.  She is up-to-date on her colon exam.  She otherwise feels fine.               Past Medical History:      Past Medical History:   Diagnosis Date    Abdominal pain 2008 and 2010    ct neg 2008, ovar us 2010 with fallopian cyst and fibroids    Anxiety and depression 90's    on xanax hs for years    ASCVD (arteriosclerotic cardiovascular disease) 12/2012    pos est, ct angio and cards fu, 25-49% lad    Chest tightness 12/2019    neg est echo    Chronic cystitis     Dr. Mcgrath    Chronic insomnia     for years, seen in sleep clinic 2016    Colonic polyp fu nl 2008    fu due 2013, fu done 2014 and nl, to fu 2019, fu nl 12/20    [place] (dyspnea on exertion)     est echo nl 2015, ct chest and pulm eval by Dr. Quiroga and no cause found    Ductal carcinoma in situ (DCIS) of right breast 2020    lumpectomy and xrt    Elevated TSH 11/2017    Hyperlipidaemia     on crestor for years    Leg pain 2014    stopped lipitor    Lung nodules 01/2012    seen on coronary calcium ct, fu 1 year, fu done 12/12 and likely benign, fu done 2016 and no change, Dr. Quiroga    MVP (mitral valve prolapse) 2013    Had it surgically repaired, Robotic, done [place] 6/13 seen by cardiology 2011, echo 2007 with mild mr and nl lv fxn, fu echo 12/11 with lv size upper limits of nl, nl ef, no wma, mild lae, mvp and  mild mr present    Palpitations     Screening 01/2012    cor ct score 0             Past Surgical History:      Past Surgical History:   Procedure Laterality Date    BIOPSY BREAST      COLONOSCOPY  12/20    COLONOSCOPY WITH CO2 INSUFFLATION N/A 12/30/2020    Procedure: COLONOSCOPY, WITH CO2 INSUFFLATION;  Surgeon: Tera Luu MD;  Location: [facility] OR    HERNIA REPAIR      LUMPECTOMY BREAST      LUMPECTOMY BREAST WITH SEED LOCALIZATION Right 7/8/2020    Procedure: SEED LOCALIZED RIGHT BREAST LUMPECTOMY;  Surgeon: Elise Slater MD;  Location: [facility] OR    REPAIR VALVE MITRAL  6/13    done at [facility]             Social History:     Social History     Socioeconomic History    Marital status:      Spouse name: Not on file    Number of children: 2    Years of education: Not on file    Highest education level: Not on file   Occupational History    Occupation:      Employer: [employer]    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   Tobacco Use    Smoking status: Never    Smokeless tobacco: Never   Vaping Use    Vaping status: Never Used   Substance and Sexual Activity    Alcohol use: Yes     Alcohol/week: 0.0 standard drinks of alcohol     Comment: 1-2/week     Drug use: No    Sexual activity: Yes     Partners: Male     Birth control/protection: Condom   Other Topics Concern     Service Not Asked    Blood Transfusions Not Asked    Caffeine Concern Yes     Comment: 2 cans of pop per day    Occupational Exposure Not Asked    Hobby Hazards Not Asked    Sleep Concern Not Asked    Stress Concern Not Asked    Weight Concern Not Asked    Special Diet Yes     Comment: vegetarian    Back Care Not Asked    Exercise Yes     Comment: 3x per week     Bike Helmet Not Asked    Seat Belt Not Asked    Self-Exams Not Asked    Parent/sibling w/ CABG, MI or angioplasty before 65F 55M? No   Social History Narrative    Not on file     Social Determinants of Health     Financial Resource Strain: Low Risk   (4/12/2024)    Financial Resource Strain     Within the past 12 months, have you or your family members you live with been unable to get utilities (heat, electricity) when it was really needed?: No   Food Insecurity: Low Risk  (4/12/2024)    Food Insecurity     Within the past 12 months, did you worry that your food would run out before you got money to buy more?: No     Within the past 12 months, did the food you bought just not last and you didn t have money to get more?: No   Transportation Needs: Low Risk  (4/12/2024)    Transportation Needs     Within the past 12 months, has lack of transportation kept you from medical appointments, getting your medicines, non-medical meetings or appointments, work, or from getting things that you need?: No   Physical Activity: Insufficiently Active (4/12/2024)    Exercise Vital Sign     Days of Exercise per Week: 2 days     Minutes of Exercise per Session: 20 min   Stress: Stress Concern Present (4/12/2024)    [employer] - Occupational Stress Questionnaire     Feeling of Stress : To some extent   Social Connections: Unknown (4/12/2024)    Social Connection and Isolation Panel [NHANES]     Frequency of Communication with Friends and Family: Not on file     Frequency of Social Gatherings with Friends and Family: Once a week     Attends Cheondo[employer] Services: Not on file     Active Member of Clubs or Organizations: Not on file     Attends Club or Organization Meetings: Not on file     Marital Status: Not on file   Interpersonal Safety: Not on file   Housing Stability: Low Risk  (4/12/2024)    Housing Stability     Do you have housing? : Yes     Are you worried about losing your housing?: No             Family History:   reviewed         Allergies:     Allergies   Allergen Reactions    Albumin (Human) Difficulty breathing    Atorvastatin Muscle Pain (Myalgia)    Erythromycin      GI upset, intolerance             Medications:     Current Outpatient  "Medications   Medication Sig Dispense Refill    ALPRAZolam (XANAX) 1 MG tablet TAKE 1 TABLET BY MOUTH EVERY DAY AT BEDTIME AS NEEDED FOR ANXIETY 90 tablet 0    amoxicillin (AMOXIL) 500 MG tablet Take 2,000 mg by mouth daily as needed (prior to dental appointments)      aspirin 81 MG tablet Take 1 tablet by mouth daily. 1 tablet 3    buPROPion (WELLBUTRIN XL) 150 MG 24 hr tablet TAKE 1 TABLET BY MOUTH DAILY ALONG WITH 300MG TABLET FOR TOTAL DAILY DOSE OF 450MG 90 tablet 3    buPROPion (WELLBUTRIN XL) 300 MG 24 hr tablet TAKE 1 TABLET BY MOUTH DAILY WITH 150MG TABLET 90 tablet 3    Ibuprofen-Diphenhydramine Cit (IBUPROFEN PM PO) Take 2 tablets by mouth every evening       melatonin 5 MG CAPS Take 6-7.5 mg by mouth At Bedtime       Multiple Vitamin (DAILY MULTIVITAMIN PO) Take 1 tablet by mouth daily.      rosuvastatin (CRESTOR) 20 MG tablet Take 1 tablet (20 mg) by mouth daily 90 tablet 3               Review of Systems:     The 10 point Review of Systems is negative other than noted in the HPI           Physical Exam:   Blood pressure 110/73, pulse 60, temperature 96.8  F (36  C), temperature source Temporal, resp. rate 16, height 1.734 m (5' 8.27\"), weight 78.9 kg (174 lb), SpO2 96%, not currently breastfeeding.    Exam:  Constitutional: healthy appearing, alert and in no distress  Heent: Normocephalic. Head without obvious masses or lesions. PERRLDC, EOMI. Mouth exam within normal limits: tongue, mucous membranes, posterior pharynx all normal, no lesions or abnormalities seen.  Tm's and canals within normal limits bilaterally. Neck supple, no nuchal rigidity or masses. No supraclavicular, or cervical adenopathy. Thyroid symmetric, no masses.  Cardiovascular: Regular rate and rhythm, no murmer, rub or gallops.  JVP not elevated, no edema.  Carotids within normal limits bilaterally, no bruits.  Respiratory: Normal respiratory effort.  Lungs clear, normal flow, no wheezing or crackles.  Gastrointestinal: Normal active " bowel sounds.   Soft, not tender, no masses, guarding or rebound.  No hepatosplenomegaly.   Musculoskeletal: extremities normal, no gross deformities noted.  Skin: no suspicious lesions or rashes   Neurologic: Mental status within normal limits.  Speech fluent.  No gross motor abnormalities and gait intact.  Psychiatric: mentation appears normal and affect normal.         Data:   Labs sent        Assessment:   Normal complete physical exam  Chest tightness, I doubt this is cardiovascular but given her known cardiovascular disease we will proceed with stress test.  Depression and anxiety, controlled  Breast cancer, no evidence of disease  Elevated TSH, labs today  Insomnia, using Xanax and over-the-counter meds.  I recommended trying less than  And she will see if that works  Hyperlipidemia, on statin therapy  Coronary artery disease, med management  Colon polyp, up-to-date on follow-up  Healthcare maintenance         Plan:   COVID booster and Shingrix at pharmacy  Up-to-date mammogram and colon exam  Stress echo  Letter with labs  Exercise and diet      Tera Keith M.D.

## 2024-04-17 LAB
ALBUMIN SERPL BCG-MCNC: 4.3 G/DL (ref 3.5–5.2)
ALP SERPL-CCNC: 65 U/L (ref 40–150)
ALT SERPL W P-5'-P-CCNC: 20 U/L (ref 0–50)
ANION GAP SERPL CALCULATED.3IONS-SCNC: 11 MMOL/L (ref 7–15)
AST SERPL W P-5'-P-CCNC: 27 U/L (ref 0–45)
BILIRUB SERPL-MCNC: 0.4 MG/DL
BUN SERPL-MCNC: 14.7 MG/DL (ref 8–23)
CALCIUM SERPL-MCNC: 9.6 MG/DL (ref 8.8–10.2)
CHLORIDE SERPL-SCNC: 105 MMOL/L (ref 98–107)
CHOLEST SERPL-MCNC: 153 MG/DL
CREAT SERPL-MCNC: 0.92 MG/DL (ref 0.51–0.95)
DEPRECATED HCO3 PLAS-SCNC: 27 MMOL/L (ref 22–29)
EGFRCR SERPLBLD CKD-EPI 2021: 70 ML/MIN/1.73M2
FASTING STATUS PATIENT QL REPORTED: YES
GLUCOSE SERPL-MCNC: 86 MG/DL (ref 70–99)
HDLC SERPL-MCNC: 45 MG/DL
LDLC SERPL CALC-MCNC: 82 MG/DL
NONHDLC SERPL-MCNC: 108 MG/DL
POTASSIUM SERPL-SCNC: 4.3 MMOL/L (ref 3.4–5.3)
PROT SERPL-MCNC: 7.4 G/DL (ref 6.4–8.3)
SODIUM SERPL-SCNC: 143 MMOL/L (ref 135–145)
T4 FREE SERPL-MCNC: 1.12 NG/DL (ref 0.9–1.7)
TRIGL SERPL-MCNC: 132 MG/DL
TSH SERPL DL<=0.005 MIU/L-ACNC: 5.79 UIU/ML (ref 0.3–4.2)

## 2024-04-17 NOTE — RESULT ENCOUNTER NOTE
It was a pleasure seeing you for your physical examination.  I wanted to get back to you with your test results.  I have enclosed a copy for your review.     I am happy to report that your cbc or complete blood count is normal with no signs of anemia, leukemia or platelet abnormalities. Your chemistry panel shows no signs of diabetes.  Your blood salts, kidney tests, liver tests, and proteins are all fine.    Your total cholesterol is 153 with the normal range being below 200.  Your HDL or good cholesterol is 45 with the normal range being above 50.  Your LDL or bad cholesterol is 82 with the normal range being below 130.  Overall these numbers are very good but not as good as last year.  Please be sure to exercise and eat a healthy diet.    Your tsh or thyroid test is just barely off but the other thyroid test is normal so nothing needs to be done.    I am happy to bring you this overall excellent report.  If you have any questions please call me.    Tera Keith M.D.

## 2024-06-17 DIAGNOSIS — F41.9 ANXIETY: ICD-10-CM

## 2024-06-17 RX ORDER — ALPRAZOLAM 1 MG
TABLET ORAL
Qty: 90 TABLET | Refills: 0 | Status: SHIPPED | OUTPATIENT
Start: 2024-06-17 | End: 2024-09-15

## 2024-07-22 ENCOUNTER — ANCILLARY PROCEDURE (OUTPATIENT)
Dept: MAMMOGRAPHY | Facility: CLINIC | Age: 64
End: 2024-07-22
Attending: INTERNAL MEDICINE
Payer: COMMERCIAL

## 2024-07-22 DIAGNOSIS — D05.11 DUCTAL CARCINOMA IN SITU (DCIS) OF RIGHT BREAST: ICD-10-CM

## 2024-07-22 PROCEDURE — 77063 BREAST TOMOSYNTHESIS BI: CPT | Performed by: RADIOLOGY

## 2024-07-22 PROCEDURE — 77067 SCR MAMMO BI INCL CAD: CPT | Performed by: RADIOLOGY

## 2024-07-23 ENCOUNTER — ONCOLOGY VISIT (OUTPATIENT)
Dept: ONCOLOGY | Facility: CLINIC | Age: 64
End: 2024-07-23
Attending: NURSE PRACTITIONER
Payer: COMMERCIAL

## 2024-07-23 VITALS
WEIGHT: 174.4 LBS | HEART RATE: 76 BPM | HEIGHT: 68 IN | DIASTOLIC BLOOD PRESSURE: 76 MMHG | BODY MASS INDEX: 26.43 KG/M2 | OXYGEN SATURATION: 100 % | SYSTOLIC BLOOD PRESSURE: 115 MMHG

## 2024-07-23 DIAGNOSIS — D05.11 DUCTAL CARCINOMA IN SITU (DCIS) OF RIGHT BREAST: Primary | ICD-10-CM

## 2024-07-23 DIAGNOSIS — I34.1 MVP (MITRAL VALVE PROLAPSE): Chronic | ICD-10-CM

## 2024-07-23 PROCEDURE — 99213 OFFICE O/P EST LOW 20 MIN: CPT | Performed by: NURSE PRACTITIONER

## 2024-07-23 PROCEDURE — 99203 OFFICE O/P NEW LOW 30 MIN: CPT | Performed by: NURSE PRACTITIONER

## 2024-07-23 ASSESSMENT — PAIN SCALES - GENERAL: PAINLEVEL: NO PAIN (0)

## 2024-07-23 NOTE — LETTER
7/23/2024      Madison Garcia  [address] [address] [place] [id number]      Dear Colleague,    Thank you for referring your patient, Madison Garcia, to the [facility]. Please see a copy of my visit note below.    Oncology Follow Up Visit: July 23, 2024    Oncologist: Dr Elva Cerna  PCP: Tera Keith    Diagnosis: Right DCIS  Madison Garcia is a 65 yo female.   4/25/99 screening mammogram negative  5/26/2020 mammogram RIGHT UOQ/UIQ with calcifications  5/29/2020 diagnostic mammogram with calcifications over a 20 x 6 x 5 mm area; BX with DCIS G3  6/16/2020 MRI 1.4 cm nodule at bx site  Treatment:   7/8/2020 RIGHT Lumpectomy microscopic DCIS G3 with cribiform changes, Margins negative ER-AK-  9/1/2020 Adjuvant radiation completed -5265cGy    Interval History: Ms Garcia comes to clinic alone for follow-up to her DCIS of her right breast.  Patient shares she is feeling well and has no new breast or chest issues today.  Asking questions about follow-up to a dense breast.  Reports that she is due for some follow-up to mitral valve replacement with cardiology in the near future but has no current symptoms..  Rest of comprehensive and complete ROS is reviewed and is negative.   Past Medical History:   Diagnosis Date     Abdominal pain 2008 and 2010    ct neg 2008, ovar us 2010 with fallopian cyst and fibroids     Anxiety and depression 90's    on xanax hs for years     ASCVD (arteriosclerotic cardiovascular disease) 12/2012    pos est, ct angio and cards fu, 25-49% lad     Breast cancer ([facility])      Chest tightness 12/2019    neg est echo     Chronic cystitis     Dr. Mcgrath     Chronic insomnia     for years, seen in sleep clinic 2016     Colonic polyp fu nl 2008    fu due 2013, fu done 2014 and nl, to fu 2019, fu nl 12/20     [place] (dyspnea on exertion)     est echo nl 2015, ct chest and pulm eval by Dr. Quiroga and no cause found     Ductal carcinoma in situ (DCIS) of right  "breast 2020    lumpectomy and xrt     Elevated TSH 11/2017     Hyperlipidaemia     on crestor for years     Leg pain 2014    stopped lipitor     Lung nodules 01/2012    seen on coronary calcium ct, fu 1 year, fu done 12/12 and likely benign, fu done 2016 and no change, Dr. Quiroga     MVP (mitral valve prolapse) 2013    Had it surgically repaired, Robotic, done [place] 6/13 seen by cardiology 2011, echo 2007 with mild mr and nl lv fxn, fu echo 12/11 with lv size upper limits of nl, nl ef, no wma, mild lae, mvp and mild mr present     Palpitations      Screening 01/2012    cor ct score 0     Current Outpatient Medications   Medication Sig Dispense Refill     ALPRAZolam (XANAX) 1 MG tablet TAKE 1 TABLET BY MOUTH EVERY DAY AT BEDTIME AS NEEDED FOR ANXIETY 90 tablet 0     amoxicillin (AMOXIL) 500 MG tablet Take 2,000 mg by mouth daily as needed (prior to dental appointments)       aspirin 81 MG tablet Take 1 tablet by mouth daily. 1 tablet 3     buPROPion (WELLBUTRIN XL) 150 MG 24 hr tablet TAKE 1 TABLET BY MOUTH DAILY ALONG WITH 300MG TABLET FOR TOTAL DAILY DOSE OF 450MG 90 tablet 3     buPROPion (WELLBUTRIN XL) 300 MG 24 hr tablet TAKE 1 TABLET BY MOUTH DAILY WITH 150MG TABLET 90 tablet 3     Ibuprofen-Diphenhydramine Cit (IBUPROFEN PM PO) Take 2 tablets by mouth every evening        melatonin 5 MG CAPS Take 6-7.5 mg by mouth At Bedtime        Multiple Vitamin (DAILY MULTIVITAMIN PO) Take 1 tablet by mouth daily.       rosuvastatin (CRESTOR) 20 MG tablet Take 1 tablet (20 mg) by mouth daily 90 tablet 3     No current facility-administered medications for this visit.     Allergies   Allergen Reactions     Albumin (Human) Difficulty breathing     Atorvastatin Muscle Pain (Myalgia)     Erythromycin      GI upset, intolerance   Mgm- Breast cancer.     Physical Exam:/76 (BP Location: Right arm)   Pulse 76   Ht 1.734 m (5' 8.27\")   Wt 79.1 kg (174 lb 6.4 oz)   SpO2 100%   BMI 26.31 kg/m     Constitutional: Alert, " healthy, and in no distress.   ENT: Eyes bright, No mouth sores  Neck: Supple, No adenopathy.  Cardiac: Heart rate and rhythm is regular and strong without murmur  Respiratory: Breathing easy. Lung sounds clear to auscultation  Breasts: Left breast is without any tenderness discharge or changes.  Right breast has 2 different scars 1 from the DCIS and 1 from previous heart surgery that are both tender to touch but have no new masses discharge or expanding scar tissues.  GI: Abdomen is soft, non-tender, BS normal. No masses or organomegaly  MS: Muscle tone normal, extremities normal with no edema.  Patient does have some tightness or cording to the right arm post surgeries but does have fairly good range of motion to above her head.  Skin: No suspicious lesions or rashes  Neuro: Sensory grossly WNL, gait normal.   Lymph: Normal ant/post cervical, axillary, supraclavicular nodes  Psych: Mentation appears normal and affect normal/bright with good conversation    Laboratory/Imaging Results:   BILATERAL FULL FIELD DIGITAL SCREENING MAMMOGRAM WITH TOMOSYNTHESIS     Performed on: 7/22/24     Compared to: 07/18/2023, 07/25/2022, and 07/21/2021, 4/24/2018     Technique:  This study was evaluated with the assistance of Computer-Aided Detection.  Breast Tomosynthesis was used in interpretation.     Findings: The breasts are heterogeneously dense, which may obscure small masses.  There are breast conservation changes in the right breast.  There is no radiographic evidence of malignancy.                                                                    IMPRESSION: ACR BI-RADS Category 2: Benign     BREAST CANCER SCREENING RECOMMENDATION: Routine yearly mammography beginning at age 40 or as discussed with your provider.     The results and recommendations of this examination will be communicated to the patient.      Tiana Stephens MD  Assessment and Plan:   Right breast DCIS-patient had lumpectomy with radiation but disease was ER/TN  negative and so is not on tamoxifen at this time.  She has been in observation for the last 4 years and we did discuss that her goal is to get her to 5 years without recurrence of disease.  Since there is no new family history to add increased risk her goal is to get her to the 5-year gavin.  Mammogram yesterday shows only benign concerns.  She will return in 1 year with mammogram and review with Dr. Cerna- no labs needed.    History of mitral valve prolapse with valve replacement-patient states she is due to see cardiology reminder that she should keep up with this choice.  No current cardiac concerns.    Health maintenance-last colonoscopy was in 2020-repeat in 10 years.  Should see PCP or gynecology for follow-up to abdominal bloating or gas with her annual visit.    The total time of this encounter amounted to 30 minutes. This time included face-to-face time spent with the patient, prep work, ordering tests, and performing post visit documentation.  Elissa Prado Cnp      Again, thank you for allowing me to participate in the care of your patient.        Sincerely,        Elissa Prado, GINNY, APRN CNP

## 2024-07-23 NOTE — PROGRESS NOTES
Oncology Follow Up Visit: July 23, 2024    Oncologist: Dr Elva Cerna  PCP: Tera Keith    Diagnosis: Right DCIS  Madison Garcia is a 63 yo female.   4/25/99 screening mammogram negative  5/26/2020 mammogram RIGHT UOQ/UIQ with calcifications  5/29/2020 diagnostic mammogram with calcifications over a 20 x 6 x 5 mm area; BX with DCIS G3  6/16/2020 MRI 1.4 cm nodule at bx site  Treatment:   7/8/2020 RIGHT Lumpectomy microscopic DCIS G3 with cribiform changes, Margins negative ER-NH-  9/1/2020 Adjuvant radiation completed -5265cGy    Interval History: Ms Garcia comes to clinic alone for follow-up to her DCIS of her right breast.  Patient shares she is feeling well and has no new breast or chest issues today.  Asking questions about follow-up to a dense breast.  Reports that she is due for some follow-up to mitral valve replacement with cardiology in the near future but has no current symptoms..  Rest of comprehensive and complete ROS is reviewed and is negative.   Past Medical History:   Diagnosis Date    Abdominal pain 2008 and 2010    ct neg 2008, ovar us 2010 with fallopian cyst and fibroids    Anxiety and depression 90's    on xanax hs for years    ASCVD (arteriosclerotic cardiovascular disease) 12/2012    pos est, ct angio and cards fu, 25-49% lad    Breast cancer (H)     Chest tightness 12/2019    neg est echo    Chronic cystitis     Dr. Mcgrath    Chronic insomnia     for years, seen in sleep clinic 2016    Colonic polyp fu nl 2008    fu due 2013, fu done 2014 and nl, to fu 2019, fu nl 12/20    [place] (dyspnea on exertion)     est echo nl 2015, ct chest and pulm eval by Dr. Quiroga and no cause found    Ductal carcinoma in situ (DCIS) of right breast 2020    lumpectomy and xrt    Elevated TSH 11/2017    Hyperlipidaemia     on crestor for years    Leg pain 2014    stopped lipitor    Lung nodules 01/2012    seen on coronary calcium ct, fu 1 year, fu done 12/12 and likely benign, fu done 2016 and no change,  "Dr. Quiroga    MVP (mitral valve prolapse) 2013    Had it surgically repaired, Robotic, done [place] 6/13 seen by cardiology 2011, echo 2007 with mild mr and nl lv fxn, fu echo 12/11 with lv size upper limits of nl, nl ef, no wma, mild lae, mvp and mild mr present    Palpitations     Screening 01/2012    cor ct score 0     Current Outpatient Medications   Medication Sig Dispense Refill    ALPRAZolam (XANAX) 1 MG tablet TAKE 1 TABLET BY MOUTH EVERY DAY AT BEDTIME AS NEEDED FOR ANXIETY 90 tablet 0    amoxicillin (AMOXIL) 500 MG tablet Take 2,000 mg by mouth daily as needed (prior to dental appointments)      aspirin 81 MG tablet Take 1 tablet by mouth daily. 1 tablet 3    buPROPion (WELLBUTRIN XL) 150 MG 24 hr tablet TAKE 1 TABLET BY MOUTH DAILY ALONG WITH 300MG TABLET FOR TOTAL DAILY DOSE OF 450MG 90 tablet 3    buPROPion (WELLBUTRIN XL) 300 MG 24 hr tablet TAKE 1 TABLET BY MOUTH DAILY WITH 150MG TABLET 90 tablet 3    Ibuprofen-Diphenhydramine Cit (IBUPROFEN PM PO) Take 2 tablets by mouth every evening       melatonin 5 MG CAPS Take 6-7.5 mg by mouth At Bedtime       Multiple Vitamin (DAILY MULTIVITAMIN PO) Take 1 tablet by mouth daily.      rosuvastatin (CRESTOR) 20 MG tablet Take 1 tablet (20 mg) by mouth daily 90 tablet 3     No current facility-administered medications for this visit.     Allergies   Allergen Reactions    Albumin (Human) Difficulty breathing    Atorvastatin Muscle Pain (Myalgia)    Erythromycin      GI upset, intolerance   Mgm- Breast cancer.     Physical Exam:/76 (BP Location: Right arm)   Pulse 76   Ht 1.734 m (5' 8.27\")   Wt 79.1 kg (174 lb 6.4 oz)   SpO2 100%   BMI 26.31 kg/m     Constitutional: Alert, healthy, and in no distress.   ENT: Eyes bright, No mouth sores  Neck: Supple, No adenopathy.  Cardiac: Heart rate and rhythm is regular and strong without murmur  Respiratory: Breathing easy. Lung sounds clear to auscultation  Breasts: Left breast is without any tenderness discharge or " changes.  Right breast has 2 different scars 1 from the DCIS and 1 from previous heart surgery that are both tender to touch but have no new masses discharge or expanding scar tissues.  GI: Abdomen is soft, non-tender, BS normal. No masses or organomegaly  MS: Muscle tone normal, extremities normal with no edema.  Patient does have some tightness or cording to the right arm post surgeries but does have fairly good range of motion to above her head.  Skin: No suspicious lesions or rashes  Neuro: Sensory grossly WNL, gait normal.   Lymph: Normal ant/post cervical, axillary, supraclavicular nodes  Psych: Mentation appears normal and affect normal/bright with good conversation    Laboratory/Imaging Results:   BILATERAL FULL FIELD DIGITAL SCREENING MAMMOGRAM WITH TOMOSYNTHESIS     Performed on: 7/22/24     Compared to: 07/18/2023, 07/25/2022, and 07/21/2021, 4/24/2018     Technique:  This study was evaluated with the assistance of Computer-Aided Detection.  Breast Tomosynthesis was used in interpretation.     Findings: The breasts are heterogeneously dense, which may obscure small masses.  There are breast conservation changes in the right breast.  There is no radiographic evidence of malignancy.                                                                    IMPRESSION: ACR BI-RADS Category 2: Benign     BREAST CANCER SCREENING RECOMMENDATION: Routine yearly mammography beginning at age 40 or as discussed with your provider.     The results and recommendations of this examination will be communicated to the patient.      An MD Angelo  Assessment and Plan:   Right breast DCIS-patient had lumpectomy with radiation but disease was ER/CA negative and so is not on tamoxifen at this time.  She has been in observation for the last 4 years and we did discuss that her goal is to get her to 5 years without recurrence of disease.  Since there is no new family history to add increased risk her goal is to get her to the 5-year  gavin.  Mammogram yesterday shows only benign concerns.  She will return in 1 year with mammogram and review with Dr. Cerna- no labs needed.    History of mitral valve prolapse with valve replacement-patient states she is due to see cardiology reminder that she should keep up with this choice.  No current cardiac concerns.    Health maintenance-last colonoscopy was in 2020-repeat in 10 years.  Should see PCP or gynecology for follow-up to abdominal bloating or gas with her annual visit.    The total time of this encounter amounted to 30 minutes. This time included face-to-face time spent with the patient, prep work, ordering tests, and performing post visit documentation.  Elissa Prado,Cnp

## 2024-09-05 ENCOUNTER — MYC MEDICAL ADVICE (OUTPATIENT)
Dept: FAMILY MEDICINE | Facility: CLINIC | Age: 64
End: 2024-09-05
Payer: COMMERCIAL

## 2024-09-06 ENCOUNTER — NURSE TRIAGE (OUTPATIENT)
Dept: FAMILY MEDICINE | Facility: CLINIC | Age: 64
End: 2024-09-06

## 2024-09-06 ENCOUNTER — VIRTUAL VISIT (OUTPATIENT)
Dept: FAMILY MEDICINE | Facility: CLINIC | Age: 64
End: 2024-09-06
Payer: COMMERCIAL

## 2024-09-06 DIAGNOSIS — U07.1 COVID-19 VIRUS INFECTION: Primary | ICD-10-CM

## 2024-09-06 PROCEDURE — 99441 PR PHYSICIAN TELEPHONE EVALUATION 5-10 MIN: CPT | Mod: 93 | Performed by: PHYSICIAN ASSISTANT

## 2024-09-06 NOTE — PROGRESS NOTES
Madison is a 64 year old who is being evaluated via a billable telephone visit.      Originating Location (pt. Location): Home    Distant Location (provider location):  On-site    Assessment and Plan:     (U07.1) COVID-19 virus infection  (primary encounter diagnosis)  Comment: onset 3-4 days ago, mild symptoms, no chest pain or sob, has been vaccinated and boosted, would like paxlovid  Plan: nirmatrelvir and ritonavir (PAXLOVID) 300         mg/100 mg therapy pack        Discussed paxlovid MOA, dosing, possible SE and drug interactions  Cut xanax in half while on it  Stop crestor x 8 days  No etoh while on palxovid  To ED if any yellowing of eyes or skin or severe covid symptoms which were discussed today    [facility] Same Day Provider             Subjective   Madison is a 64 year old, presenting for the following health issues:  No chief complaint on file.    HPI     Madison is seeing me via phone visit for covid  Symptoms started 3-4 days ago  She has had sore throat, headache, cough, fever, bodyaches  She denies sob, chest pain, leg swelling           Objective           Vitals:  No vitals were obtained today due to virtual visit.    Physical Exam   General: Alert and no distress //Respiratory: No audible wheeze, cough, or shortness of breath // Psychiatric:  Appropriate affect, tone, and pace of words        Phone call duration: 9 minutes  Signed Electronically by: Trixie Zazueta PA-C

## 2024-09-06 NOTE — TELEPHONE ENCOUNTER
RN COVID TREATMENT VISIT  09/06/24      The patient has been triaged and does not require a higher level of care.    Madison Garcia  64 year old  Current weight? 170lbs    Has the patient been seen by a primary care provider at an [facility] or [facility] within the past two years? Yes.   Have you been in close proximity to/do you have a known exposure to a person with a confirmed case of influenza? No.     General treatment eligibility:  Date of positive COVID test (PCR or at home)?  9/5/24    Are you or have you been hospitalized for this COVID-19 infection? No.   Have you received monoclonal antibodies or antiviral treatment for COVID-19 since this positive test? No.   Do you have any of the following conditions that place you at risk of being very sick from COVID-19?   - Age 50 years or older  Yes, patient has at least one high risk condition as noted above.     Current COVID symptoms:   - fever or chills  - cough  - fatigue  - headache  - sore throat  - congestion or runny nose  Yes. Patient has at least one symptom as selected.     How many days since symptoms started? 5 days or less. Established patient, 12 years or older weighing at least 88.2 lbs, who has symptoms that started in the past 5 days, has not been hospitalized nor received treatment already, and is at risk for being very sick from COVID-19.     Treatment eligibility by RN:  Are you currently pregnant or nursing? No  Do you have a clinically significant hypersensitivity to nirmatrelvir or ritonavir, or toxic epidermal necrolysis (TEN) or Ellis-Rafael Syndrome? No  Do you have a history of hepatitis, any hepatic impairment on the Problem List (such as Child-Orr Class C, cirrhosis, fatty liver disease, alcoholic liver disease), or was the last liver lab (hepatic panel, ALT, AST, ALK Phos, bilirubin) elevated in the past 6 months? No  Do you have any history of severe renal impairment (eGFR < 30mL/min)? No    Is  patient eligible to continue? Yes, patient meets all eligibility requirements for the RN COVID treatment (as denoted by all no responses above).     Current Outpatient Medications   Medication Sig Dispense Refill    ALPRAZolam (XANAX) 1 MG tablet TAKE 1 TABLET BY MOUTH EVERY DAY AT BEDTIME AS NEEDED FOR ANXIETY 90 tablet 0    amoxicillin (AMOXIL) 500 MG tablet Take 2,000 mg by mouth daily as needed (prior to dental appointments)      aspirin 81 MG tablet Take 1 tablet by mouth daily. 1 tablet 3    buPROPion (WELLBUTRIN XL) 150 MG 24 hr tablet TAKE 1 TABLET BY MOUTH DAILY ALONG WITH 300MG TABLET FOR TOTAL DAILY DOSE OF 450MG 90 tablet 3    buPROPion (WELLBUTRIN XL) 300 MG 24 hr tablet TAKE 1 TABLET BY MOUTH DAILY WITH 150MG TABLET 90 tablet 3    Ibuprofen-Diphenhydramine Cit (IBUPROFEN PM PO) Take 2 tablets by mouth every evening       melatonin 5 MG CAPS Take 6-7.5 mg by mouth At Bedtime       Multiple Vitamin (DAILY MULTIVITAMIN PO) Take 1 tablet by mouth daily.      rosuvastatin (CRESTOR) 20 MG tablet Take 1 tablet (20 mg) by mouth daily 90 tablet 3       Medications from List 1 of the standing order (on medications that exclude the use of Paxlovid) that patient is taking: NONE. Is patient taking Emeka's Wort? No  Is patient taking Emeka's Wort or any meds from List 1? No.   Medications from List 2 of the standing order (on meds that provider needs to adjust) that patient is taking: NONE. Is patient on any of the meds from List 2? No.   Medications from List 3 of standing order (on meds that a RN needs to adjust) that patient is taking: alprazolam (Xanax): Is patient taking as needed and less than daily? No, patient is taking daily or does not feel comfortable stopping medication and instructed patient to see a provider for COVID treatment options.They will be transferred to a  at the end of this call  bupropion (Wellbutrin, Zyban): Instructed patient to continue taking buproprion as directed but  know that it may have less effect while taking Paxlovid.   rosuvastatin (Crestor): Instructed patient to stop rosuvastatin while taking Paxlovid and restart rosuvastatin 1 day after the completion of Paxlovid.  Is patient on any meds from List 3? Yes. Patient is on at least one med that needs a provider visit and will be scheduled or transferred to a  at the end of this call.   Davie Mccann, RN         Reason for Disposition   HIGH RISK patient (e.g., weak immune system, age > 64 years, obesity with BMI of 30 or higher, pregnant, chronic lung disease or other chronic medical condition) and COVID symptoms (e.g., cough, fever)  (Exceptions: Already seen by doctor or NP/PA and no new or worsening symptoms.)    Additional Information   Negative: SEVERE difficulty breathing (e.g., struggling for each breath, speaks in single words)   Negative: Difficult to awaken or acting confused (e.g., disoriented, slurred speech)   Negative: Bluish (or gray) lips or face now   Negative: Shock suspected (e.g., cold/pale/clammy skin, too weak to stand, low BP, rapid pulse)   Negative: Sounds like a life-threatening emergency to the triager   Negative: Diagnosed or suspected COVID-19 and symptoms lasting 3 or more weeks   Negative: COVID-19 exposure and no symptoms   Negative: COVID-19 vaccine reaction suspected (e.g., fever, headache, muscle aches) occurring 1 to 3 days after getting vaccine   Negative: COVID-19 vaccine, questions about   Negative: Lives with someone known to have influenza (flu test positive) and flu-like symptoms (e.g., cough, runny nose, sore throat, SOB; with or without fever)   Negative: Possible COVID-19 symptoms and triager concerned about severity of symptoms or other causes   Negative: COVID-19 and breastfeeding, questions about   Negative: SEVERE or constant chest pain or pressure  (Exception: Mild central chest pain, present only when coughing.)   Negative: MODERATE difficulty breathing (e.g.,  "speaks in phrases, SOB even at rest, pulse 100-120)   Negative: Headache and stiff neck (can't touch chin to chest)   Negative: Oxygen level (e.g., pulse oximetry) 90% or lower   Negative: Chest pain or pressure  (Exception: MILD central chest pain, present only when coughing.)   Negative: Drinking very little and dehydration suspected (e.g., no urine > 12 hours, very dry mouth, very lightheaded)   Negative: Patient sounds very sick or weak to the triager   Negative: MILD difficulty breathing (e.g., minimal/no SOB at rest, SOB with walking, pulse <100)   Negative: Fever > 103 F (39.4 C)   Negative: Fever > 101 F (38.3 C) and over 60 years of age   Negative: Fever > 100.0 F (37.8 C) and bedridden (e.g., CVA, chronic illness, recovering from surgery)    Answer Assessment - Initial Assessment Questions  1. COVID-19 DIAGNOSIS: \"How do you know that you have COVID?\" (e.g., positive lab test or self-test, diagnosed by doctor or NP/PA, symptoms after exposure).      Home test 9/5  3. ONSET: \"When did the COVID-19 symptoms start?\"       9/3/24  4. WORST SYMPTOM: \"What is your worst symptom?\" (e.g., cough, fever, shortness of breath, muscle aches)     Body aches, coughing  5. COUGH: \"Do you have a cough?\" If Yes, ask: \"How bad is the cough?\"        dry  6. FEVER: \"Do you have a fever?\" If Yes, ask: \"What is your temperature, how was it measured, and when did it start?\"      102F  7. RESPIRATORY STATUS: \"Describe your breathing?\" (e.g., normal; shortness of breath, wheezing, unable to speak)       Denies  8. BETTER-SAME-WORSE: \"Are you getting better, staying the same or getting worse compared to yesterday?\"  If getting worse, ask, \"In what way?\"      Esclated  9. OTHER SYMPTOMS: \"Do you have any other symptoms?\"  (e.g., chills, fatigue, headache, loss of smell or taste, muscle pain, sore throat)      Fatigue, muscle aches, sore throat, headache  10. HIGH RISK DISEASE: \"Do you have any chronic medical problems?\" (e.g., " "asthma, heart or lung disease, weak immune system, obesity, etc.)        ASCVD  11. VACCINE: \"Have you had the COVID-19 vaccine?\" If Yes, ask: \"Which one, how many shots, when did you get it?\"        Yes, 2 or 3    Protocols used: Coronavirus (COVID-19) Diagnosed or [id number]    "

## 2024-09-06 NOTE — PATIENT INSTRUCTIONS
Stop rosuvastatin x 8 days     Cut xanax in half at bedtime while on paxlovid     The FDA has authorized the emergency use of certain medications for patients who are at high risk for progression to severe illness or death from COVID 19. These medications are investigational, and therefore, information is limited as they are still being studied.        COVID-19 Outpatient Treatments    Important: You can NOT be prescribed Molnupiravir or PAXLOVID if you will start taking the medicine more than 5 days after your symptoms have started.      PAXLOVID: https://www.fda.gov/media/433202/download      Please isolate according to CDC guidelines:  https://www.cdc.gov/coronavirus/2019-ncov/your-health/quarantine-isolation.html      PAXLOVID (nimatrelvir/ritonavir)  How it works  Two medicines (nirmatrelvir and ritonavir) are taken together. They stop the virus from growing. Less amount of virus is easier for your body to fight.  Benefits  Lowers risk of hospitalization and death from COVID-19.  How to take  Medicine comes in a daily container with both medicine tablets. Take by mouth twice daily (once in the morning, once at night) for 5 days.  The number of tablets to take varies by patient.  Don't chew or break capsules. Swallow hole.  When to take  Take as soon as possible after positive COVID-19 test result, and within 5 days of your first symptoms.  Who can take it  Patients must be 18 years or older, weigh at least 88 pounds and have tested positive for COVID-19.  Possible side effects  Can cause altered sense of taste, diarhea (loose, watery stools), high blood pressure, muscle aches.  Medication interactions  Several medicines may interact with PAXLOVID and may cause serious side effects.  Tell your care team about all the medicines you take, including prescription and over-the-counter medicines, vitamins and herbal supplements.  Your provider will review your medicines to make sure that you can safely take  PAXLOVID.  Cautions  PAXLOVID is not recommended for patients with severe kidney or liver disease. If you have mild kidney disease, the dose may need to be changed.  If you are pregnant or breastfeeding, talk to your care team about your options.  If you are sexually active, use effective birth control while taking PAXLOVID.          For informational purposes only. Not to replace the advice of your health care provider. Copyright   2022 [facility]. All rights reserved. Clinically reviewed by Zulma Mullen. [employer] 186773 - 01/22.

## 2024-09-14 DIAGNOSIS — F41.9 ANXIETY: ICD-10-CM

## 2024-09-15 RX ORDER — ALPRAZOLAM 1 MG
TABLET ORAL
Qty: 90 TABLET | Refills: 0 | Status: SHIPPED | OUTPATIENT
Start: 2024-09-15

## 2024-12-14 DIAGNOSIS — F41.9 ANXIETY: ICD-10-CM

## 2024-12-15 RX ORDER — ALPRAZOLAM 1 MG/1
TABLET ORAL
Qty: 90 TABLET | Refills: 0 | Status: SHIPPED | OUTPATIENT
Start: 2024-12-15

## 2025-03-17 DIAGNOSIS — F41.9 ANXIETY: ICD-10-CM

## 2025-03-17 RX ORDER — ALPRAZOLAM 1 MG/1
TABLET ORAL
Qty: 90 TABLET | Refills: 0 | Status: SHIPPED | OUTPATIENT
Start: 2025-03-17

## 2025-04-15 DIAGNOSIS — F33.41 RECURRENT MAJOR DEPRESSIVE DISORDER, IN PARTIAL REMISSION: ICD-10-CM

## 2025-04-15 RX ORDER — BUPROPION HYDROCHLORIDE 300 MG/1
TABLET ORAL
Qty: 90 TABLET | Refills: 3 | Status: SHIPPED | OUTPATIENT
Start: 2025-04-15

## 2025-04-16 DIAGNOSIS — I25.10 CORONARY ARTERY DISEASE DUE TO LIPID RICH PLAQUE: ICD-10-CM

## 2025-04-16 DIAGNOSIS — I25.83 CORONARY ARTERY DISEASE DUE TO LIPID RICH PLAQUE: ICD-10-CM

## 2025-04-16 RX ORDER — ROSUVASTATIN CALCIUM 20 MG/1
20 TABLET, COATED ORAL DAILY
Qty: 90 TABLET | Refills: 3 | Status: SHIPPED | OUTPATIENT
Start: 2025-04-16

## 2025-04-19 SDOH — HEALTH STABILITY: PHYSICAL HEALTH: ON AVERAGE, HOW MANY DAYS PER WEEK DO YOU ENGAGE IN MODERATE TO STRENUOUS EXERCISE (LIKE A BRISK WALK)?: 1 DAY

## 2025-04-19 SDOH — HEALTH STABILITY: PHYSICAL HEALTH: ON AVERAGE, HOW MANY MINUTES DO YOU ENGAGE IN EXERCISE AT THIS LEVEL?: 20 MIN

## 2025-04-19 ASSESSMENT — SOCIAL DETERMINANTS OF HEALTH (SDOH): HOW OFTEN DO YOU GET TOGETHER WITH FRIENDS OR RELATIVES?: ONCE A WEEK

## 2025-04-22 ENCOUNTER — OFFICE VISIT (OUTPATIENT)
Dept: FAMILY MEDICINE | Facility: CLINIC | Age: 65
End: 2025-04-22
Payer: COMMERCIAL

## 2025-04-22 VITALS
OXYGEN SATURATION: 98 % | HEART RATE: 72 BPM | HEIGHT: 68 IN | RESPIRATION RATE: 16 BRPM | WEIGHT: 172 LBS | BODY MASS INDEX: 26.07 KG/M2 | DIASTOLIC BLOOD PRESSURE: 70 MMHG | TEMPERATURE: 97.1 F | SYSTOLIC BLOOD PRESSURE: 123 MMHG

## 2025-04-22 DIAGNOSIS — I25.83 CORONARY ARTERY DISEASE DUE TO LIPID RICH PLAQUE: ICD-10-CM

## 2025-04-22 DIAGNOSIS — R79.89 ELEVATED TSH: ICD-10-CM

## 2025-04-22 DIAGNOSIS — Z00.00 ENCOUNTER FOR ANNUAL PHYSICAL EXAM: Primary | ICD-10-CM

## 2025-04-22 DIAGNOSIS — G47.00 INSOMNIA, UNSPECIFIED TYPE: Chronic | ICD-10-CM

## 2025-04-22 DIAGNOSIS — F41.9 ANXIETY: ICD-10-CM

## 2025-04-22 DIAGNOSIS — Z17.1 MALIGNANT NEOPLASM OF UPPER-INNER QUADRANT OF RIGHT BREAST IN FEMALE, ESTROGEN RECEPTOR NEGATIVE (H): ICD-10-CM

## 2025-04-22 DIAGNOSIS — I25.10 CORONARY ARTERY DISEASE DUE TO LIPID RICH PLAQUE: ICD-10-CM

## 2025-04-22 DIAGNOSIS — R07.89 CHEST TIGHTNESS: ICD-10-CM

## 2025-04-22 DIAGNOSIS — F33.41 RECURRENT MAJOR DEPRESSIVE DISORDER, IN PARTIAL REMISSION: Chronic | ICD-10-CM

## 2025-04-22 DIAGNOSIS — C50.211 MALIGNANT NEOPLASM OF UPPER-INNER QUADRANT OF RIGHT BREAST IN FEMALE, ESTROGEN RECEPTOR NEGATIVE (H): ICD-10-CM

## 2025-04-22 DIAGNOSIS — E78.5 HYPERLIPIDEMIA LDL GOAL <100: Chronic | ICD-10-CM

## 2025-04-22 DIAGNOSIS — K63.5 POLYP OF COLON, UNSPECIFIED PART OF COLON, UNSPECIFIED TYPE: ICD-10-CM

## 2025-04-22 DIAGNOSIS — I25.10 ASCVD (ARTERIOSCLEROTIC CARDIOVASCULAR DISEASE): Chronic | ICD-10-CM

## 2025-04-22 LAB
ALBUMIN SERPL BCG-MCNC: 4.1 G/DL (ref 3.5–5.2)
ALP SERPL-CCNC: 67 U/L (ref 40–150)
ALT SERPL W P-5'-P-CCNC: 23 U/L (ref 0–50)
ANION GAP SERPL CALCULATED.3IONS-SCNC: 10 MMOL/L (ref 7–15)
AST SERPL W P-5'-P-CCNC: 26 U/L (ref 0–45)
BILIRUB SERPL-MCNC: 0.4 MG/DL
BUN SERPL-MCNC: 11 MG/DL (ref 8–23)
CALCIUM SERPL-MCNC: 9.2 MG/DL (ref 8.8–10.4)
CHLORIDE SERPL-SCNC: 105 MMOL/L (ref 98–107)
CHOLEST SERPL-MCNC: 153 MG/DL
CREAT SERPL-MCNC: 0.99 MG/DL (ref 0.51–0.95)
EGFRCR SERPLBLD CKD-EPI 2021: 64 ML/MIN/1.73M2
ERYTHROCYTE [DISTWIDTH] IN BLOOD BY AUTOMATED COUNT: 12.4 % (ref 10–15)
FASTING STATUS PATIENT QL REPORTED: YES
FASTING STATUS PATIENT QL REPORTED: YES
GLUCOSE SERPL-MCNC: 89 MG/DL (ref 70–99)
HCO3 SERPL-SCNC: 26 MMOL/L (ref 22–29)
HCT VFR BLD AUTO: 38.3 % (ref 35–47)
HDLC SERPL-MCNC: 53 MG/DL
HGB BLD-MCNC: 12.2 G/DL (ref 11.7–15.7)
LDLC SERPL CALC-MCNC: 80 MG/DL
MCH RBC QN AUTO: 28 PG (ref 26.5–33)
MCHC RBC AUTO-ENTMCNC: 31.9 G/DL (ref 31.5–36.5)
MCV RBC AUTO: 88 FL (ref 78–100)
NONHDLC SERPL-MCNC: 100 MG/DL
PLATELET # BLD AUTO: 311 10E3/UL (ref 150–450)
POTASSIUM SERPL-SCNC: 4.4 MMOL/L (ref 3.4–5.3)
PROT SERPL-MCNC: 6.7 G/DL (ref 6.4–8.3)
RBC # BLD AUTO: 4.36 10E6/UL (ref 3.8–5.2)
SODIUM SERPL-SCNC: 141 MMOL/L (ref 135–145)
T4 FREE SERPL-MCNC: 1.08 NG/DL (ref 0.9–1.7)
TRIGL SERPL-MCNC: 101 MG/DL
TSH SERPL DL<=0.005 MIU/L-ACNC: 6.38 UIU/ML (ref 0.3–4.2)
WBC # BLD AUTO: 4.4 10E3/UL (ref 4–11)

## 2025-04-22 PROCEDURE — 96127 BRIEF EMOTIONAL/BEHAV ASSMT: CPT | Performed by: INTERNAL MEDICINE

## 2025-04-22 PROCEDURE — 99214 OFFICE O/P EST MOD 30 MIN: CPT | Mod: 25 | Performed by: INTERNAL MEDICINE

## 2025-04-22 PROCEDURE — G2211 COMPLEX E/M VISIT ADD ON: HCPCS | Performed by: INTERNAL MEDICINE

## 2025-04-22 PROCEDURE — 1126F AMNT PAIN NOTED NONE PRSNT: CPT | Performed by: INTERNAL MEDICINE

## 2025-04-22 PROCEDURE — 80061 LIPID PANEL: CPT | Performed by: INTERNAL MEDICINE

## 2025-04-22 PROCEDURE — 80053 COMPREHEN METABOLIC PANEL: CPT | Performed by: INTERNAL MEDICINE

## 2025-04-22 PROCEDURE — 3078F DIAST BP <80 MM HG: CPT | Performed by: INTERNAL MEDICINE

## 2025-04-22 PROCEDURE — 84443 ASSAY THYROID STIM HORMONE: CPT | Performed by: INTERNAL MEDICINE

## 2025-04-22 PROCEDURE — 85027 COMPLETE CBC AUTOMATED: CPT | Performed by: INTERNAL MEDICINE

## 2025-04-22 PROCEDURE — 3074F SYST BP LT 130 MM HG: CPT | Performed by: INTERNAL MEDICINE

## 2025-04-22 PROCEDURE — 36415 COLL VENOUS BLD VENIPUNCTURE: CPT | Performed by: INTERNAL MEDICINE

## 2025-04-22 PROCEDURE — 84439 ASSAY OF FREE THYROXINE: CPT | Performed by: INTERNAL MEDICINE

## 2025-04-22 PROCEDURE — 99396 PREV VISIT EST AGE 40-64: CPT | Performed by: INTERNAL MEDICINE

## 2025-04-22 RX ORDER — BUPROPION HYDROCHLORIDE 300 MG/1
TABLET ORAL
Qty: 90 TABLET | Refills: 3 | Status: SHIPPED | OUTPATIENT
Start: 2025-04-22

## 2025-04-22 RX ORDER — ROSUVASTATIN CALCIUM 20 MG/1
20 TABLET, COATED ORAL DAILY
Qty: 90 TABLET | Refills: 3 | Status: SHIPPED | OUTPATIENT
Start: 2025-04-22

## 2025-04-22 RX ORDER — BUPROPION HYDROCHLORIDE 150 MG/1
TABLET ORAL
Qty: 90 TABLET | Refills: 3 | Status: CANCELLED | OUTPATIENT
Start: 2025-04-22

## 2025-04-22 RX ORDER — BUPROPION HYDROCHLORIDE 150 MG/1
TABLET ORAL
Qty: 90 TABLET | Refills: 3 | Status: SHIPPED | OUTPATIENT
Start: 2025-04-22

## 2025-04-22 ASSESSMENT — PATIENT HEALTH QUESTIONNAIRE - PHQ9
SUM OF ALL RESPONSES TO PHQ QUESTIONS 1-9: 5
SUM OF ALL RESPONSES TO PHQ QUESTIONS 1-9: 5
10. IF YOU CHECKED OFF ANY PROBLEMS, HOW DIFFICULT HAVE THESE PROBLEMS MADE IT FOR YOU TO DO YOUR WORK, TAKE CARE OF THINGS AT HOME, OR GET ALONG WITH OTHER PEOPLE: SOMEWHAT DIFFICULT

## 2025-04-22 ASSESSMENT — PAIN SCALES - GENERAL: PAINLEVEL_OUTOF10: NO PAIN (0)

## 2025-04-22 NOTE — PROGRESS NOTES
Preventive Care Visit  [facility] SURESH Keith MD, Internal Medicine  Apr 22, 2025          Alejandra Wallace is a 65 year old, presenting for the following: Overall she is doing very well but due to some joint issues not working out regularly.  Her weight is a bit of an issue and we discussed this.  She is up-to-date with gynecology and plans to see cardiology.  She does have chest tightness which she noted last year.  Had gone away but is back the last few months.  It is infrequent.  Is not necessarily exertional and no esophageal symptoms.  It comes and goes.  We discussed doing a CT angiogram but she wants to wait on that.  She will call if it worsens.    She is up-to-date with her oncologist.  She is up-to-date with gynecology.  She is up-to-date on mammogram and colon exam.    Her depression and anxiety are controlled.  She uses Xanax nightly for years and I reviewed the PDMP and it is appropriate.    She is  and has 2 kids, daughter is a freshman at the [employer].               Past Medical History:      Past Medical History:   Diagnosis Date    Abdominal pain 2008 and 2010    ct neg 2008, ovar us 2010 with fallopian cyst and fibroids    Anxiety and depression 90's    on xanax hs for years    ASCVD (arteriosclerotic cardiovascular disease) 12/2012    pos est, ct angio and cards fu, 25-49% lad    Breast cancer ([facility])     Chest tightness 12/2019    neg est echo    Chronic cystitis     Dr. Mcgrath    Chronic insomnia     on xanax for years, seen in sleep clinic 2016    Colonic polyp fu nl 2008    fu due 2013, fu done 2014 and nl, to fu 2019, fu nl 12/20    [place] (dyspnea on exertion)     est echo nl 2015, ct chest and pulm eval by Dr. Quiroga and no cause found    Ductal carcinoma in situ (DCIS) of right breast 2020    lumpectomy and xrt    Elevated TSH 11/2017    Hyperlipidaemia     on crestor for years    Leg pain 2014    stopped lipitor    Lung nodules 01/2012    seen on  coronary calcium ct, fu 1 year, fu done 12/12 and likely benign, fu done 2016 and no change, Dr. Quiroga    MVP (mitral valve prolapse) 2013    Had it surgically repaired, Robotic, done [facility] 6/13 seen by cardiology 2011, echo 2007 with mild mr and nl lv fxn, fu echo 12/11 with lv size upper limits of nl, nl ef, no wma, mild lae, mvp and mild mr present    Palpitations     Screening 01/2012    cor ct score 0             Past Surgical History:      Past Surgical History:   Procedure Laterality Date    BIOPSY BREAST      COLONOSCOPY  12/20    COLONOSCOPY WITH CO2 INSUFFLATION N/A 12/30/2020    Procedure: COLONOSCOPY, WITH CO2 INSUFFLATION;  Surgeon: Tera Luu MD;  Location: [facility] OR    HERNIA REPAIR      LUMPECTOMY BREAST      LUMPECTOMY BREAST WITH SEED LOCALIZATION Right 7/8/2020    Procedure: SEED LOCALIZED RIGHT BREAST LUMPECTOMY;  Surgeon: Elise Slater MD;  Location: [facility] OR    REPAIR VALVE MITRAL  6/13    done at [facility], robotic             Social History:     Social History     Socioeconomic History    Marital status:      Spouse name: Not on file    Number of children: 2    Years of education: Not on file    Highest education level: Not on file   Occupational History    Occupation:      Employer: [employer]    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   Tobacco Use    Smoking status: Never    Smokeless tobacco: Never   Vaping Use    Vaping status: Never Used   Substance and Sexual Activity    Alcohol use: Yes     Alcohol/week: 0.0 standard drinks of alcohol     Comment: 1-2/week     Drug use: No    Sexual activity: Yes     Partners: Male     Birth control/protection: Condom   Other Topics Concern     Service Not Asked    Blood Transfusions Not Asked    Caffeine Concern Yes     Comment: 2 cans of pop per day    Occupational Exposure Not Asked    Hobby Hazards Not Asked    Sleep Concern Not Asked    Stress Concern Not Asked    Weight Concern Not Asked    Special Diet Yes      Comment: vegetarian    Back Care Not Asked    Exercise Yes     Comment: 3x per week     Bike Helmet Not Asked    Seat Belt Not Asked    Self-Exams Not Asked    Parent/sibling w/ CABG, MI or angioplasty before 65F 55M? No   Social History Narrative    Not on file     Social Drivers of Health     Financial Resource Strain: Low Risk  (4/19/2025)    Financial Resource Strain     Within the past 12 months, have you or your family members you live with been unable to get utilities (heat, electricity) when it was really needed?: No   Food Insecurity: Low Risk  (4/19/2025)    Food Insecurity     Within the past 12 months, did you worry that your food would run out before you got money to buy more?: No     Within the past 12 months, did the food you bought just not last and you didn t have money to get more?: No   Transportation Needs: Low Risk  (4/19/2025)    Transportation Needs     Within the past 12 months, has lack of transportation kept you from medical appointments, getting your medicines, non-medical meetings or appointments, work, or from getting things that you need?: No   Physical Activity: Insufficiently Active (4/19/2025)    Exercise Vital Sign     Days of Exercise per Week: 1 day     Minutes of Exercise per Session: 20 min   Stress: No Stress Concern Present (4/19/2025)    [employer] [place] [employer] - Occupational Stress Questionnaire     Feeling of Stress : Only a little   Social Connections: Unknown (4/19/2025)    Social Connection and Isolation Panel [NHANES]     Frequency of Communication with Friends and Family: Not on file     Frequency of Social Gatherings with Friends and Family: Once a week     Attends Caodaism Services: Not on file     Active Member of Clubs or Organizations: Not on file     Attends Club or Organization Meetings: Not on file     Marital Status: Not on file   Interpersonal Safety: Low Risk  (4/22/2025)    Interpersonal Safety     Do you feel physically and emotionally  "safe where you currently live?: Yes     Within the past 12 months, have you been hit, slapped, kicked or otherwise physically hurt by someone?: No     Within the past 12 months, have you been humiliated or emotionally abused in other ways by your partner or ex-partner?: No   Housing Stability: Low Risk  (4/19/2025)    Housing Stability     Do you have housing? : Yes     Are you worried about losing your housing?: No             Family History:   reviewed         Allergies:     Allergies   Allergen Reactions    Albumin (Human) Difficulty breathing    Atorvastatin Muscle Pain (Myalgia)    Erythromycin      GI upset, intolerance             Medications:     Current Outpatient Medications   Medication Sig Dispense Refill    ALPRAZolam (XANAX) 1 MG tablet TAKE 1 TABLET BY MOUTH EVERY DAY AT BEDTIME AS NEEDED FOR ANXIETY 90 tablet 0    amoxicillin (AMOXIL) 500 MG tablet Take 2,000 mg by mouth daily as needed (prior to dental appointments)      aspirin 81 MG tablet Take 1 tablet by mouth daily. 1 tablet 3    buPROPion (WELLBUTRIN XL) 150 MG 24 hr tablet TAKE 1 TABLET BY MOUTH DAILY ALONG WITH 300MG TABLET FOR TOTAL DAILY DOSE OF 450MG 90 tablet 3    buPROPion (WELLBUTRIN XL) 300 MG 24 hr tablet TAKE 1 TABLET BY MOUTH DAILY WITH 150MG TABLET 90 tablet 3    Ibuprofen-Diphenhydramine Cit (IBUPROFEN PM PO) Take 2 tablets by mouth every evening       melatonin 5 MG CAPS Take 6-7.5 mg by mouth At Bedtime       Multiple Vitamin (DAILY MULTIVITAMIN PO) Take 1 tablet by mouth daily.      rosuvastatin (CRESTOR) 20 MG tablet Take 1 tablet (20 mg) by mouth daily. 90 tablet 3               Review of Systems:     The 10 point Review of Systems is negative other than noted in the HPI           Physical Exam:   Blood pressure 123/70, pulse 72, temperature 97.1  F (36.2  C), temperature source Temporal, resp. rate 16, height 1.734 m (5' 8.27\"), weight 78 kg (172 lb), SpO2 98%, not currently breastfeeding.    Exam:  Constitutional: healthy " appearing, alert and in no distress  Heent: Normocephalic. Head without obvious masses or lesions. PERRLDC, EOMI. Mouth exam within normal limits: tongue, mucous membranes, posterior pharynx all normal, no lesions or abnormalities seen.  Tm's and canals within normal limits bilaterally. Neck supple, no nuchal rigidity or masses. No supraclavicular, or cervical adenopathy. Thyroid symmetric, no masses.  Cardiovascular: Regular rate and rhythm, no murmer, rub or gallops.  JVP not elevated, no edema.  Carotids within normal limits bilaterally, no bruits.  Respiratory: Normal respiratory effort.  Lungs clear, normal flow, no wheezing or crackles.  Gastrointestinal: Normal active bowel sounds.   Soft, not tender, no masses, guarding or rebound.  No hepatosplenomegaly.   Musculoskeletal: extremities normal, no gross deformities noted.  Skin: no suspicious lesions or rashes   Neurologic: Mental status within normal limits.  Speech fluent.  No gross motor abnormalities and gait intact.  Psychiatric: mentation appears normal and affect normal.         Data:   Labs sent        Assessment:   Normal complete physical exam  Chest tightness, doubt cardiovascular, not unstable, call if worsens  Anxiety, controlled  Depression, controlled  Insomnia, controlled, no signs of med abuse  Breast cancer, no evidence of disease, follow-up with oncology  ASCVD with elevated cholesterol, doing well, on statin therapy  Colon polyp, up-to-date on follow-up  Healthcare maintenance           Plan:   Vaccines at pharmacy  Follow-up specialist  Call if chest tightness changes  Exercise, diet and weight loss  Letter with labs      Tera Keith M.D.

## 2025-04-23 NOTE — RESULT ENCOUNTER NOTE
It was a pleasure seeing you for your physical examination.  I wanted to get back to you with your test results. You should be able to view them.    I am happy to report that your cbc or complete blood count is normal with no signs of anemia, leukemia or platelet abnormalities. Your chemistry panel shows no signs of diabetes.  Your blood salts, kidney tests, liver tests, and proteins are all fine.    Your thyroid test remains slightly off but not at all concerning.  There is nothing to do about this other than the check it next year.    Your total cholesterol is 153 with the normal range being below 200.  Your HDL or good cholesterol is 53 with the normal range being above 50.  Your LDL or bad cholesterol is 80 with the normal range being below 130.  These numbers are very good.    I am happy to bring in this overall excellent report.  Please let me know if you have questions.

## 2025-06-18 DIAGNOSIS — F41.9 ANXIETY: ICD-10-CM

## 2025-06-18 RX ORDER — ALPRAZOLAM 1 MG/1
TABLET ORAL
Qty: 90 TABLET | Refills: 0 | Status: SHIPPED | OUTPATIENT
Start: 2025-06-18

## 2025-06-19 ENCOUNTER — TELEPHONE (OUTPATIENT)
Dept: CARDIOLOGY | Facility: CLINIC | Age: 65
End: 2025-06-19
Payer: COMMERCIAL

## 2025-06-19 DIAGNOSIS — Z98.890 H/O MITRAL VALVE REPAIR: Primary | ICD-10-CM

## 2025-06-19 NOTE — TELEPHONE ENCOUNTER
[facility] Call Center    Phone Message    May a detailed message be left on voicemail: yes     Reason for Call: Other: Pt called in last saw Dr Roberts in 2017 per visit summary pt is to follow up in 3-5 years with echocardiogram. Please review and request orders for echo if needed before visit with Dr Robrets. Also pt is wondering even though she is considered a new pt after 3 years she is wondering if she should still with Dr Roberts or see a different cardiologist and would like some guidance from the care team. Please review and follow up with pt to further discuss at [phone] she would prefer Rad[facility] but is okay with Lauren deluna      Action Taken: Other: Cardiology     Travel Screening: Not Applicable     Thank you!  [facility]

## 2025-06-23 NOTE — TELEPHONE ENCOUNTER
Last visit with Dr. 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  May go up to 6.25 mg or 9.375 mg.  Echocardiogram appears stable.        Mild coronary artery disease seen on angiogram 2013  Suboptimal LDL control  Change simvastatin 40 mg to rosuvastatin 20 mg and let us know if she gets myalgias.     She may refill her medications with her primary physician.  Follow-up in around 3-5 years with echocardiogram, sooner if symptoms arise.    Order placed for echocardiogram.  Called back to pt, advised order for echocardiogram is in chart. Advised OK to schedule with Dr. Roberts or any available cardiologist. Pt will call to schedule.

## 2025-06-23 NOTE — TELEPHONE ENCOUNTER
[facility]    Phone Message    May a detailed message be left on voicemail: yes     Reason for Call: Other: Patient is scheduled for a new patient appt past the 30 days. Patient would like to be on wait list for Dr. Roberts. Please review.      Action Taken: Other: cardiology     Travel Screening: Not Applicable    Thank you!  [facility]       Date of Service:

## 2025-07-29 ENCOUNTER — ANCILLARY PROCEDURE (OUTPATIENT)
Dept: MAMMOGRAPHY | Facility: CLINIC | Age: 65
End: 2025-07-29
Attending: NURSE PRACTITIONER
Payer: COMMERCIAL

## 2025-07-29 DIAGNOSIS — Z12.31 VISIT FOR SCREENING MAMMOGRAM: ICD-10-CM

## 2025-07-29 PROCEDURE — 77063 BREAST TOMOSYNTHESIS BI: CPT | Performed by: RADIOLOGY

## 2025-07-29 PROCEDURE — 77067 SCR MAMMO BI INCL CAD: CPT | Performed by: RADIOLOGY

## 2025-08-04 ENCOUNTER — ONCOLOGY VISIT (OUTPATIENT)
Dept: ONCOLOGY | Facility: CLINIC | Age: 65
End: 2025-08-04
Attending: PHYSICIAN ASSISTANT
Payer: COMMERCIAL

## 2025-08-04 VITALS
HEART RATE: 79 BPM | SYSTOLIC BLOOD PRESSURE: 112 MMHG | OXYGEN SATURATION: 99 % | RESPIRATION RATE: 16 BRPM | HEIGHT: 68 IN | WEIGHT: 174 LBS | DIASTOLIC BLOOD PRESSURE: 74 MMHG | BODY MASS INDEX: 26.37 KG/M2

## 2025-08-04 DIAGNOSIS — D05.11 DUCTAL CARCINOMA IN SITU (DCIS) OF RIGHT BREAST: Primary | ICD-10-CM

## 2025-08-04 PROCEDURE — 99213 OFFICE O/P EST LOW 20 MIN: CPT | Performed by: PHYSICIAN ASSISTANT

## 2025-08-04 RX ORDER — ESTRADIOL 10 UG/1
TABLET, FILM COATED VAGINAL
COMMUNITY
Start: 2025-07-18

## 2025-08-04 ASSESSMENT — PAIN SCALES - GENERAL: PAINLEVEL_OUTOF10: NO PAIN (0)

## (undated) DEVICE — SOL WATER IRRIG 1000ML BOTTLE 07139-09

## (undated) DEVICE — SOL WATER IRRIG 1000ML BOTTLE 2F7114

## (undated) DEVICE — NDL 25GA 1.5" 305127

## (undated) DEVICE — SU VICRYL 3-0 SH 27" J316H

## (undated) DEVICE — DRAPE BREAST/CHEST 29420

## (undated) DEVICE — NDL 19GA 1.5"

## (undated) DEVICE — PACK MINOR SBA15MIFSE

## (undated) DEVICE — PREP CHLORAPREP 26ML TINTED ORANGE  260815

## (undated) DEVICE — ESU PENCIL W/SMOKE EVAC CVPLP2000

## (undated) DEVICE — SUCTION CANISTER MEDIVAC LINER 3000ML W/LID 65651-530

## (undated) DEVICE — GLOVE PROTEXIS MICRO 6.0  2D73PM60

## (undated) DEVICE — PAD CHUX UNDERPAD 23X24" 7136

## (undated) DEVICE — DECANTER VIAL 2006S

## (undated) DEVICE — SYR BULB IRRIG 50ML LATEX FREE 0035280

## (undated) DEVICE — CLIP ETHICON LIGACLIP SM BLUE LT100

## (undated) DEVICE — GLOVE PROTEXIS BLUE W/NEU-THERA 6.5  2D73EB65

## (undated) DEVICE — ESU GROUND PAD UNIVERSAL W/O CORD

## (undated) DEVICE — SU MONOCRYL 4-0 PS-2 18" UND Y496G

## (undated) DEVICE — LINEN TOWEL PACK X5 5464

## (undated) DEVICE — SYR 10ML FINGER CONTROL W/O NDL 309695

## (undated) DEVICE — ESU ELEC BLADE 2.75" COATED/INSULATED E1455

## (undated) DEVICE — DRSG STERI STRIP 1/4X3" R1541

## (undated) DEVICE — SLEEVE PROTECTIVE BREAST BIOPSY  GMSLV001-10

## (undated) RX ORDER — FENTANYL CITRATE 50 UG/ML
INJECTION, SOLUTION INTRAMUSCULAR; INTRAVENOUS
Status: DISPENSED
Start: 2020-12-30

## (undated) RX ORDER — FENTANYL CITRATE 50 UG/ML
INJECTION, SOLUTION INTRAMUSCULAR; INTRAVENOUS
Status: DISPENSED
Start: 2020-07-08

## (undated) RX ORDER — LIDOCAINE HYDROCHLORIDE 20 MG/ML
INJECTION, SOLUTION EPIDURAL; INFILTRATION; INTRACAUDAL; PERINEURAL
Status: DISPENSED
Start: 2020-07-08

## (undated) RX ORDER — DEXAMETHASONE SODIUM PHOSPHATE 4 MG/ML
INJECTION, SOLUTION INTRA-ARTICULAR; INTRALESIONAL; INTRAMUSCULAR; INTRAVENOUS; SOFT TISSUE
Status: DISPENSED
Start: 2020-07-08

## (undated) RX ORDER — ONDANSETRON 2 MG/ML
INJECTION INTRAMUSCULAR; INTRAVENOUS
Status: DISPENSED
Start: 2020-07-08

## (undated) RX ORDER — PROPOFOL 10 MG/ML
INJECTION, EMULSION INTRAVENOUS
Status: DISPENSED
Start: 2020-07-08

## (undated) RX ORDER — FENTANYL CITRATE 0.05 MG/ML
INJECTION, SOLUTION INTRAMUSCULAR; INTRAVENOUS
Status: DISPENSED
Start: 2020-07-08

## (undated) RX ORDER — CEFAZOLIN SODIUM 2 G/100ML
INJECTION, SOLUTION INTRAVENOUS
Status: DISPENSED
Start: 2020-07-08

## (undated) RX ORDER — HYDROCODONE BITARTRATE AND ACETAMINOPHEN 5; 325 MG/1; MG/1
TABLET ORAL
Status: DISPENSED
Start: 2020-07-08